# Patient Record
Sex: MALE | Race: WHITE | Employment: OTHER | URBAN - METROPOLITAN AREA
[De-identification: names, ages, dates, MRNs, and addresses within clinical notes are randomized per-mention and may not be internally consistent; named-entity substitution may affect disease eponyms.]

---

## 2020-11-21 ENCOUNTER — HOSPITAL ENCOUNTER (INPATIENT)
Age: 70
LOS: 7 days | Discharge: NURSING, REHAB OR LONG-TERM CARE | DRG: 193 | End: 2020-11-29
Attending: EMERGENCY MEDICINE | Admitting: INTERNAL MEDICINE
Payer: MEDICARE

## 2020-11-21 ENCOUNTER — APPOINTMENT (OUTPATIENT)
Dept: GENERAL RADIOLOGY | Age: 70
DRG: 193 | End: 2020-11-21
Payer: MEDICARE

## 2020-11-21 ENCOUNTER — APPOINTMENT (OUTPATIENT)
Dept: CT IMAGING | Age: 70
DRG: 193 | End: 2020-11-21
Payer: MEDICARE

## 2020-11-21 LAB
BASOPHILS ABSOLUTE: 0 K/UL (ref 0–0.2)
BASOPHILS RELATIVE PERCENT: 0.3 %
EOSINOPHILS ABSOLUTE: 0 K/UL (ref 0–0.7)
EOSINOPHILS RELATIVE PERCENT: 0.1 %
HCT VFR BLD CALC: 45.1 % (ref 42–52)
HEMOGLOBIN: 15.1 G/DL (ref 14–18)
LYMPHOCYTES ABSOLUTE: 0.5 K/UL (ref 1–4.8)
LYMPHOCYTES RELATIVE PERCENT: 3.4 %
MCH RBC QN AUTO: 30.8 PG (ref 27–31.3)
MCHC RBC AUTO-ENTMCNC: 33.4 % (ref 33–37)
MCV RBC AUTO: 92.1 FL (ref 80–100)
MONOCYTES ABSOLUTE: 1 K/UL (ref 0.2–0.8)
MONOCYTES RELATIVE PERCENT: 7.5 %
NEUTROPHILS ABSOLUTE: 11.8 K/UL (ref 1.4–6.5)
NEUTROPHILS RELATIVE PERCENT: 88.7 %
PDW BLD-RTO: 13.7 % (ref 11.5–14.5)
PLATELET # BLD: 215 K/UL (ref 130–400)
RBC # BLD: 4.9 M/UL (ref 4.7–6.1)
WBC # BLD: 13.3 K/UL (ref 4.8–10.8)

## 2020-11-21 PROCEDURE — 96376 TX/PRO/DX INJ SAME DRUG ADON: CPT

## 2020-11-21 PROCEDURE — 81001 URINALYSIS AUTO W/SCOPE: CPT

## 2020-11-21 PROCEDURE — 71046 X-RAY EXAM CHEST 2 VIEWS: CPT

## 2020-11-21 PROCEDURE — 84145 PROCALCITONIN (PCT): CPT

## 2020-11-21 PROCEDURE — 96374 THER/PROPH/DIAG INJ IV PUSH: CPT

## 2020-11-21 PROCEDURE — 36415 COLL VENOUS BLD VENIPUNCTURE: CPT

## 2020-11-21 PROCEDURE — 82550 ASSAY OF CK (CPK): CPT

## 2020-11-21 PROCEDURE — 84484 ASSAY OF TROPONIN QUANT: CPT

## 2020-11-21 PROCEDURE — 80048 BASIC METABOLIC PNL TOTAL CA: CPT

## 2020-11-21 PROCEDURE — 93005 ELECTROCARDIOGRAM TRACING: CPT | Performed by: EMERGENCY MEDICINE

## 2020-11-21 PROCEDURE — 85025 COMPLETE CBC W/AUTO DIFF WBC: CPT

## 2020-11-21 PROCEDURE — 99285 EMERGENCY DEPT VISIT HI MDM: CPT

## 2020-11-21 PROCEDURE — 70450 CT HEAD/BRAIN W/O DYE: CPT

## 2020-11-21 RX ORDER — 0.9 % SODIUM CHLORIDE 0.9 %
1000 INTRAVENOUS SOLUTION INTRAVENOUS ONCE
Status: COMPLETED | OUTPATIENT
Start: 2020-11-21 | End: 2020-11-22

## 2020-11-21 RX ORDER — SODIUM CHLORIDE 0.9 % (FLUSH) 0.9 %
3 SYRINGE (ML) INJECTION EVERY 8 HOURS
Status: DISCONTINUED | OUTPATIENT
Start: 2020-11-21 | End: 2020-11-22

## 2020-11-21 ASSESSMENT — ENCOUNTER SYMPTOMS
SHORTNESS OF BREATH: 0
BACK PAIN: 0
EYE DISCHARGE: 0
NAUSEA: 0
VOMITING: 0
SORE THROAT: 0
EYE REDNESS: 0
COUGH: 0
ABDOMINAL PAIN: 0

## 2020-11-22 ENCOUNTER — APPOINTMENT (OUTPATIENT)
Dept: ULTRASOUND IMAGING | Age: 70
DRG: 193 | End: 2020-11-22
Payer: MEDICARE

## 2020-11-22 PROBLEM — J18.9 PNEUMONIA: Status: ACTIVE | Noted: 2020-11-22

## 2020-11-22 PROBLEM — F03.90 DEMENTIA (HCC): Status: ACTIVE | Noted: 2020-11-22

## 2020-11-22 PROBLEM — R55 SYNCOPE AND COLLAPSE: Status: ACTIVE | Noted: 2020-11-22

## 2020-11-22 PROBLEM — J18.9 COMMUNITY ACQUIRED PNEUMONIA OF BOTH LOWER LOBES: Status: ACTIVE | Noted: 2020-11-22

## 2020-11-22 LAB
ALBUMIN SERPL-MCNC: 3.5 G/DL (ref 3.5–4.6)
ALP BLD-CCNC: 50 U/L (ref 35–104)
ALT SERPL-CCNC: 14 U/L (ref 0–41)
ANION GAP SERPL CALCULATED.3IONS-SCNC: 10 MEQ/L (ref 9–15)
ANION GAP SERPL CALCULATED.3IONS-SCNC: 10 MEQ/L (ref 9–15)
AST SERPL-CCNC: 11 U/L (ref 0–40)
BACTERIA: NEGATIVE /HPF
BASOPHILS ABSOLUTE: 0.1 K/UL (ref 0–0.2)
BASOPHILS RELATIVE PERCENT: 0.5 %
BILIRUB SERPL-MCNC: 0.9 MG/DL (ref 0.2–0.7)
BILIRUBIN URINE: NEGATIVE
BLOOD, URINE: NEGATIVE
BUN BLDV-MCNC: 12 MG/DL (ref 8–23)
BUN BLDV-MCNC: 9 MG/DL (ref 8–23)
CALCIUM SERPL-MCNC: 8.7 MG/DL (ref 8.5–9.9)
CALCIUM SERPL-MCNC: 9.7 MG/DL (ref 8.5–9.9)
CHLORIDE BLD-SCNC: 100 MEQ/L (ref 95–107)
CHLORIDE BLD-SCNC: 106 MEQ/L (ref 95–107)
CLARITY: CLEAR
CO2: 21 MEQ/L (ref 20–31)
CO2: 24 MEQ/L (ref 20–31)
COLOR: YELLOW
CREAT SERPL-MCNC: 0.8 MG/DL (ref 0.7–1.2)
CREAT SERPL-MCNC: 0.98 MG/DL (ref 0.7–1.2)
EOSINOPHILS ABSOLUTE: 0 K/UL (ref 0–0.7)
EOSINOPHILS RELATIVE PERCENT: 0 %
EPITHELIAL CELLS, UA: ABNORMAL /HPF (ref 0–5)
FOLATE: 14.4 NG/ML (ref 7.3–26.1)
GFR AFRICAN AMERICAN: >60
GFR AFRICAN AMERICAN: >60
GFR NON-AFRICAN AMERICAN: >60
GFR NON-AFRICAN AMERICAN: >60
GLOBULIN: 2.9 G/DL (ref 2.3–3.5)
GLUCOSE BLD-MCNC: 207 MG/DL (ref 60–115)
GLUCOSE BLD-MCNC: 252 MG/DL (ref 60–115)
GLUCOSE BLD-MCNC: 268 MG/DL (ref 60–115)
GLUCOSE BLD-MCNC: 272 MG/DL (ref 70–99)
GLUCOSE BLD-MCNC: 328 MG/DL (ref 60–115)
GLUCOSE BLD-MCNC: 407 MG/DL (ref 70–99)
GLUCOSE URINE: >=1000 MG/DL
HCT VFR BLD CALC: 43.3 % (ref 42–52)
HEMOGLOBIN: 14.6 G/DL (ref 14–18)
HYALINE CASTS: ABNORMAL /HPF (ref 0–5)
KETONES, URINE: NEGATIVE MG/DL
LACTIC ACID: 1.2 MMOL/L (ref 0.5–2.2)
LACTIC ACID: 1.5 MMOL/L (ref 0.5–2.2)
LACTIC ACID: 3.2 MMOL/L (ref 0.5–2.2)
LEUKOCYTE ESTERASE, URINE: NEGATIVE
LYMPHOCYTES ABSOLUTE: 1 K/UL (ref 1–4.8)
LYMPHOCYTES RELATIVE PERCENT: 7.9 %
MCH RBC QN AUTO: 31.3 PG (ref 27–31.3)
MCHC RBC AUTO-ENTMCNC: 33.7 % (ref 33–37)
MCV RBC AUTO: 92.9 FL (ref 80–100)
MONOCYTES ABSOLUTE: 1.4 K/UL (ref 0.2–0.8)
MONOCYTES RELATIVE PERCENT: 11.3 %
NEUTROPHILS ABSOLUTE: 10.1 K/UL (ref 1.4–6.5)
NEUTROPHILS RELATIVE PERCENT: 80.3 %
NITRITE, URINE: NEGATIVE
PDW BLD-RTO: 13.6 % (ref 11.5–14.5)
PERFORMED ON: ABNORMAL
PH UA: 5 (ref 5–9)
PLATELET # BLD: 206 K/UL (ref 130–400)
POTASSIUM REFLEX MAGNESIUM: 4 MEQ/L (ref 3.4–4.9)
POTASSIUM SERPL-SCNC: 4.5 MEQ/L (ref 3.4–4.9)
PROCALCITONIN: 0.21 NG/ML (ref 0–0.15)
PROTEIN UA: 100 MG/DL
RBC # BLD: 4.66 M/UL (ref 4.7–6.1)
RBC UA: ABNORMAL /HPF (ref 0–5)
SARS-COV-2, NAAT: NOT DETECTED
SODIUM BLD-SCNC: 134 MEQ/L (ref 135–144)
SODIUM BLD-SCNC: 137 MEQ/L (ref 135–144)
SPECIFIC GRAVITY UA: 1.02 (ref 1–1.03)
TOTAL CK: 90 U/L (ref 0–190)
TOTAL CK: 92 U/L (ref 0–190)
TOTAL PROTEIN: 6.4 G/DL (ref 6.3–8)
TROPONIN: <0.01 NG/ML (ref 0–0.01)
TSH SERPL DL<=0.05 MIU/L-ACNC: 1.06 UIU/ML (ref 0.44–3.86)
UROBILINOGEN, URINE: 0.2 E.U./DL
VITAMIN B-12: 354 PG/ML (ref 232–1245)
WBC # BLD: 12.5 K/UL (ref 4.8–10.8)
WBC UA: ABNORMAL /HPF (ref 0–5)

## 2020-11-22 PROCEDURE — 82746 ASSAY OF FOLIC ACID SERUM: CPT

## 2020-11-22 PROCEDURE — 2580000003 HC RX 258: Performed by: EMERGENCY MEDICINE

## 2020-11-22 PROCEDURE — 94664 DEMO&/EVAL PT USE INHALER: CPT

## 2020-11-22 PROCEDURE — 84443 ASSAY THYROID STIM HORMONE: CPT

## 2020-11-22 PROCEDURE — 6360000002 HC RX W HCPCS: Performed by: NURSE PRACTITIONER

## 2020-11-22 PROCEDURE — 87040 BLOOD CULTURE FOR BACTERIA: CPT

## 2020-11-22 PROCEDURE — 6370000000 HC RX 637 (ALT 250 FOR IP): Performed by: EMERGENCY MEDICINE

## 2020-11-22 PROCEDURE — 82607 VITAMIN B-12: CPT

## 2020-11-22 PROCEDURE — U0002 COVID-19 LAB TEST NON-CDC: HCPCS

## 2020-11-22 PROCEDURE — 83605 ASSAY OF LACTIC ACID: CPT

## 2020-11-22 PROCEDURE — 1210000000 HC MED SURG R&B

## 2020-11-22 PROCEDURE — 6360000002 HC RX W HCPCS: Performed by: EMERGENCY MEDICINE

## 2020-11-22 PROCEDURE — 36415 COLL VENOUS BLD VENIPUNCTURE: CPT

## 2020-11-22 PROCEDURE — 93880 EXTRACRANIAL BILAT STUDY: CPT

## 2020-11-22 PROCEDURE — 80053 COMPREHEN METABOLIC PANEL: CPT

## 2020-11-22 PROCEDURE — 82550 ASSAY OF CK (CPK): CPT

## 2020-11-22 PROCEDURE — 2580000003 HC RX 258: Performed by: NURSE PRACTITIONER

## 2020-11-22 PROCEDURE — 85025 COMPLETE CBC W/AUTO DIFF WBC: CPT

## 2020-11-22 PROCEDURE — 6370000000 HC RX 637 (ALT 250 FOR IP): Performed by: INTERNAL MEDICINE

## 2020-11-22 PROCEDURE — 99222 1ST HOSP IP/OBS MODERATE 55: CPT | Performed by: PSYCHIATRY & NEUROLOGY

## 2020-11-22 RX ORDER — SODIUM CHLORIDE 9 MG/ML
INJECTION, SOLUTION INTRAVENOUS CONTINUOUS
Status: DISPENSED | OUTPATIENT
Start: 2020-11-22 | End: 2020-11-22

## 2020-11-22 RX ORDER — LORAZEPAM 2 MG/ML
1 INJECTION INTRAMUSCULAR
Status: ACTIVE | OUTPATIENT
Start: 2020-11-22 | End: 2020-11-22

## 2020-11-22 RX ORDER — PROMETHAZINE HYDROCHLORIDE 12.5 MG/1
12.5 TABLET ORAL EVERY 6 HOURS PRN
Status: DISCONTINUED | OUTPATIENT
Start: 2020-11-22 | End: 2020-11-29 | Stop reason: HOSPADM

## 2020-11-22 RX ORDER — ONDANSETRON 2 MG/ML
4 INJECTION INTRAMUSCULAR; INTRAVENOUS EVERY 6 HOURS PRN
Status: DISCONTINUED | OUTPATIENT
Start: 2020-11-22 | End: 2020-11-29 | Stop reason: HOSPADM

## 2020-11-22 RX ORDER — SODIUM CHLORIDE 0.9 % (FLUSH) 0.9 %
10 SYRINGE (ML) INJECTION PRN
Status: DISCONTINUED | OUTPATIENT
Start: 2020-11-22 | End: 2020-11-29 | Stop reason: HOSPADM

## 2020-11-22 RX ORDER — POLYETHYLENE GLYCOL 3350 17 G/17G
17 POWDER, FOR SOLUTION ORAL DAILY PRN
Status: DISCONTINUED | OUTPATIENT
Start: 2020-11-22 | End: 2020-11-29 | Stop reason: HOSPADM

## 2020-11-22 RX ORDER — ACETAMINOPHEN 325 MG/1
650 TABLET ORAL EVERY 6 HOURS PRN
Status: DISCONTINUED | OUTPATIENT
Start: 2020-11-22 | End: 2020-11-29 | Stop reason: HOSPADM

## 2020-11-22 RX ORDER — IPRATROPIUM BROMIDE AND ALBUTEROL SULFATE 2.5; .5 MG/3ML; MG/3ML
1 SOLUTION RESPIRATORY (INHALATION) EVERY 4 HOURS PRN
Status: DISCONTINUED | OUTPATIENT
Start: 2020-11-22 | End: 2020-11-29 | Stop reason: HOSPADM

## 2020-11-22 RX ORDER — ACETAMINOPHEN 650 MG/1
650 SUPPOSITORY RECTAL EVERY 6 HOURS PRN
Status: DISCONTINUED | OUTPATIENT
Start: 2020-11-22 | End: 2020-11-29 | Stop reason: HOSPADM

## 2020-11-22 RX ORDER — SODIUM CHLORIDE 0.9 % (FLUSH) 0.9 %
10 SYRINGE (ML) INJECTION EVERY 12 HOURS SCHEDULED
Status: DISCONTINUED | OUTPATIENT
Start: 2020-11-22 | End: 2020-11-29 | Stop reason: HOSPADM

## 2020-11-22 RX ADMIN — INSULIN HUMAN 2 UNITS: 100 INJECTION, SOLUTION PARENTERAL at 02:35

## 2020-11-22 RX ADMIN — CEFTRIAXONE SODIUM 1 G: 1 INJECTION, POWDER, FOR SOLUTION INTRAMUSCULAR; INTRAVENOUS at 04:03

## 2020-11-22 RX ADMIN — INSULIN HUMAN 2 UNITS: 100 INJECTION, SOLUTION PARENTERAL at 00:44

## 2020-11-22 RX ADMIN — SODIUM CHLORIDE: 9 INJECTION, SOLUTION INTRAVENOUS at 06:15

## 2020-11-22 RX ADMIN — SODIUM CHLORIDE 1000 ML: 9 INJECTION, SOLUTION INTRAVENOUS at 00:42

## 2020-11-22 RX ADMIN — ENOXAPARIN SODIUM 40 MG: 40 INJECTION SUBCUTANEOUS at 09:15

## 2020-11-22 RX ADMIN — Medication 10 ML: at 22:58

## 2020-11-22 RX ADMIN — Medication 3 ML: at 00:42

## 2020-11-22 RX ADMIN — Medication 10 ML: at 09:15

## 2020-11-22 RX ADMIN — AZITHROMYCIN 500 MG: 500 INJECTION, POWDER, LYOPHILIZED, FOR SOLUTION INTRAVENOUS at 06:16

## 2020-11-22 RX ADMIN — INSULIN LISPRO 2 UNITS: 100 INJECTION, SOLUTION INTRAVENOUS; SUBCUTANEOUS at 17:37

## 2020-11-22 ASSESSMENT — ENCOUNTER SYMPTOMS
TROUBLE SWALLOWING: 0
VOMITING: 0
COUGH: 1
SHORTNESS OF BREATH: 0
CHOKING: 0
COLOR CHANGE: 0
PHOTOPHOBIA: 0
BACK PAIN: 0
ABDOMINAL PAIN: 0
NAUSEA: 0

## 2020-11-22 NOTE — PROGRESS NOTES
Pt admitted after midnight with encephalopathy with syncope/fall 2/2 pneumonia. Continuing Rocephin/Azithro. Neuro consulted and wants MRI. Repeat COVID tonight at [place] [address]. Can discontinue precautions after second negative test. PT/OT. Pt continues to be very confused.     Tammy Washington, DO  Internal Medicine

## 2020-11-22 NOTE — H&P
[place] [address] MEDICINE    HISTORY AND PHYSICAL EXAM    PATIENT NAME:  Juancarlos Mclaughlin    MRN:  [id number]  SERVICE DATE:  11/22/2020   SERVICE TIME:  4:04 AM    Primary Care Physician: JERRICA Gonzalez CNP         SUBJECTIVE  CHIEF COMPLAINT: Syncope and collapse    HPI: Patient is an alert and oriented x1 patient. Pleasant and cooperative. Baseline is unknown. Patient presented to the ED after wife reported that they had gone to dinner and was walking to the house and he fell to his knees and collapsed. It is unclear whether patient had any seizure activity or was unresponsive. Patient currently denies any pain. Patient can state his name and birthdate. However patient does not know the month or the year. When asked what the patient was doing today he said earlier he went to the hospital with his wife and now is home in bed. He denies going out to dinner with his wife. According to the ER staff patient had slurred speech on arrival with a left lower extremity weakness. Left lower extremity weakness has since resolved and patient has no slurred speech. Patient's baseline is unknown. However, according to EMR patient has a history of dementia and bipolar. Patient denies any pain, nausea, vomiting, or diarrhea. Patient does state he has a cough and he has been coughing all day. Patient was incontinent in the ER twice. No seizure activity observed in the ED. According to the EMR other past medical history includes CVA, diabetes, hypertension, hyperlipidemia, and cancer of skin in remission. According to wife report given to the ER staff was that the patient does not drink or smoke.     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    Diabetes ([address]     DM (diabetes mellitus) ([address]     Hyperlipidemia     Hypertension     Stroke ([address]      PAST SURGICAL HISTORY:    Past Surgical History: Procedure Laterality Date    AV FISTULA REPAIR      COLONOSCOPY  10/21/15    w/polypectomy     HERNIA REPAIR      TONSILLECTOMY      TRACHEOTOMY       FAMILY HISTORY:    Family History   Problem Relation Age of Onset    Diabetes Mother     Heart Disease Mother     Stroke Father     Heart Disease Father     Cancer Brother     Stroke Sister      SOCIAL HISTORY:    Social History     Socioeconomic History    Marital status:      Spouse name: Not on file    Number of children: Not on file    Years of education: Not on file    Highest education level: Not on file   Occupational History    Not on file   Social Needs    Financial resource strain: Not on file    Food insecurity     Worry: Not on file     Inability: Not on file    Transportation needs     Medical: Not on file     Non-medical: Not on file   Tobacco Use    Smoking status: Former Smoker    Smokeless tobacco: Never Used   Substance and Sexual Activity    Alcohol use: No     Alcohol/week: 0.0 standard drinks    Drug use: No    Sexual activity: Not on file   Lifestyle    Physical activity     Days per week: Not on file     Minutes per session: Not on file    Stress: Not on file   Relationships    Social connections     Talks on phone: Not on file     Gets together: Not on file     Attends Advent service: Not on file     Active member of club or organization: Not on file     Attends meetings of clubs or organizations: Not on file     Relationship status: Not on file    Intimate partner violence     Fear of current or ex partner: Not on file     Emotionally abused: Not on file     Physically abused: Not on file     Forced sexual activity: Not on file   Other Topics Concern    Not on file   Social History Narrative    Not on file     MEDICATIONS:   Prior to Admission medications    Medication Sig Start Date End Date Taking?  Authorizing Provider   glipiZIDE (GLUCOTROL) 5 MG ER tablet Take by mouth 2/14/08 Historical Provider, MD   lamoTRIgine (LAMICTAL) 100 MG tablet Take by mouth 2/14/08   Historical Provider, MD   ziprasidone (GEODON) 20 MG capsule Take by mouth 2/14/08   Historical Provider, MD   metFORMIN (GLUCOPHAGE) 500 MG tablet Take 500 mg by mouth 4 times daily    Historical Provider, MD   SIMVASTATIN PO Take by mouth    Historical Provider, MD   lisinopril (PRINIVIL;ZESTRIL) 10 MG tablet Take 10 mg by mouth daily    Historical Provider, MD   aspirin 81 MG tablet Take 81 mg by mouth daily    Historical Provider, MD       ALLERGIES: Pioglitazone    REVIEW OF SYSTEM:   Review of Systems   Unable to perform ROS: Dementia (ROS limited due to dementia and mental status.)   Respiratory: Positive for cough. Cardiovascular: Negative for chest pain. Gastrointestinal: Negative for abdominal pain. Musculoskeletal: Negative for neck pain. Neurological: Negative for headaches. OBJECTIVE  PHYSICAL EXAM: /82   Pulse 97   Temp 98.6 °F (37 °C) (Oral)   Resp 21   Ht 6' (1.829 m)   Wt 212 lb (96.2 kg)   SpO2 100%   BMI 28.75 kg/m²     Physical Exam  Vitals signs and nursing note reviewed. Constitutional:       Appearance: He is well-developed. HENT:      Right Ear: External ear normal.      Left Ear: External ear normal.      Nose: Nose normal.   Eyes:      Pupils: Pupils are equal, round, and reactive to light. Neck:      Musculoskeletal: Normal range of motion. Cardiovascular:      Rate and Rhythm: Normal rate and regular rhythm. Heart sounds: Normal heart sounds. Pulmonary:      Effort: Pulmonary effort is normal. No respiratory distress. Breath sounds: Normal breath sounds. No wheezing. Abdominal:      General: Bowel sounds are normal.      Palpations: Abdomen is soft. There is no mass. Tenderness: There is no abdominal tenderness. Musculoskeletal: Normal range of motion. General: No deformity or signs of injury. Right lower leg: No edema.       Left lower leg: No edema. Lymphadenopathy:      Cervical: No cervical adenopathy. Skin:     General: Skin is warm and dry. Capillary Refill: Capillary refill takes less than 2 seconds. Neurological:      Mental Status: He is alert. Sensory: Sensation is intact. Motor: No tremor, seizure activity or pronator drift. Deep Tendon Reflexes: Reflexes normal.   Psychiatric:         Attention and Perception: Attention normal.         Speech: Speech normal. Speech is not slurred. Behavior: Behavior is cooperative. Thought Content: Thought content normal.         Cognition and Memory: Cognition is impaired. Memory is impaired. He exhibits impaired recent memory. DATA:     Diagnostic tests reviewed for today's visit:    Most recent labs and imaging results reviewed.      LABS:    Recent Results (from the past 24 hour(s))   Basic Metabolic Panel    Collection Time: 11/21/20 11:15 PM   Result Value Ref Range    Sodium 134 (L) 135 - 144 mEq/L    Potassium 4.5 3.4 - 4.9 mEq/L    Chloride 100 95 - 107 mEq/L    CO2 24 20 - 31 mEq/L    Anion Gap 10 9 - 15 mEq/L    Glucose 407 (HH) 70 - 99 mg/dL    BUN 12 8 - 23 mg/dL    CREATININE 0.98 0.70 - 1.20 mg/dL    GFR Non-African American >60.0 >60    GFR  >60.0 >60    Calcium 9.7 8.5 - 9.9 mg/dL   CBC Auto Differential    Collection Time: 11/21/20 11:15 PM   Result Value Ref Range    WBC 13.3 (H) 4.8 - 10.8 K/uL    RBC 4.90 4.70 - 6.10 M/uL    Hemoglobin 15.1 14.0 - 18.0 g/dL    Hematocrit 45.1 42.0 - 52.0 %    MCV 92.1 80.0 - 100.0 fL    MCH 30.8 27.0 - 31.3 pg    MCHC 33.4 33.0 - 37.0 %    RDW 13.7 11.5 - 14.5 %    Platelets 659 010 - 176 K/uL    Neutrophils % 88.7 %    Lymphocytes % 3.4 %    Monocytes % 7.5 %    Eosinophils % 0.1 %    Basophils % 0.3 %    Neutrophils Absolute 11.8 (H) 1.4 - 6.5 K/uL    Lymphocytes Absolute 0.5 (L) 1.0 - 4.8 K/uL    Monocytes Absolute 1.0 (H) 0.2 - 0.8 K/uL    Eosinophils Absolute 0.0 0.0 - 0.7 K/uL    Basophils Absolute 0.0 0.0 - 0.2 K/uL   Urinalysis    Collection Time: 11/21/20 11:15 PM   Result Value Ref Range    Color, UA Yellow Straw/Yellow    Clarity, UA Clear Clear    Glucose, Ur >=1000 (A) Negative mg/dL    Bilirubin Urine Negative Negative    Ketones, Urine Negative Negative mg/dL    Specific Gravity, UA 1.024 1.005 - 1.030    Blood, Urine Negative Negative    pH, UA 5.0 5.0 - 9.0    Protein,  (A) Negative mg/dL    Urobilinogen, Urine 0.2 <2.0 E.U./dL    Nitrite, Urine Negative Negative    Leukocyte Esterase, Urine Negative Negative   Troponin    Collection Time: 11/21/20 11:15 PM   Result Value Ref Range    Troponin <0.010 0.000 - 0.010 ng/mL   CK    Collection Time: 11/21/20 11:15 PM   Result Value Ref Range    Total CK 92 0 - 190 U/L   Microscopic Urinalysis    Collection Time: 11/21/20 11:15 PM   Result Value Ref Range    Bacteria, UA Negative Negative /HPF    Hyaline Casts, UA 0-1 0 - 5 /HPF    WBC, UA 0-2 0 - 5 /HPF    RBC, UA 3-5 (A) 0 - 5 /HPF    Epithelial Cells, UA 0-2 0 - 5 /HPF   EKG 12 Lead - Chest Pain    Collection Time: 11/21/20 11:29 PM   Result Value Ref Range    Ventricular Rate 93 BPM    Atrial Rate 93 BPM    P-R Interval 138 ms    QRS Duration 98 ms    Q-T Interval 346 ms    QTc Calculation (Bazett) 430 ms    P Axis 36 degrees    R Axis 14 degrees    T Axis 58 degrees   Lactic Acid, Plasma    Collection Time: 11/22/20  1:00 AM   Result Value Ref Range    Lactic Acid 3.2 (HH) 0.5 - 2.2 mmol/L   POCT Glucose    Collection Time: 11/22/20  1:34 AM   Result Value Ref Range    POC Glucose 328 (H) 60 - 115 mg/dl    Performed on ACCU-CHEK    COVID-19    Collection Time: 11/22/20  2:54 AM   Result Value Ref Range    SARS-CoV-2, NAAT Not Detected Not Detected   POCT Glucose    Collection Time: 11/22/20  3:44 AM   Result Value Ref Range    POC Glucose 252 (H) 60 - 115 mg/dl    Performed on ACCU-CHEK        IMAGING:  No results found.     VTE Prophylaxis: low molecular weight heparin -  start    ASSESSMENT AND PLAN    Principal Problem:  1) Pneumonia: Chest x-ray: left lower lobe pneumonia. Lactic acid 3.2. Patient received 1L normal saline in ED. Patient received Zithromax IV and Rocephin IV in ED. We will send for procalcitonin. We will continue Zithromax and Rocephin. We will monitor respiratory status closely. We will continue gentle IVF. We will recheck lactic acid. 2) Syncope and collapse: While with wife today patient collapsed to his knees. Unclear if he lost responsiveness or if he had seizure activity. Lactic acid was elevated 3.2. Patient appears to be confused at this time. Baseline unknown. Patient has a history of dementia. Head CT negative. We will consult neurology to evaluate possible cause of syncope as seizure, dementia related, or infectious in nature. Active Problems:  3) Bipolar 1 disorder: Patient on home meds to control. Patient on Lamictal.  No known records of seizure in the past.    4) Diabetes: Patient on insulin at home. Glucose 407 in ED. In ED brought to 250 with insulin. We will monitor glucose and cover with medium sliding scale insulin. 5) Dementia: EMR shows history of dementia. Patient on Alzheimer meds. We will continue home meds. We will get PT OT. We will get case management for home health needs. Plan of care discussed with: patient    SIGNATURE: Yumiko Rodriguez RN, NP  DATE: November 22, 2020  TIME: 4:04 AM     LINO Landry MD - supervising physician

## 2020-11-22 NOTE — ED TRIAGE NOTES
Per squad patient was out with his wife for dinner and when returning home patient got out of the car walking up to the house and became weak went down on his knees and then sat down and could not get up. Patient did urinate on him self during this process.

## 2020-11-22 NOTE — CARE COORDINATION
PHONED [facility]. THEY HAVE NO BEDS AVAILABLE. FAXED INFORMATION TO SECURE LINE AT 21 [phone]. SHE STATES TO FOLLOW UP WITH THE TRANSFER LINE IN A FEW DAYS.

## 2020-11-22 NOTE — CARE COORDINATION
Phoned wife Marsha Larson on cell phone. HIPAA code provided. She states they were doing [place] at the [employer] last night and he has been confused. She states he does not drink etoh. She states they have no family close by, all live out of state. She does not know the name or phone number of his daughter Benita Szymanski. She states he is usually independent. SHE STATES HE IS A [facility] PATIENT. NEED TO NOTIFY [facility]. [facility] EMERGENCY [facility] AT [place] Case Management Initial Discharge Assessment    Met with Patient to discuss discharge plan. PCP: Xochitl Mchugh APRN - CNP                                Date of Last Visit: \"I can't remember\"    If no PCP, list provided? N/A    Discharge Planning    Living Arrangements: independently at home    Who do you live with? WIFE    Who helps you with your care:  self    If lives at home:     Do you have any barriers navigating in your home? no    Patient can perform ADL? Yes    Current Services (outpatient and in home) :  None    Dialysis: No    Is transportation available to get to your appointments? No, Discussed Options PT IS THE ONLY PERSON WHO DRIVES, WILL NEED ASSISTANCE. DME Equipment:  no    Respiratory equipment: None    Respiratory provider:  no     [facility]  [facility]     Consult with Medication Assistance Program?  No      Patient agreeable to [facility]? Yes, [facility]    Patient agreeable to SNF/Rehab? Declined    Other discharge needs identified? N/A    Freedom of choice list provided with basic dialogue that supports the patient's individualized plan of care/goals and shares the quality data associated with the providers. Yes    Does Patient Have a High-Risk for Readmission Diagnosis (CHF, PN, MI, COPD)? YES, HX OF DM, STROKE    The plan for Transition of Care is related to the following treatment goals:INCREASED STRENGTH, [address] [place] [address]    Initial Discharge Plan?  (Note: please see concurrent daily documentation for any updates after initial note).     HOME W/WIFE    The Patient and/or patient representative: PATIENT was provided with choice of any post-acute providers for care and equipment and agrees with discharge plan  Yes    Electronically signed by Freddie Black RN on 11/22/2020 at 11:15 AM

## 2020-11-22 NOTE — PLAN OF CARE
Problem: Falls - Risk of:  Goal: Will remain free from falls  Description: Will remain free from falls  11/22/2020 1207 by Eamon Dodge RN  Outcome: Ongoing  11/22/2020 0625 by Neena Garcia RN  Outcome: Ongoing  Goal: Absence of physical injury  Description: Absence of physical injury  11/22/2020 1207 by Eamon Dodge RN  Outcome: Ongoing  11/22/2020 0625 by Neena Garcia RN  Outcome: Ongoing     Problem: Skin Integrity:  Goal: Will show no infection signs and symptoms  Description: Will show no infection signs and symptoms  11/22/2020 1207 by Eamon Dodge RN  Outcome: Ongoing  11/22/2020 0625 by Neena Garcia RN  Outcome: Ongoing  Goal: Absence of new skin breakdown  Description: Absence of new skin breakdown  11/22/2020 1207 by Eamon Dodge RN  Outcome: Ongoing  11/22/2020 0625 by Neena Garcia RN  Outcome: Ongoing     Problem: HEMODYNAMIC STATUS  Goal: Patient has stable vital signs and fluid balance  Outcome: Ongoing     Problem: ACTIVITY INTOLERANCE/IMPAIRED MOBILITY  Goal: Mobility/activity is maintained at optimum level for patient  Outcome: Ongoing     Problem: COMMUNICATION IMPAIRMENT  Goal: Ability to express needs and understand communication  Outcome: Ongoing

## 2020-11-22 NOTE — ED NOTES
Bed: 02  Expected date: 11/21/20  Expected time: 10:43 PM  Means of arrival: [facility]  Comments:  77 M  nissa Lechuga RN  11/21/20 7627

## 2020-11-22 NOTE — PROGRESS NOTES
[facility]      Date: 2020  Patient Name: Maryjane Davis        MRN: [id number]  Account: [de-identified]   : 1950  (65 y.o.)  Room: X250/J741-78    Pt. is of observation status. To comply with medicare and insurance regulations, to see patient we require updated orders including a valid OT treatment diagnosis. Please reorder as appropriate.      Electronically signed by Jin Garvey OT on 2020 at 8:53 AM

## 2020-11-22 NOTE — CONSULTS
Hyperlipidemia     Hypertension     Stroke [facility])      Past Surgical History:   Procedure Laterality Date    AV FISTULA REPAIR      COLONOSCOPY  10/21/15    w/polypectomy     HERNIA REPAIR      TONSILLECTOMY      TRACHEOTOMY       Social History     Socioeconomic History    Marital status:      Spouse name: Not on file    Number of children: Not on file    Years of education: Not on file    Highest education level: Not on file   Occupational History    Not on file   Social Needs    Financial resource strain: Not on file    Food insecurity     Worry: Not on file     Inability: Not on file    Transportation needs     Medical: Not on file     Non-medical: Not on file   Tobacco Use    Smoking status: Former Smoker    Smokeless tobacco: Never Used   Substance and Sexual Activity    Alcohol use: No     Alcohol/week: 0.0 standard drinks    Drug use: No    Sexual activity: Not on file   Lifestyle    Physical activity     Days per week: Not on file     Minutes per session: Not on file    Stress: Not on file   Relationships    Social connections     Talks on phone: Not on file     Gets together: Not on file     Attends Mosque service: Not on file     Active member of club or organization: Not on file     Attends meetings of clubs or organizations: Not on file     Relationship status: Not on file    Intimate partner violence     Fear of current or ex partner: Not on file     Emotionally abused: Not on file     Physically abused: Not on file     Forced sexual activity: Not on file   Other Topics Concern    Not on file   Social History Narrative    Not on file     Family History   Problem Relation Age of Onset    Diabetes Mother     Heart Disease Mother     Stroke Father     Heart Disease Father     Cancer Brother     Stroke Sister      Allergies   Allergen Reactions    Pioglitazone      Current Facility-Administered Medications   Medication Dose Route Frequency Provider Last Rate Last Dose    sodium chloride flush 0.9 % injection 10 mL  10 mL Intravenous 2 times per day Rose Nicole RN, NP   10 mL at 11/22/20 0915    sodium chloride flush 0.9 % injection 10 mL  10 mL Intravenous PRN Rose Nicole RN, JENNIFER        acetaminophen (TYLENOL) tablet 650 mg  650 mg Oral Q6H PRN Rose Nicole RN, NP        Or    acetaminophen (TYLENOL) suppository 650 mg  650 mg Rectal Q6H PRN Rose Nicole RN, JENNIFER        polyethylene glycol (GLYCOLAX) packet 17 g  17 g Oral Daily PRN Rose Nicole RN, NP        promethazine (PHENERGAN) tablet 12.5 mg  12.5 mg Oral Q6H PRN Rose Nicole RN, NP        Or    ondansetron (ZOFRAN) injection 4 mg  4 mg Intravenous Q6H PRN Rose Nicole RN, JENNIFER        enoxaparin (LOVENOX) injection 40 mg  40 mg Subcutaneous Daily Rose Nicole RN, NP   40 mg at 11/22/20 0915    0.9 % sodium chloride infusion   Intravenous Continuous Rose Nicole RN,  mL/hr at 11/22/20 0615      ipratropium-albuterol (DUONEB) nebulizer solution 1 ampule  1 ampule Inhalation Q4H PRN Rose Nicole RN, NP        [START ON 11/23/2020] azithromycin (ZITHROMAX) 500 mg in D5W 250ml addavial  500 mg Intravenous Q24H Rose Nicole RN, NP        And    [START ON 11/23/2020] cefTRIAXone (ROCEPHIN) 1 g IVPB in 50 mL D5W minibag  1 g Intravenous Q24H Rose Nicole RN, JENNIFER            Objective:   BP (!) 140/70   Pulse 100   Temp 98.6 °F (37 °C) (Oral)   Resp 18   Ht 6' (1.829 m)   Wt 212 lb (96.2 kg)   SpO2 97%   BMI 28.75 kg/m²     Physical Exam  Vitals signs reviewed. Eyes:      Pupils: Pupils are equal, round, and reactive to light. Neck:      Musculoskeletal: Normal range of motion. Cardiovascular:      Rate and Rhythm: Normal rate and regular rhythm. Heart sounds: No murmur.    Pulmonary:      Effort: Pulmonary effort is normal.      Breath sounds: Normal breath sounds. Abdominal:      General: Bowel sounds are normal.   Musculoskeletal: Normal range of motion. Skin:     General: Skin is warm. Neurological:      Mental Status: He is alert and oriented to person, place, and time. Cranial Nerves: No cranial nerve deficit. Sensory: No sensory deficit. Motor: No abnormal muscle tone. Coordination: Coordination normal.      Deep Tendon Reflexes: Reflexes are normal and symmetric. Babinski sign absent on the right side. Babinski sign absent on the left side. Psychiatric:         Mood and Affect: Mood normal.       Absent ankle reflex is notable, I await physical therapy for further evaluation  Xr Chest (2 Vw)    Result Date: 11/22/2020  XR CHEST (2 VW) : 11/21/2020 CLINICAL HISTORY:  mental status change . COMPARISON: Portable chest 8/10/2012 and two-view chest 7/19/2005. TECHNIQUE: Upright AP and lateral radiographs of the chest were obtained. FINDINGS: A poor inspiratory volume is present with mild probable bibasilar atelectasis. Bronchopneumonia should be excluded clinically. There is no cardiomegaly, significant pleural effusion, vascular congestion, pneumothorax, or displaced fractures identified. POOR INSPIRATION WITH MILD PROBABLE BIBASILAR ATELECTASIS. BRONCHOPNEUMONIA SHOULD BE EXCLUDED CLINICALLY. Ct Head Wo Contrast    Result Date: 11/22/2020  CT Brain Contrast medium:  Not utilized. History: Altered mental status Comparison:  CT brain, August 10, 2012 MRI brain, August 12, 2012 Findings: Extra-axial spaces:  Normal. Intracranial hemorrhage:  None. Ventricular system: Ventricles mildly enlarged. Sulci mildly prominent. Basal Cisterns:  Normal. Cerebral Parenchyma: Bilateral symmetric periventricular areas decreased attenuation. Midline Shift:  None. Cerebellum:  Normal. Paranasal sinuses and mastoid air cells:  Normal. Visualized Orbits:  Normal.     Impression: No acute findings. Mild cerebral atrophy.  Chronic ischemic white matter disease. All CT scans at this facility use dose modulation, iterative reconstruction, and/or weight based dosing when appropriate to reduce radiation dose to as low as reasonably achievable. Lab Results   Component Value Date    WBC 12.5 11/22/2020    RBC 4.66 11/22/2020    HGB 14.6 11/22/2020    HCT 43.3 11/22/2020    MCV 92.9 11/22/2020    MCH 31.3 11/22/2020    MCHC 33.7 11/22/2020    RDW 13.6 11/22/2020     11/22/2020    MPV 7.3 08/12/2012     Lab Results   Component Value Date     11/22/2020    K 4.0 11/22/2020     11/22/2020    CO2 21 11/22/2020    BUN 9 11/22/2020    CREATININE 0.80 11/22/2020    GFRAA >60.0 11/22/2020    LABGLOM >60.0 11/22/2020    GLUCOSE 272 11/22/2020    PROT 6.4 11/22/2020    LABALBU 3.5 11/22/2020    CALCIUM 8.7 11/22/2020    BILITOT 0.9 11/22/2020    ALKPHOS 50 11/22/2020    AST 11 11/22/2020    ALT 14 11/22/2020     Lab Results   Component Value Date    PROTIME 11.2 08/10/2012    INR 1.1 08/10/2012     Lab Results   Component Value Date    TSH 0.784 08/12/2012    [id number] 286 08/12/2012    FOLATE 16.6 08/12/2012     Lab Results   Component Value Date    TRIG 92 08/10/2012    HDL 41 08/10/2012    LDLCALC 90 08/10/2012     No results found for: Wandalee Snow, LABBENZ, CANNAB, COCAINESCRN, LABMETH, OPIATESCREENURINE, PHENCYCLIDINESCREENURINE, PPXUR, ETOH  No results found for: LITHIUM, DILFRTOT, VALPROATE    Assessment:   Presyncope or a basilar TIA this cannot be ruled out. Patient's leg weakness could be from lumbar canal stenosis. The incontinence is a red herring. Patient does have history of lumbar disc disease with lumbar canal stenosis seen at L3-L4. This was diagnosed in 2012 am not quite sure if anything has been done for it. I do not see a scar or any surgical intervention done and therefore patient require MRI of the lumbosacral spine. He is not quite aware of any other history.     We will assess his walking abilities once we have the MRI.    Yves VIDYA Escobar MD, [place], American Board of Psychiatry & Neurology  Board Certified in Vascular Neurology  Board Certified in Neuromuscular Medicine  Certified in [facility]:

## 2020-11-22 NOTE — PROGRESS NOTES
Shift assessment completed. Pt is alert but disoriented x4, nonredirectable at times, impulsive. Pt is very confused he keeps trying to get up to use the restroom even though he has a [place] catheter in place. Neuro assessment unremarkable. Lung sounds regular, unlabored, chest expansion symmetrical but diminished. Heart sounds WDL. Bowel sounds active x4 quadrants. Pt has no requests at this time, resting comfortably in bed, call light within reach.

## 2020-11-22 NOTE — ED PROVIDER NOTES
[address] [facility] ED  EMERGENCY DEPARTMENT ENCOUNTER      Pt Name: Yovanny Lechuga  MRN: [id number]  [place] 1950  Date of evaluation: 11/21/2020  Provider: Myles Stewart DO        HISTORY OF PRESENT ILLNESS    Yovanny Lechuga is a 65 y.o. male per chart review has ah/o CVA, Diabetes and HTN. Presents with syncope. The history is provided by the patient and the EMS personnel. Altered Mental Status   Presenting symptoms: unresponsiveness    Presenting symptoms: no confusion    Severity:  Moderate  Most recent episode: Today  Episode history:  Single  Duration:  1 minute  Timing:  Intermittent  Progression:  Unable to specify  Chronicity:  New  Associated symptoms: weakness    Associated symptoms: no abdominal pain, no fever, no nausea, no palpitations, no rash and no vomiting             REVIEW OF SYSTEMS       Review of Systems   Constitutional: Negative for chills and fever. HENT: Negative for ear pain and sore throat. Eyes: Negative for discharge and redness. Respiratory: Negative for cough and shortness of breath. Cardiovascular: Negative for chest pain and palpitations. Gastrointestinal: Negative for abdominal pain, nausea and vomiting. Genitourinary: Negative for difficulty urinating and dysuria. Musculoskeletal: Negative for back pain and neck pain. Skin: Negative for rash and wound. Neurological: Positive for syncope and weakness. Negative for dizziness. Psychiatric/Behavioral: Negative for confusion. The patient is not nervous/anxious. All other systems reviewed and are negative. Except as noted above the remainder of the review of systems was reviewed and negative.        PAST MEDICAL HISTORY     Past Medical History:   Diagnosis Date    Bipolar 1 disorder ([facility] [address]     Cancer of skin of left ear     cured    Diabetes ([facility] [address]     DM (diabetes mellitus) ([facility] [address]     Hyperlipidemia     Hypertension     Stroke [facility])          SURGICAL HISTORY       Past Surgical History:   Procedure Laterality Date    AV FISTULA REPAIR      COLONOSCOPY  10/21/15    w/polypectomy     HERNIA REPAIR      TONSILLECTOMY      TRACHEOTOMY           CURRENT MEDICATIONS       Previous Medications    ASPIRIN 81 MG TABLET    Take 81 mg by mouth daily    GLIPIZIDE (GLUCOTROL) 5 MG ER TABLET    Take by mouth    LAMOTRIGINE (LAMICTAL) 100 MG TABLET    Take by mouth    LISINOPRIL (PRINIVIL;ZESTRIL) 10 MG TABLET    Take 10 mg by mouth daily    METFORMIN (GLUCOPHAGE) 500 MG TABLET    Take 500 mg by mouth 4 times daily    SIMVASTATIN PO    Take by mouth    ZIPRASIDONE (GEODON) 20 MG CAPSULE    Take by mouth       ALLERGIES     Pioglitazone    FAMILY HISTORY       Family History   Problem Relation Age of Onset    Diabetes Mother     Heart Disease Mother     Stroke Father     Heart Disease Father     Cancer Brother     Stroke Sister           SOCIAL HISTORY       Social History     Socioeconomic History    Marital status:      Spouse name: None    Number of children: None    Years of education: None    Highest education level: None   Occupational History    None   Social Needs    Financial resource strain: None    Food insecurity     Worry: None     Inability: None    Transportation needs     Medical: None     Non-medical: None   Tobacco Use    Smoking status: Former Smoker    Smokeless tobacco: Never Used   Substance and Sexual Activity    Alcohol use: No     Alcohol/week: 0.0 standard drinks    Drug use: No    Sexual activity: None   Lifestyle    Physical activity     Days per week: None     Minutes per session: None    Stress: None   Relationships    Social connections     Talks on phone: None     Gets together: None     Attends [employer] service: None     Active member of club or organization: None     Attends meetings of clubs or organizations: None     Relationship status: None    Intimate partner violence     Fear of current or ex partner: None Emotionally abused: None     Physically abused: None     Forced sexual activity: None   Other Topics Concern    None   Social History Narrative    None         PHYSICAL EXAM       ED Triage Vitals   BP Temp Temp src Pulse Resp SpO2 Height Weight   -- -- -- -- -- -- -- --       Physical Exam  Vitals signs and nursing note reviewed. Constitutional:       Appearance: Normal appearance. Comments: Wet himself pants wet   HENT:      Head: Normocephalic and atraumatic. Right Ear: Tympanic membrane normal.      Left Ear: Tympanic membrane normal.      Nose: Nose normal.      Mouth/Throat:      Mouth: Mucous membranes are moist.      Pharynx: Oropharynx is clear. Eyes:      General: Lids are normal.      Extraocular Movements: Extraocular movements intact. Conjunctiva/sclera: Conjunctivae normal.      Pupils: Pupils are equal, round, and reactive to light. Neck:      Musculoskeletal: Full passive range of motion without pain, normal range of motion and neck supple. Cardiovascular:      Rate and Rhythm: Normal rate and regular rhythm. Pulses: Normal pulses. Heart sounds: Normal heart sounds. Pulmonary:      Effort: Pulmonary effort is normal.      Breath sounds: Normal breath sounds. Abdominal:      General: Abdomen is flat. Bowel sounds are normal.      Palpations: Abdomen is soft. Musculoskeletal: Normal range of motion. Skin:     General: Skin is warm. Capillary Refill: Capillary refill takes less than 2 seconds. Neurological:      General: No focal deficit present. Mental Status: He is alert and oriented to person, place, and time. GCS: GCS eye subscore is 4. GCS verbal subscore is 5. GCS motor subscore is 6. Cranial Nerves: Cranial nerves are intact. Sensory: Sensation is intact. Motor: Motor function is intact. Deep Tendon Reflexes: Reflexes are normal and symmetric.    Psychiatric:         Attention and Perception: Attention and perception TempSrc:       SpO2: 100%      Weight:       Height:           MDM  Number of Diagnoses or Management Options  Hyperglycemia:   TIA (transient ischemic attack):   Diagnosis management comments: Patient presents with a change in mental status. CT brain, CXR, EKG and labs ordered. The patient has a blood glucose of 400. Insulin 2 Units Regular ordered IV. CT brain is negative for acute changes. The patient has a WBC of 13. CXR shows possible infiltrate on the right side. EKG Interpretation    Interpreted by emergency department physician    Rhythm: normal sinus   Rate: normal  Axis: normal  Ectopy: none  Conduction: normal  ST Segments: nonspecific changes  T Waves: non specific changes  Q Waves: nonspecific    Clinical Impression: non-specific EKG    GABRIEL KEYES         CRITICAL CARE TIME   Total CriticalCare time was 0 minutes, excluding separately reportable procedures. There was a high probability of clinically significant/life threatening deterioration in the patient's condition which required my urgent intervention. PROCEDURES:  Unlessotherwise noted below, none     Procedures      FINAL IMPRESSION      1. Hyperglycemia    2.  TIA (transient ischemic attack)          DISPOSITION/PLAN   DISPOSITION Admitted 11/22/2020 02:47:26 AM          GABRIEL Ackerman DO (electronically signed)  Attending Emergency Physician          Shayla Bryant DO  11/25/20 2905

## 2020-11-22 NOTE — ED NOTES
Tried to call patient's wife Kaycee Phillip  At [phone] no answer.      Kiana Polo RN  11/22/20 7207

## 2020-11-23 ENCOUNTER — APPOINTMENT (OUTPATIENT)
Dept: MRI IMAGING | Age: 70
DRG: 193 | End: 2020-11-23
Payer: MEDICARE

## 2020-11-23 ENCOUNTER — APPOINTMENT (OUTPATIENT)
Dept: CT IMAGING | Age: 70
DRG: 193 | End: 2020-11-23
Payer: MEDICARE

## 2020-11-23 ENCOUNTER — APPOINTMENT (OUTPATIENT)
Dept: ULTRASOUND IMAGING | Age: 70
DRG: 193 | End: 2020-11-23
Payer: MEDICARE

## 2020-11-23 LAB
ALBUMIN SERPL-MCNC: 3.3 G/DL (ref 3.5–4.6)
ALP BLD-CCNC: 47 U/L (ref 35–104)
ALT SERPL-CCNC: 12 U/L (ref 0–41)
ANION GAP SERPL CALCULATED.3IONS-SCNC: 8 MEQ/L (ref 9–15)
AST SERPL-CCNC: 12 U/L (ref 0–40)
BASOPHILS ABSOLUTE: 0.1 K/UL (ref 0–0.2)
BASOPHILS RELATIVE PERCENT: 0.6 %
BILIRUB SERPL-MCNC: 0.9 MG/DL (ref 0.2–0.7)
BUN BLDV-MCNC: 11 MG/DL (ref 8–23)
CALCIUM SERPL-MCNC: 9.1 MG/DL (ref 8.5–9.9)
CHLORIDE BLD-SCNC: 103 MEQ/L (ref 95–107)
CHOLESTEROL, TOTAL: 103 MG/DL (ref 0–199)
CO2: 24 MEQ/L (ref 20–31)
CREAT SERPL-MCNC: 0.91 MG/DL (ref 0.7–1.2)
EOSINOPHILS ABSOLUTE: 0 K/UL (ref 0–0.7)
EOSINOPHILS RELATIVE PERCENT: 0.5 %
GFR AFRICAN AMERICAN: >60
GFR NON-AFRICAN AMERICAN: >60
GLOBULIN: 3.1 G/DL (ref 2.3–3.5)
GLUCOSE BLD-MCNC: 210 MG/DL (ref 70–99)
GLUCOSE BLD-MCNC: 229 MG/DL (ref 60–115)
GLUCOSE BLD-MCNC: 289 MG/DL (ref 60–115)
GLUCOSE BLD-MCNC: 308 MG/DL (ref 60–115)
GLUCOSE BLD-MCNC: 321 MG/DL (ref 60–115)
HBA1C MFR BLD: 9.4 % (ref 4.8–5.9)
HCT VFR BLD CALC: 42.1 % (ref 42–52)
HDLC SERPL-MCNC: 31 MG/DL (ref 40–59)
HEMOGLOBIN: 14.3 G/DL (ref 14–18)
LACTIC ACID: 0.9 MMOL/L (ref 0.5–2.2)
LACTIC ACID: 1.2 MMOL/L (ref 0.5–2.2)
LDL CHOLESTEROL CALCULATED: 42 MG/DL (ref 0–129)
LYMPHOCYTES ABSOLUTE: 1.3 K/UL (ref 1–4.8)
LYMPHOCYTES RELATIVE PERCENT: 13.7 %
MCH RBC QN AUTO: 31.6 PG (ref 27–31.3)
MCHC RBC AUTO-ENTMCNC: 34 % (ref 33–37)
MCV RBC AUTO: 92.7 FL (ref 80–100)
MONOCYTES ABSOLUTE: 0.9 K/UL (ref 0.2–0.8)
MONOCYTES RELATIVE PERCENT: 8.9 %
NEUTROPHILS ABSOLUTE: 7.5 K/UL (ref 1.4–6.5)
NEUTROPHILS RELATIVE PERCENT: 76.3 %
PDW BLD-RTO: 13.6 % (ref 11.5–14.5)
PERFORMED ON: ABNORMAL
PLATELET # BLD: 221 K/UL (ref 130–400)
POTASSIUM REFLEX MAGNESIUM: 4 MEQ/L (ref 3.4–4.9)
RBC # BLD: 4.54 M/UL (ref 4.7–6.1)
SARS-COV-2, NAAT: NOT DETECTED
SODIUM BLD-SCNC: 135 MEQ/L (ref 135–144)
TOTAL PROTEIN: 6.4 G/DL (ref 6.3–8)
TRIGL SERPL-MCNC: 152 MG/DL (ref 0–150)
WBC # BLD: 9.8 K/UL (ref 4.8–10.8)

## 2020-11-23 PROCEDURE — 83605 ASSAY OF LACTIC ACID: CPT

## 2020-11-23 PROCEDURE — 80053 COMPREHEN METABOLIC PANEL: CPT

## 2020-11-23 PROCEDURE — 99233 SBSQ HOSP IP/OBS HIGH 50: CPT | Performed by: PSYCHIATRY & NEUROLOGY

## 2020-11-23 PROCEDURE — 76775 US EXAM ABDO BACK WALL LIM: CPT

## 2020-11-23 PROCEDURE — 80061 LIPID PANEL: CPT

## 2020-11-23 PROCEDURE — 83036 HEMOGLOBIN GLYCOSYLATED A1C: CPT

## 2020-11-23 PROCEDURE — 6370000000 HC RX 637 (ALT 250 FOR IP): Performed by: INTERNAL MEDICINE

## 2020-11-23 PROCEDURE — 51702 INSERT TEMP BLADDER CATH: CPT

## 2020-11-23 PROCEDURE — 1210000000 HC MED SURG R&B

## 2020-11-23 PROCEDURE — U0002 COVID-19 LAB TEST NON-CDC: HCPCS

## 2020-11-23 PROCEDURE — 6360000002 HC RX W HCPCS: Performed by: NURSE PRACTITIONER

## 2020-11-23 PROCEDURE — 74178 CT ABD&PLV WO CNTR FLWD CNTR: CPT

## 2020-11-23 PROCEDURE — 6360000004 HC RX CONTRAST MEDICATION: Performed by: UROLOGY

## 2020-11-23 PROCEDURE — 6370000000 HC RX 637 (ALT 250 FOR IP): Performed by: NURSE PRACTITIONER

## 2020-11-23 PROCEDURE — 85025 COMPLETE CBC W/AUTO DIFF WBC: CPT

## 2020-11-23 PROCEDURE — 72148 MRI LUMBAR SPINE W/O DYE: CPT

## 2020-11-23 PROCEDURE — APPSS30 APP SPLIT SHARED TIME 16-30 MINUTES: Performed by: NURSE PRACTITIONER

## 2020-11-23 PROCEDURE — 51798 US URINE CAPACITY MEASURE: CPT

## 2020-11-23 PROCEDURE — 99222 1ST HOSP IP/OBS MODERATE 55: CPT | Performed by: UROLOGY

## 2020-11-23 PROCEDURE — 2580000003 HC RX 258: Performed by: NURSE PRACTITIONER

## 2020-11-23 PROCEDURE — 36415 COLL VENOUS BLD VENIPUNCTURE: CPT

## 2020-11-23 RX ORDER — TAMSULOSIN HYDROCHLORIDE 0.4 MG/1
0.4 CAPSULE ORAL DAILY
Status: DISCONTINUED | OUTPATIENT
Start: 2020-11-23 | End: 2020-11-29 | Stop reason: HOSPADM

## 2020-11-23 RX ORDER — ASPIRIN 81 MG/1
81 TABLET, CHEWABLE ORAL DAILY
Status: DISCONTINUED | OUTPATIENT
Start: 2020-11-23 | End: 2020-11-28

## 2020-11-23 RX ADMIN — CEFTRIAXONE SODIUM 1 G: 1 INJECTION, POWDER, FOR SOLUTION INTRAMUSCULAR; INTRAVENOUS at 05:21

## 2020-11-23 RX ADMIN — Medication 10 ML: at 08:37

## 2020-11-23 RX ADMIN — ENOXAPARIN SODIUM 40 MG: 40 INJECTION SUBCUTANEOUS at 08:37

## 2020-11-23 RX ADMIN — INSULIN LISPRO 4 UNITS: 100 INJECTION, SOLUTION INTRAVENOUS; SUBCUTANEOUS at 18:14

## 2020-11-23 RX ADMIN — IOPAMIDOL 100 ML: 612 INJECTION, SOLUTION INTRAVENOUS at 17:24

## 2020-11-23 RX ADMIN — INSULIN LISPRO 2 UNITS: 100 INJECTION, SOLUTION INTRAVENOUS; SUBCUTANEOUS at 08:38

## 2020-11-23 RX ADMIN — INSULIN LISPRO 3 UNITS: 100 INJECTION, SOLUTION INTRAVENOUS; SUBCUTANEOUS at 13:47

## 2020-11-23 RX ADMIN — AZITHROMYCIN 500 MG: 500 INJECTION, POWDER, LYOPHILIZED, FOR SOLUTION INTRAVENOUS at 06:04

## 2020-11-23 RX ADMIN — Medication 10 ML: at 23:35

## 2020-11-23 RX ADMIN — TAMSULOSIN HYDROCHLORIDE 0.4 MG: 0.4 CAPSULE ORAL at 14:37

## 2020-11-23 RX ADMIN — ASPIRIN 81 MG CHEWABLE TABLET 81 MG: 81 TABLET CHEWABLE at 14:37

## 2020-11-23 ASSESSMENT — ENCOUNTER SYMPTOMS
CHEST TIGHTNESS: 0
SHORTNESS OF BREATH: 0
VOMITING: 0
WHEEZING: 0
TROUBLE SWALLOWING: 0
NAUSEA: 0
COUGH: 1
COLOR CHANGE: 0

## 2020-11-23 NOTE — FLOWSHEET NOTE
call placed to the wife to get MRI form filled out she dstates she does not know his history well and gave me the daughter casie's number she did not answer th phone. The pt states he has had a hernia repair and some plates and screws in his right arm but no other metal. I attempted to call mri x 3 , they did not answer the phone.  I asked the transporter to have the mri tech call me

## 2020-11-23 NOTE — PROGRESS NOTES
[facility] Occupational Therapy      Date: 2020  Patient Name: Corey Miles        MRN: [id number]  Account: [de-identified]   : 1950  (65 y.o.)  [facility]    Chart reviewed, attempted OT at  for eval. Patient not seen 2° to: Other: per RN pt leaving the unit to go to MRI for lumbar spine MRI. Hold until MRI results are back and plan in place if needed. Spoke to Sakshi French RN aware. Will attempt again when able.     Electronically signed by ARCHANA Green on 2020 at 11:14 AM

## 2020-11-23 NOTE — PROGRESS NOTES
Physical Therapy Missed Treatment   Facility/Department: [facility] MED SURG Z445/V252-46    NAME: Osmin Berrios    : 1950 (65 y.o.)  MRN: [id number]    Account: [de-identified]  Gender: male      PT referral received. Chart reviewed. PT spoke with RN. RN stating pt leaving unit to go to MRI for lumbar spine. Hold until after MRI results. Will follow and attempt PT evaluation again at earliest availability.        Martell Gaona, PT, 20 at 12:11 PM

## 2020-11-23 NOTE — PROGRESS NOTES
[facility] Neurology Daily Progress Note  Name: Elle Muñoz  Age: 65 y.o. Gender: male  CodeStatus: Full Code  Allergies: Pioglitazone    Chief Complaint:Extremity Weakness    Primary Care Provider: JERRICA Jaramillo CNP  InpatientTreatment Team: Treatment Team: Attending Provider: Annalee Hogan DO; Consulting Physician: Jos Younger MD; Registered Nurse: Ricardo Nascimento RN; Utilization Reviewer: Michael Subramanian RN; Respiratory Therapist (Day): Guido Velásquez RCP  Admission Date: 11/21/2020      HPI   Pt seen and examined for neuro follow up for acute metabolic encephalopathy in the setting of pneumonia with concern for syncope versus basilar TIA. Patient currently alert and oriented x3, no acute distress, cooperative. He is able to answer orientation questions appropriately but is definitely confused to situation and forgetful. No seizure activity reported. No obvious focal neurological deficits. Currently afebrile. Patient does report that approximately a month ago he developed a sudden change in vision to his left eye and has significant blurred vision. No disconjugate gaze evident. Vitals:    11/22/20 1937   BP: 124/68   Pulse: 86   Resp: 18   Temp: 100 °F (37.8 °C)   SpO2: 94%      Review of Systems   Constitutional: Negative for appetite change, chills, fatigue and fever. HENT: Negative for hearing loss and trouble swallowing. Eyes: Positive for visual disturbance (  Left eye). Respiratory: Positive for cough. Negative for chest tightness, shortness of breath and wheezing. Cardiovascular: Negative for chest pain, palpitations and leg swelling. Gastrointestinal: Negative for nausea and vomiting. Musculoskeletal: Negative for gait problem. Skin: Negative for color change and rash. Neurological: Negative for dizziness, tremors, seizures, syncope, facial asymmetry, speech difficulty, weakness, light-headedness, numbness and headaches.    Psychiatric/Behavioral: Positive for confusion. Negative for agitation and hallucinations. The patient is not nervous/anxious. Physical Exam  Vitals signs and nursing note reviewed. Constitutional:       General: He is not in acute distress. Appearance: He is not ill-appearing or diaphoretic. HENT:      Head: Normocephalic and atraumatic. Eyes:      General: No visual field deficit. Extraocular Movements: Extraocular movements intact. Pupils: Pupils are equal, round, and reactive to light. Cardiovascular:      Rate and Rhythm: Normal rate and regular rhythm. Pulmonary:      Effort: Pulmonary effort is normal. No respiratory distress. Breath sounds: Rhonchi present. Abdominal:      General: Bowel sounds are normal. There is no distension. Palpations: Abdomen is soft. Tenderness: There is no abdominal tenderness. Skin:     General: Skin is warm and dry. Neurological:      Mental Status: He is alert. He is disoriented. Cranial Nerves: No cranial nerve deficit, dysarthria or facial asymmetry. Motor: No weakness, tremor, atrophy, abnormal muscle tone or seizure activity.       Coordination: Finger-Nose-Finger Test normal.               Medications:  Reviewed    Infusion Medications:   Scheduled Medications:    sodium chloride flush  10 mL Intravenous 2 times per day    enoxaparin  40 mg Subcutaneous Daily    azithromycin  500 mg Intravenous Q24H    And    cefTRIAXone (ROCEPHIN) IV  1 g Intravenous Q24H    insulin lispro  0-6 Units Subcutaneous TID WC    insulin lispro  0-3 Units Subcutaneous Nightly     PRN Meds: sodium chloride flush, acetaminophen **OR** acetaminophen, polyethylene glycol, promethazine **OR** ondansetron, ipratropium-albuterol    Labs:   Recent Labs     11/21/20  2315 11/22/20  0807 11/23/20  0506   WBC 13.3* 12.5* 9.8   HGB 15.1 14.6 14.3   HCT 45.1 43.3 42.1    206 221     Recent Labs     11/21/20  2315 11/22/20  0807 11/23/20  0506   * 137 135   K 4.5 4.0 4.0  106 103   CO2 24 21 24   BUN 12 9 11   CREATININE 0.98 0.80 0.91   CALCIUM 9.7 8.7 9.1     Recent Labs     11/22/20  0807 11/23/20  0506   AST 11 12   ALT 14 12   BILITOT 0.9* 0.9*   ALKPHOS 50 47     No results for input(s): INR in the last 72 hours. Recent Labs     11/21/20  2315 11/22/20  1200   CKTOTAL 92 90   TROPONINI <0.010  --        Urinalysis:   Lab Results   Component Value Date    NITRU Negative 11/21/2020    WBCUA 0-2 11/21/2020    BACTERIA Negative 11/21/2020    RBCUA 3-5 11/21/2020    BLOODU Negative 11/21/2020    SPECGRAV 1.024 11/21/2020    GLUCOSEU >=1000 11/21/2020       Radiology:   Most recent    EEG No procedure found. MRI of Brain No results found for this or any previous visit. No results found for this or any previous visit. MRA of the Head and Neck: No results found for this or any previous visit. No results found for this or any previous visit. No results found for this or any previous visit. CT of the Head:   Results for orders placed during the hospital encounter of 11/21/20   CT Head WO Contrast    Narrative CT Brain    Contrast medium:  Not utilized. History: Altered mental status    Comparison:  CT brain, August 10, 2012 MRI brain, August 12, 2012    Findings:    Extra-axial spaces:  Normal.     Intracranial hemorrhage:  None. Ventricular system: Ventricles mildly enlarged. Sulci mildly prominent. Basal Cisterns:  Normal.    Cerebral Parenchyma: Bilateral symmetric periventricular areas decreased attenuation. Midline Shift:  None. Cerebellum:  Normal.     Paranasal sinuses and mastoid air cells:  Normal.    Visualized Orbits:  Normal.        Impression Impression:    No acute findings. Mild cerebral atrophy. Chronic ischemic white matter disease.       All CT scans at this facility use dose modulation, iterative reconstruction, and/or weight based dosing when appropriate to reduce radiation dose to as low as reasonably achievable. No results found for this or any previous visit. No results found for this or any previous visit. Carotid duplex: No results found for this or any previous visit. No results found for this or any previous visit. No results found for this or any previous visit. Echo No results found for this or any previous visit. Assessment/Plan:  11/22/20:  Presyncope or a basilar TIA this cannot be ruled out. Patient's leg weakness could be from lumbar canal stenosis. The incontinence is a red herring. Patient does have history of lumbar disc disease with lumbar canal stenosis seen at L3-L4. This was diagnosed in 2012 am not quite sure if anything has been done for it. I do not see a scar or any surgical intervention done and therefore patient require MRI of the lumbosacral spine. He is not quite aware of any other history. 11/23/20:  Presyncope versus basilar TIA, start aspirin, check lipid panel and hemoglobin P2T  Acute metabolic encephalopathy in the setting of pneumonia. Currently on antibiotics. Covid negative thus far. CT the head no acute findings, mild cerebral atrophy with chronic SVID  Carotid duplex pending  MRI of the lumbar spine pending  Given patient's presentation and risk factors for CVD and concern for TIA we will obtain MRI of brain as well    MRI lumbar spine shows severe stenosis l3-4,  Neurosurgery consult  Renal mass/  Urology on consult      I independently performed an evaluation on this patient. I have reviewed the above documentation completed by the Nurse Practitioner. Please see my additional contributions to the HPI, physical exam, assessment/medical decision making. Yves Saul MD, [address], American Board of Psychiatry & Neurology  Board Certified in Vascular Neurology  Board Certified in Neuromuscular Medicine  Certified in Neurorehabilitation       Collaborating physicians: Dr Panda Saul    Electronically signed by Neema Zabala APRN - CNP on 11/23/2020 at 9:47 AM

## 2020-11-23 NOTE — PROGRESS NOTES
Progress Note    2020   10:14 AM    Name:  Melody Dixon  MRN:    [id number]      Day: 1     Admit Date: 2020 10:50 PM  PCP: JERRICA Hernandez CNP    Code Status:  Full Code    Subjective:     Patient denies any new symptoms. No chest pain or dyspnea. Patient has issue with urinary retention last night. Polo was inserted. Physical Examination:      Vitals:  /68   Pulse 86   Temp 100 °F (37.8 °C) (Oral)   Resp 18   Ht 6' (1.829 m)   Wt 212 lb (96.2 kg)   SpO2 94%   BMI 28.75 kg/m²   Temp (24hrs), Av °F (37.8 °C), Min:100 °F (37.8 °C), Max:100 °F (37.8 °C)      General appearance: alert, cooperative and no distress  Mental Status: oriented to person, place and time and normal affect  Lungs: clear to auscultation bilaterally, normal effort  Heart: regular rate and rhythm, no murmur  Abdomen: soft, nontender, nondistended, bowel sounds present, no masses  Extremities: no edema, redness, tenderness in the calves  Skin: no gross lesions, rashes    Data:     Labs:  Recent Labs     20  0807 20  0506   WBC 12.5* 9.8   HGB 14.6 14.3    221     Recent Labs     20  0807 20  0506    135   K 4.0 4.0    103   CO2 21 24   BUN 9 11   CREATININE 0.80 0.91   GLUCOSE 272* 210*     Recent Labs     20  0807 20  0506   AST 11 12   ALT 14 12   BILITOT 0.9* 0.9*   ALKPHOS 50 47       Assessment and Plan:            Acute metabolic encephalopathy  Syncope  Fall  Possible pneumonia  L3-L4 spinal stenosis  Urinary retention    Plan  -Awaiting MRI of the L-spine as per neurology, appreciate neurology help.   MRI of the brain is also recommended by neurology   -PT/OT  -Follow-up carotid ultrasound  -Resume antibiotic, can be switched to oral antibiotic at discharge  -Start patient on Flomax, voiding trial prior to discharge          [facility] discharge home tomorrow if mental status continues to improve pending final neurology recommendations       Electronically signed by Meeta Kwok DO on 11/23/2020 at 10:14 AM

## 2020-11-24 ENCOUNTER — APPOINTMENT (OUTPATIENT)
Dept: MRI IMAGING | Age: 70
DRG: 193 | End: 2020-11-24
Payer: MEDICARE

## 2020-11-24 PROBLEM — M48.062 NEUROGENIC CLAUDICATION DUE TO LUMBAR SPINAL STENOSIS: Status: ACTIVE | Noted: 2020-11-24

## 2020-11-24 LAB
ALBUMIN SERPL-MCNC: 3.3 G/DL (ref 3.5–4.6)
ALP BLD-CCNC: 54 U/L (ref 35–104)
ALT SERPL-CCNC: 14 U/L (ref 0–41)
ANION GAP SERPL CALCULATED.3IONS-SCNC: 10 MEQ/L (ref 9–15)
AST SERPL-CCNC: 13 U/L (ref 0–40)
BASOPHILS ABSOLUTE: 0 K/UL (ref 0–0.2)
BASOPHILS RELATIVE PERCENT: 0.6 %
BILIRUB SERPL-MCNC: 0.9 MG/DL (ref 0.2–0.7)
BUN BLDV-MCNC: 11 MG/DL (ref 8–23)
CALCIUM SERPL-MCNC: 8.9 MG/DL (ref 8.5–9.9)
CHLORIDE BLD-SCNC: 105 MEQ/L (ref 95–107)
CO2: 22 MEQ/L (ref 20–31)
CREAT SERPL-MCNC: 0.73 MG/DL (ref 0.7–1.2)
EKG ATRIAL RATE: 234 BPM
EKG ATRIAL RATE: 93 BPM
EKG P AXIS: 36 DEGREES
EKG P-R INTERVAL: 138 MS
EKG Q-T INTERVAL: 198 MS
EKG Q-T INTERVAL: 346 MS
EKG QRS DURATION: 94 MS
EKG QRS DURATION: 98 MS
EKG QTC CALCULATION (BAZETT): 370 MS
EKG QTC CALCULATION (BAZETT): 430 MS
EKG R AXIS: 14 DEGREES
EKG R AXIS: 85 DEGREES
EKG T AXIS: -75 DEGREES
EKG T AXIS: 58 DEGREES
EKG VENTRICULAR RATE: 210 BPM
EKG VENTRICULAR RATE: 93 BPM
EOSINOPHILS ABSOLUTE: 0.1 K/UL (ref 0–0.7)
EOSINOPHILS RELATIVE PERCENT: 0.9 %
GFR AFRICAN AMERICAN: >60
GFR NON-AFRICAN AMERICAN: >60
GLOBULIN: 3.3 G/DL (ref 2.3–3.5)
GLUCOSE BLD-MCNC: 268 MG/DL (ref 70–99)
GLUCOSE BLD-MCNC: 299 MG/DL (ref 60–115)
GLUCOSE BLD-MCNC: 343 MG/DL (ref 60–115)
GLUCOSE BLD-MCNC: 394 MG/DL (ref 60–115)
GLUCOSE BLD-MCNC: 405 MG/DL (ref 60–115)
HCT VFR BLD CALC: 42.4 % (ref 42–52)
HEMOGLOBIN: 14.2 G/DL (ref 14–18)
LV EF: 60 %
LVEF MODALITY: NORMAL
LYMPHOCYTES ABSOLUTE: 1.4 K/UL (ref 1–4.8)
LYMPHOCYTES RELATIVE PERCENT: 17.7 %
MCH RBC QN AUTO: 30.9 PG (ref 27–31.3)
MCHC RBC AUTO-ENTMCNC: 33.4 % (ref 33–37)
MCV RBC AUTO: 92.5 FL (ref 80–100)
MONOCYTES ABSOLUTE: 0.6 K/UL (ref 0.2–0.8)
MONOCYTES RELATIVE PERCENT: 8 %
NEUTROPHILS ABSOLUTE: 5.7 K/UL (ref 1.4–6.5)
NEUTROPHILS RELATIVE PERCENT: 72.8 %
PDW BLD-RTO: 13.4 % (ref 11.5–14.5)
PERFORMED ON: ABNORMAL
PLATELET # BLD: 227 K/UL (ref 130–400)
POTASSIUM REFLEX MAGNESIUM: 4.1 MEQ/L (ref 3.4–4.9)
RBC # BLD: 4.59 M/UL (ref 4.7–6.1)
SODIUM BLD-SCNC: 137 MEQ/L (ref 135–144)
TOTAL PROTEIN: 6.6 G/DL (ref 6.3–8)
TROPONIN: <0.01 NG/ML (ref 0–0.01)
WBC # BLD: 7.8 K/UL (ref 4.8–10.8)

## 2020-11-24 PROCEDURE — 93005 ELECTROCARDIOGRAM TRACING: CPT | Performed by: INTERNAL MEDICINE

## 2020-11-24 PROCEDURE — 99233 SBSQ HOSP IP/OBS HIGH 50: CPT | Performed by: PSYCHIATRY & NEUROLOGY

## 2020-11-24 PROCEDURE — 6360000002 HC RX W HCPCS: Performed by: NURSE PRACTITIONER

## 2020-11-24 PROCEDURE — 2500000003 HC RX 250 WO HCPCS: Performed by: INTERNAL MEDICINE

## 2020-11-24 PROCEDURE — 36415 COLL VENOUS BLD VENIPUNCTURE: CPT

## 2020-11-24 PROCEDURE — 2580000003 HC RX 258: Performed by: NURSE PRACTITIONER

## 2020-11-24 PROCEDURE — 85025 COMPLETE CBC W/AUTO DIFF WBC: CPT

## 2020-11-24 PROCEDURE — 93010 ELECTROCARDIOGRAM REPORT: CPT | Performed by: INTERNAL MEDICINE

## 2020-11-24 PROCEDURE — 99223 1ST HOSP IP/OBS HIGH 75: CPT | Performed by: INTERNAL MEDICINE

## 2020-11-24 PROCEDURE — 2580000003 HC RX 258: Performed by: INTERNAL MEDICINE

## 2020-11-24 PROCEDURE — APPSS30 APP SPLIT SHARED TIME 16-30 MINUTES: Performed by: NURSE PRACTITIONER

## 2020-11-24 PROCEDURE — 80053 COMPREHEN METABOLIC PANEL: CPT

## 2020-11-24 PROCEDURE — 2060000000 HC ICU INTERMEDIATE R&B

## 2020-11-24 PROCEDURE — 84484 ASSAY OF TROPONIN QUANT: CPT

## 2020-11-24 PROCEDURE — 6370000000 HC RX 637 (ALT 250 FOR IP): Performed by: INTERNAL MEDICINE

## 2020-11-24 PROCEDURE — 97166 OT EVAL MOD COMPLEX 45 MIN: CPT

## 2020-11-24 PROCEDURE — 93306 TTE W/DOPPLER COMPLETE: CPT

## 2020-11-24 PROCEDURE — 99231 SBSQ HOSP IP/OBS SF/LOW 25: CPT | Performed by: UROLOGY

## 2020-11-24 PROCEDURE — 97163 PT EVAL HIGH COMPLEX 45 MIN: CPT

## 2020-11-24 PROCEDURE — 70551 MRI BRAIN STEM W/O DYE: CPT

## 2020-11-24 PROCEDURE — 6360000002 HC RX W HCPCS: Performed by: INTERNAL MEDICINE

## 2020-11-24 PROCEDURE — 6370000000 HC RX 637 (ALT 250 FOR IP): Performed by: NURSE PRACTITIONER

## 2020-11-24 RX ORDER — METOPROLOL TARTRATE 5 MG/5ML
5 INJECTION INTRAVENOUS ONCE
Status: COMPLETED | OUTPATIENT
Start: 2020-11-24 | End: 2020-11-24

## 2020-11-24 RX ORDER — 0.9 % SODIUM CHLORIDE 0.9 %
1000 INTRAVENOUS SOLUTION INTRAVENOUS ONCE
Status: COMPLETED | OUTPATIENT
Start: 2020-11-24 | End: 2020-11-24

## 2020-11-24 RX ADMIN — TAMSULOSIN HYDROCHLORIDE 0.4 MG: 0.4 CAPSULE ORAL at 09:00

## 2020-11-24 RX ADMIN — ENOXAPARIN SODIUM 60 MG: 60 INJECTION SUBCUTANEOUS at 11:46

## 2020-11-24 RX ADMIN — DILTIAZEM HYDROCHLORIDE 5 MG/HR: 5 INJECTION, SOLUTION INTRAVENOUS at 11:46

## 2020-11-24 RX ADMIN — DILTIAZEM HYDROCHLORIDE 5 MG/HR: 5 INJECTION, SOLUTION INTRAVENOUS at 20:04

## 2020-11-24 RX ADMIN — SODIUM CHLORIDE 1000 ML: 9 INJECTION, SOLUTION INTRAVENOUS at 15:08

## 2020-11-24 RX ADMIN — INSULIN LISPRO 5 UNITS: 100 INJECTION, SOLUTION INTRAVENOUS; SUBCUTANEOUS at 11:46

## 2020-11-24 RX ADMIN — Medication 10 ML: at 09:00

## 2020-11-24 RX ADMIN — CEFTRIAXONE SODIUM 1 G: 1 INJECTION, POWDER, FOR SOLUTION INTRAMUSCULAR; INTRAVENOUS at 05:52

## 2020-11-24 RX ADMIN — METOPROLOL TARTRATE 5 MG: 1 INJECTION, SOLUTION INTRAVENOUS at 10:15

## 2020-11-24 RX ADMIN — Medication 10 ML: at 20:05

## 2020-11-24 RX ADMIN — ASPIRIN 81 MG CHEWABLE TABLET 81 MG: 81 TABLET CHEWABLE at 09:00

## 2020-11-24 RX ADMIN — ENOXAPARIN SODIUM 100 MG: 100 INJECTION SUBCUTANEOUS at 20:05

## 2020-11-24 RX ADMIN — AZITHROMYCIN 500 MG: 500 INJECTION, POWDER, LYOPHILIZED, FOR SOLUTION INTRAVENOUS at 06:23

## 2020-11-24 RX ADMIN — INSULIN LISPRO 5 UNITS: 100 INJECTION, SOLUTION INTRAVENOUS; SUBCUTANEOUS at 17:02

## 2020-11-24 ASSESSMENT — ENCOUNTER SYMPTOMS
CHEST TIGHTNESS: 0
NAUSEA: 0
SHORTNESS OF BREATH: 0
WHEEZING: 0
ABDOMINAL PAIN: 0
VOMITING: 0
TROUBLE SWALLOWING: 0
COUGH: 0
COLOR CHANGE: 0

## 2020-11-24 ASSESSMENT — PAIN SCALES - GENERAL
PAINLEVEL_OUTOF10: 0

## 2020-11-24 NOTE — PROGRESS NOTES
Pt went into afib w/ rvr. Rate 200's. 'T systolic, pt asymptomatic. Will give lopressor 5 mg IV once. Move to [facility]. ?cardizem infusion if remains tachy. Cardiology consulted. Keep on tele. Obtain an Echo.

## 2020-11-24 NOTE — PROGRESS NOTES
Physical Therapy Med Surg Initial Assessment  Facility/Department: [facility]  Room: [place] [address]       NAME: Bridgette Reddy  : 1950 (65 y.o.)  MRN: [id number]  CODE STATUS: Full Code    Date of Service: 2020    Patient Diagnosis(es): Syncope and collapse [R55]  Community acquired pneumonia of both lower lobes [J18.9]   Chief Complaint   Patient presents with    Extremity Weakness     Patient Active Problem List    Diagnosis Date Noted    Neurogenic claudication due to lumbar spinal stenosis 2020    Hyperglycemia     Renal mass, left     Urinary retention     Spinal stenosis of lumbar region with neurogenic claudication     Weakness     Syncope and collapse 2020    Pneumonia 2020    Dementia ([address] 2020    Community acquired pneumonia of both lower lobes 2020    [facility] 1 disorder [facility] 10/01/2015    Diabetes ([facility]         Past Medical History:   Diagnosis Date    Bipolar 1 disorder ([facility] [address] [facility]     Cancer of skin of left ear     cured    Diabetes ([facility] [facility]     DM (diabetes mellitus) ([facility] [address] [facility]     Hyperlipidemia     Hypertension     Stroke [facility])      Past Surgical History:   Procedure Laterality Date    AV FISTULA REPAIR      COLONOSCOPY  10/21/15    w/polypectomy     HERNIA REPAIR      TONSILLECTOMY      TRACHEOTOMY         Chart Reviewed: Yes  Family / Caregiver Present: (1:1 sitter)    Restrictions:  Restrictions/Precautions: Fall Risk     SUBJECTIVE:      Pain       Pre and post Treatment Pain Screening: pt reports pain in right arm - unable to describe pain or give number.  Pt in no apparent distress however       Prior Level of Function:  Social/Functional History  Lives With: Significant other  Type of Home: House  Home Layout: Two level, Able to Live on Main level with bedroom/bathroom  Home Access: Stairs to enter without rails  Entrance Stairs - Number of Steps: 5  Bathroom Shower/Tub: Tub/Shower unit  ADL Assistance: Independent  Ambulation Assistance: Independent  Transfer Assistance: Independent  Active : Yes  Occupation: Retired  Type of occupation: [employer]  Additional Comments: unclear if above information is accurate    OBJECTIVE:   Vision: Within Functional Limits  Hearing: Exceptions to [facility]  Hearing Exceptions: Hard of hearing/hearing concerns    Cognition:  Overall Orientation Status: Impaired  Orientation Level: Oriented to person, Disoriented to situation, Disoriented to time, Disoriented to place         ROM:  RLE PROM: WFL  LLE PROM: WFL    Strength:  Strength RLE  Comment: 4-/5  Strength LLE  Comment: 4-/5    Neuro:  Balance  Posture: Good  Sitting - Static: Good  Sitting - Dynamic: Good  Standing - Static: Poor(pt requires min assist for standing with no UE support with LOB and delayed balance reactions)  Standing - Dynamic: Poor(unsafe to have pt ambulate without devices due to delayed balance reactions and cognitive deficits.)  Comments: Pt utilized ww for gait with lat LOB and+1 assist for balance required             Bed mobility  Rolling to Left: Stand by assistance  Rolling to Right: Stand by assistance  Supine to Sit: Stand by assistance  Sit to Supine: Unable to assess(pt up in chair following gait)    Transfers  Sit to Stand: Minimal Assistance; Moderate Assistance  Stand to sit: Minimal Assistance; Moderate Assistance  Bed to Chair: Minimal assistance;2 Person Assistance(with ww)    Ambulation  Ambulation?: Yes  Ambulation 1  Surface: level tile  Device: Rolling Walker  Assistance:  Moderate assistance;Minimal assistance;2 Person assistance  Quality of Gait: narrow GELACIO, variable balance, variable use of ww, poor awareness of limitations, poor carry over of instructions  Distance: 10 ft; 30 ft  Comments: pt safety and lat LOB a concern necessitating 2 people present for safety although one person only needed for assistance this date    Stairs/Curb  Stairs?: No         Activity Tolerance  Activity Tolerance: Patient Tolerated treatment well          PT Education  PT Education: PT Role;Goals;Plan of Care    ASSESSMENT:   Body structures, Functions, Activity limitations: Decreased functional mobility ; Decreased safe awareness;Decreased balance;Decreased strength;Decreased endurance;Decreased cognition  Decision Making: High Complexity  History: high  Exam: high  Clinical Presentation: high    Prognosis: Good  Barriers to Learning: cognition    DISCHARGE RECOMMENDATIONS:  Discharge Recommendations: Continue to assess pending progress    Assessment: Pt demonstrates deficits as listed. He is requiring assistance with all mobility at this time. He was previously at an indep level of function. Pt is in need of PT prior to safe return to home  REQUIRES PT FOLLOW UP: Yes      PLAN OF CARE:  Plan  Times per week: 3-6  Current Treatment Recommendations: Transfer Training, Endurance Training, Neuromuscular Re-education, Patient/Caregiver Education & Training, Equipment Evaluation, Education, & procurement, Balance Training, Gait Training, Stair training, Safety Education & Training, Functional Mobility Training  Safety Devices  Type of devices: Sitter present    Goals:  Short term goals  Short term goal 1: min assist +1 transfers  Short term goal 2: min assist +1 gait with ww 50 feet  Short term goal 3: pt able to stand without UE support 2 min with SBA  Long term goals  Long term goal 1: SBA bed mobiliyt  Long term goal 2: SBA sit to stand  Long term goal 3: SBA gait with or without device 50 feet    [facility] (6 CLICK) [address] Mobility Raw Score : 14     Therapy Time:   Individual   Time In 0830   Time Out 0901   Minutes [address] [place], [place], 11/24/20 at 9:08 AM         Definitions for assistance levels  Independent = pt does not require any physical supervision or assistance from another person for activity completion. Device may be needed.   Stand by assistance = pt

## 2020-11-24 NOTE — PROGRESS NOTES
Progress Note    2020   9:22 AM    Name:  Rita Linares  MRN:    [id number]     IP Day: 2     Admit Date: 2020 10:50 PM  PCP: JERRICA Valdovinos CNP    Code Status:  Full Code    Subjective:     Patient denies any new symptoms. No chest pain or dyspnea.       Physical Examination:      Vitals:  /68   Pulse 82   Temp 99.7 °F (37.6 °C) (Oral)   Resp 18   Ht 6' (1.829 m)   Wt 212 lb (96.2 kg)   SpO2 95%   BMI 28.75 kg/m²   Temp (24hrs), Av.1 °F (37.3 °C), Min:98.4 °F (36.9 °C), Max:99.7 °F (37.6 °C)      General appearance: alert, cooperative and no distress  Mental Status: oriented to person, place and time and normal affect  Lungs: clear to auscultation bilaterally, normal effort  Heart: regular rate and rhythm, no murmur  Abdomen: soft, nontender, nondistended, bowel sounds present, no masses  Extremities: no edema, redness, tenderness in the calves  Skin: no gross lesions, rashes    Data:     Labs:  Recent Labs     20  0506 20  0537   WBC 9.8 7.8   HGB 14.3 14.2    227     Recent Labs     20  0506 20  0537    137   K 4.0 4.1    105   CO2 24 22   BUN 11 11   CREATININE 0.91 0.73   GLUCOSE 210* 268*     Recent Labs     20  0506 20  0537   AST 12 13   ALT 12 14   BILITOT 0.9* 0.9*   ALKPHOS 47 54       Assessment and Plan:            Acute metabolic encephalopathy  Syncope  Fall  Possible pneumonia  L3-L4 spinal stenosis- severe   Urinary retention  Renal mass- concerning for RCC    Plan  - MRI of the L-spine noted, seen by [facility], Dr Monda Koyanagi, will see as outpt for decompression   - urology seen for renal mass, will need outpt follow up. urology to arrange follow up   - MRI of the brain is also recommended by neurology   -PT/OT  - carotid ultrasound with mild findings   - switched to oral antibiotic   -resume  [place], [place] to stay  - Pt/OT  - likely needs placement          DISCHARGE PLANNING  Dc pending urology, PT/OT, possible placement        Electronically signed by Katharina Fleming DO on 11/24/2020 at 9:22 AM

## 2020-11-24 NOTE — PROGRESS NOTES
[facility] Neurology Daily Progress Note  Name: Stephany Aldana  Age: 65 y.o. Gender: male  CodeStatus: Full Code  Allergies: Pioglitazone    Chief Complaint:Extremity Weakness    Primary Care Provider: JERRICA Kong CNP  InpatientTreatment Team: Treatment Team: Attending Provider: Ca Horn DO; Consulting Physician: Phoebe Chun MD; Utilization Reviewer: Juan Carlos Mcdaniel RN; Consulting Physician: Marisol Martinez MD; Consulting Physician: Adria Curling, MD; : Ingrid Hammonds RN; Patient Care Tech: Ellen Olivares; Patient Care Tech: Skip Hartmann; Nursing Student: Kit Kb; Consulting Physician: Ainsley Sandoval DO; Registered Nurse: Doe Layton RN; Consulting Physician: Rudi Mcgarry MD  Admission Date: 11/21/2020      HPI Pt seen and examined for neuro follow up for acute metabolic encephalopathy in the setting of pneumonia with concern for syncope versus basilar TIA. Patient currently alert and oriented x3, no acute distress, cooperative. He is able to answer orientation questions appropriately but is definitely confused to situation and forgetful. No seizure activity reported. No obvious focal neurological deficits. Currently afebrile. Patient does report that approximately a month ago he developed a sudden change in vision to his left eye and has significant blurred vision. No disconjugate gaze evident. Patient now found to be in new onset atrial fibrillation. On Cardizem drip and full dose Lovenox. Vitals:    11/24/20 1352   BP: 110/68   Pulse: 81   Resp: 18   Temp: 98.5 °F (36.9 °C)   SpO2:       Review of Systems   Constitutional: Negative for fatigue and fever. HENT: Negative for hearing loss and trouble swallowing. Eyes: Positive for visual disturbance (  Left eye). Respiratory: Negative for cough, chest tightness, shortness of breath and wheezing. Cardiovascular: Negative for chest pain, palpitations and leg swelling.    Gastrointestinal: Negative for nausea and vomiting. Skin: Negative for color change and rash. Neurological: Negative for dizziness, tremors, seizures, syncope, facial asymmetry, speech difficulty, weakness, light-headedness, numbness and headaches. Psychiatric/Behavioral: Positive for confusion. Negative for agitation and hallucinations. The patient is not nervous/anxious. Physical Exam  Vitals signs and nursing note reviewed. Constitutional:       General: He is not in acute distress. Appearance: He is not diaphoretic. HENT:      Head: Normocephalic and atraumatic. Eyes:      General: No visual field deficit. Extraocular Movements: Extraocular movements intact. Pupils: Pupils are equal, round, and reactive to light. Cardiovascular:      Rate and Rhythm: Normal rate and regular rhythm. Pulmonary:      Effort: Pulmonary effort is normal. No respiratory distress. Breath sounds: Normal breath sounds. Skin:     General: Skin is warm and dry. Neurological:      Mental Status: He is alert. He is disoriented. Cranial Nerves: No cranial nerve deficit, dysarthria or facial asymmetry. Motor: No weakness, tremor or seizure activity.       Coordination: Finger-Nose-Finger Test normal.               Medications:  Reviewed    Infusion Medications:    dilTIAZem (CARDIZEM) 125 mg in dextrose 5% 125 mL infusion 5 mg/hr (11/24/20 1146)     Scheduled Medications:    sodium chloride  1,000 mL Intravenous Once    enoxaparin  1 mg/kg Subcutaneous BID    aspirin  81 mg Oral Daily    tamsulosin  0.4 mg Oral Daily    sodium chloride flush  10 mL Intravenous 2 times per day    azithromycin  500 mg Intravenous Q24H    And    cefTRIAXone (ROCEPHIN) IV  1 g Intravenous Q24H    insulin lispro  0-6 Units Subcutaneous TID WC    insulin lispro  0-3 Units Subcutaneous Nightly     PRN Meds: sodium chloride flush, acetaminophen **OR** acetaminophen, polyethylene glycol, promethazine **OR** ondansetron, ipratropium-albuterol    Labs:   Recent Labs     11/22/20  0807 11/23/20  0506 11/24/20  0537   WBC 12.5* 9.8 7.8   HGB 14.6 14.3 14.2   HCT 43.3 42.1 42.4    221 227     Recent Labs     11/22/20  0807 11/23/20  0506 11/24/20  0537    135 137   K 4.0 4.0 4.1    103 105   CO2 21 24 22   BUN 9 11 11   CREATININE 0.80 0.91 0.73   CALCIUM 8.7 9.1 8.9     Recent Labs     11/22/20  0807 11/23/20  0506 11/24/20  0537   AST 11 12 13   ALT 14 12 14   BILITOT 0.9* 0.9* 0.9*   ALKPHOS 50 47 54     No results for input(s): INR in the last 72 hours. Recent Labs     11/21/20  2315 11/22/20  1200 11/24/20  1008   CKTOTAL 92 90  --    TROPONINI <0.010  --  <0.010       Urinalysis:   Lab Results   Component Value Date    NITRU Negative 11/21/2020    WBCUA 0-2 11/21/2020    BACTERIA Negative 11/21/2020    RBCUA 3-5 11/21/2020    BLOODU Negative 11/21/2020    SPECGRAV 1.024 11/21/2020    GLUCOSEU >=1000 11/21/2020       Radiology:   Most recent    EEG No procedure found. MRI of Brain No results found for this or any previous visit. No results found for this or any previous visit. MRA of the Head and Neck: No results found for this or any previous visit. No results found for this or any previous visit. No results found for this or any previous visit. CT of the Head:   Results for orders placed during the hospital encounter of 11/21/20   CT Head WO Contrast    Narrative CT Brain    Contrast medium:  Not utilized. History: Altered mental status    Comparison:  CT brain, August 10, 2012 MRI brain, August 12, 2012    Findings:    Extra-axial spaces:  Normal.     Intracranial hemorrhage:  None. Ventricular system: Ventricles mildly enlarged. Sulci mildly prominent. Basal Cisterns:  Normal.    Cerebral Parenchyma: Bilateral symmetric periventricular areas decreased attenuation. Midline Shift:  None.     Cerebellum:  Normal.     Paranasal sinuses and mastoid air cells:  Normal.    Visualized Orbits:  Normal.        Impression Impression:    No acute findings. Mild cerebral atrophy. Chronic ischemic white matter disease. All CT scans at this facility use dose modulation, iterative reconstruction, and/or weight based dosing when appropriate to reduce radiation dose to as low as reasonably achievable. No results found for this or any previous visit. No results found for this or any previous visit. Carotid duplex: No results found for this or any previous visit. No results found for this or any previous visit. Results for orders placed during the hospital encounter of 11/21/20   US CAROTID ARTERY BILATERAL    Narrative US CAROTID ARTERY BILATERAL: 11/22/2020    CLINICAL HISTORY:  stenosis . History of atherosclerotic disease. COMPARISON: 3/28/2008. Grayscale, color and waveform Doppler analysis of the cervical carotid and vertebral arteries was performed. Validated velocity measurements with angiographic measurements, velocity criteria are extrapolated from diameter data as defined by the Society of Radiologist in [address] [place] [address] Drive Radiology 2003; [id number]. FINDINGS:    There is mild to moderate irregular atherosclerotic plaquing of the carotid bulbs without significantly elevated velocities, spectral waveform broadening, or incidental findings of concern identified. There is antegrade flow in both vertebral arteries. ARTERIAL BLOOD FLOW VELOCITY    RIGHT Peak Systolic                                                  Prox CCA:  116 cm/s               Mid CCA:  131 cm/s                Dist CCA:  1 cm/s                Prox ICA:  102 cm/s                 Mid ICA:  80 cm/s              Dist ICA:  70 cm/s               Prox ECA:  173 cm/s               Prox VERT:  66 cm/s                  ICA/CCA    0.70, which indicates less than 50% narrowing by velocity criteria.       LEFT Peak Systolic    Prox CCA:  554 cm/s Mid CCA:  117 cm/s                Dist CCA:  123 cm/s                Prox ICA:  107 cm/s                 Mid ICA:  86 cm/s              Dist ICA:  87 cm/s               Prox ECA:  83 cm/s               Prox VERT:  47 cm/s                     ICA/CCA     0.91, which indicates less than 50% narrowing by velocity criteria. Impression MILD TO MODERATE ATHEROSCLEROTIC PLAQUING OF THE CAROTID BULBS WITHOUT EVIDENCE OF A FLOW-LIMITING STENOSIS. Echo No results found for this or any previous visit. Assessment/Plan:    11/22/20:  Presyncope or a basilar TIA this cannot be ruled out. Patient's leg weakness could be from lumbar canal stenosis.  The incontinence is a red herring.  Patient does have history of lumbar disc disease with lumbar canal stenosis seen at L3-L4.  This was diagnosed in 2012 am not quite sure if anything has been done for it.  I do not see a scar or any surgical intervention done and therefore patient require MRI of the lumbosacral spine. [facility] quite [facility] of any other history.     11/23/20:  Presyncope versus basilar TIA, start aspirin, check lipid panel and hemoglobin N8K  Acute metabolic encephalopathy in the setting of pneumonia. Currently on antibiotics. Covid negative thus far. CT the head no acute findings, mild cerebral atrophy with chronic SVID  Carotid duplex pending  MRI of the lumbar spine pending  Given patient's presentation and risk factors for CVD and concern for TIA we will obtain MRI of brain as well     MRI lumbar spine shows severe stenosis l3-4,  Neurosurgery consult  Renal mass/  Urology on consult    11/24/20:  New onset atrial fibrillation, Cardizem drip with full dose oral anticoagulation  MRI of the lumbar spine showed severe L3-4 canal stenosis with neurogenic claudication and acute urinary retention.   Patient to be followed on outpatient basis due to current medical comorbidities  MRI of brain seen by me officially not read there is no session of any strokes. Patient has not sustained any residuals of cerebrovascular events. Patient is a high risk for cerebrovascular disease given the atrial fibrillation. Yves Villagran MD, [address], American Board of Psychiatry & Neurology  Board Certified in Vascular Neurology  Board Certified in Neuromuscular Medicine  Certified in Neurorehabilitation       Collaborating physicians: Dr Nikko Villagran    Electronically signed by JERRICA Maloney CNP on 11/24/2020 at 2:33 PM Patent

## 2020-11-24 NOTE — CONSULTS
Chief Complaint   Patient presents with    Extremity Weakness        Patient is a 65 y.o. male who presents with a chief complaint of altered mental status/syncope. Patient is followed on a regular basis by JERRICA Aguilar CNP. Patient is currently seen on cardiac unit, he is very confused unable to provide any detailed information. Most of the information obtained from the staff as well as the chart. Patient with history of diabetes, hyperlipidemia, hypertension and CVA. Apparently patient had an unknown downtime from syncopal episode. Patient was apparently out at dinner with his wife and when he was getting out of his car he became weak and went down to his knees and had incontinence. No previous cardiac history. No previous history myocardial infarction congestive heart failure arrhythmia. No records of cardiac catheterization or stress test.  Patient was noted to have new onset atrial fibrillation with rapid ventricular response and placed on Cardizem drip. He was evaluated by neurology as well as urology. Patient with possible left kidney renal cell carcinoma. His MRI of the spine shows severe canal stenosis at the L3-L4 level. 2D echocardiogram is pending.     Past Medical History:   Diagnosis Date    Bipolar 1 disorder ([address]     Cancer of skin of left ear     cured    Diabetes ([address]     DM (diabetes mellitus) ([address]     Hyperlipidemia     Hypertension     Stroke [facility])       Patient Active Problem List   Diagnosis    Bipolar 1 disorder ([address]    Diabetes ([address]    Syncope and collapse    Pneumonia    Dementia ([address]    Community acquired pneumonia of both lower lobes    Hyperglycemia    Renal mass, left    Urinary retention    Spinal stenosis of lumbar region with neurogenic claudication    Weakness    Neurogenic claudication due to lumbar spinal stenosis       Past Surgical History:   Procedure Laterality Date    AV FISTULA REPAIR      COLONOSCOPY  10/21/15 w/polypectomy     HERNIA REPAIR      TONSILLECTOMY      TRACHEOTOMY         Social History     Socioeconomic History    Marital status:      Spouse name: None    Number of children: None    Years of education: None    Highest education level: None   Occupational History    None   Social Needs    Financial resource strain: None    Food insecurity     Worry: None     Inability: None    Transportation needs     Medical: None     Non-medical: None   Tobacco Use    Smoking status: Former Smoker    Smokeless tobacco: Never Used   Substance and Sexual Activity    Alcohol use: No     Alcohol/week: 0.0 standard drinks    Drug use: No    Sexual activity: None   Lifestyle    Physical activity     Days per week: None     Minutes per session: None    Stress: None   Relationships    Social connections     Talks on phone: None     Gets together: None     Attends Mor[employer] service: None     Active member of club or organization: None     Attends meetings of clubs or organizations: None     Relationship status: None    Intimate partner violence     Fear of current or ex partner: None     Emotionally abused: None     Physically abused: None     Forced sexual activity: None   Other Topics Concern    None   Social History Narrative    None       Family History   Problem Relation Age of Onset    Diabetes Mother     Heart Disease Mother     Stroke Father     Heart Disease Father     Cancer Brother     Stroke Sister        Current Facility-Administered Medications   Medication Dose Route Frequency Provider Last Rate Last Dose    0.9 % sodium chloride bolus  1,000 mL Intravenous Once Margarette Read, DO        enoxaparin (LOVENOX) injection 100 mg  1 mg/kg Subcutaneous BID Margarette Read, DO        dilTIAZem 125 mg in dextrose 5 % 125 mL infusion  5 mg/hr Intravenous Continuous Lisbeth Sarah, DO 5 mL/hr at 11/24/20 1146 5 mg/hr at 11/24/20 1146    aspirin chewable tablet 81 mg  81 mg Oral Daily Radha Handsome, APRN - CNP   81 mg at 11/24/20 0900    tamsulosin (FLOMAX) capsule 0.4 mg  0.4 mg Oral Daily Lonza Brendan, DO   0.4 mg at 11/24/20 0900    sodium chloride flush 0.9 % injection 10 mL  10 mL Intravenous 2 times per day Rose Nicole RN, NP   10 mL at 11/24/20 0900    sodium chloride flush 0.9 % injection 10 mL  10 mL Intravenous PRN Rose Nicole RN, NP        acetaminophen (TYLENOL) tablet 650 mg  650 mg Oral Q6H PRN Rose Nicole RN, JENNIFER        Or    acetaminophen (TYLENOL) suppository 650 mg  650 mg Rectal Q6H PRN Rose Nicole RN, NP        polyethylene glycol (GLYCOLAX) packet 17 g  17 g Oral Daily PRN Rose Nicole RN, NP        promethazine (PHENERGAN) tablet 12.5 mg  12.5 mg Oral Q6H PRN Rose Nicole RN, JENNIFER        Or    ondansetron (ZOFRAN) injection 4 mg  4 mg Intravenous Q6H PRN Rose Nicole RN, NP        ipratropium-albuterol (DUONEB) nebulizer solution 1 ampule  1 ampule Inhalation Q4H PRN Rose Nicole RN, NP        azithromycin (ZITHROMAX) 500 mg in D5W 250ml addavial  500 mg Intravenous Q24H Rose Nicole RN, NP   Stopped at 11/24/20 0723    And    cefTRIAXone (ROCEPHIN) 1 g IVPB in 50 mL D5W minibag  1 g Intravenous Q24H Rose Nicole RN, NP   Stopped at 11/24/20 0621    insulin lispro (HUMALOG) injection vial 0-6 Units  0-6 Units Subcutaneous TID [facility] DO   5 Units at 11/24/20 1146    insulin lispro (HUMALOG) injection vial 0-3 Units  0-3 Units Subcutaneous Nightly [facility] DO   2 Units at 11/23/20 2328       ALLERGIES: Pioglitazone    Review of Systems   Unable to perform ROS: Mental status change         VITALS:  Blood pressure 105/66, pulse 78, temperature 98.8 °F (37.1 °C), resp. rate 18, height 6' (1.829 m), weight 212 lb (96.2 kg), SpO2 95 %. Body mass index is 28.75 kg/m².     Physical Exam   Constitutional: He is oriented to person, place, and time. He appears well-developed and well-nourished. HENT:   Head: Normocephalic and atraumatic. Eyes: Pupils are equal, round, and reactive to light. Neck: Normal range of motion. Neck supple. No JVD present. No tracheal deviation present. No thyromegaly present. Cardiovascular: Normal heart sounds and intact distal pulses. An irregular rhythm present. Tachycardia present. PMI is not displaced. Exam reveals no gallop, no S3, no distant heart sounds and no friction rub. No murmur heard. Pulmonary/Chest: No respiratory distress. He has no wheezes. He has no rales. He exhibits no tenderness. Abdominal: Soft. Bowel sounds are normal. He exhibits no distension and no mass. There is no abdominal tenderness. There is no rebound and no guarding. Musculoskeletal:         General: No edema. Neurological: He is alert and oriented to person, place, and time. No cranial nerve deficit. Skin: Skin is warm and dry. No rash noted. He is not diaphoretic. No erythema. No pallor. Psychiatric: He has a normal mood and affect.  His behavior is normal. Judgment and thought content normal.       LABS:  Recent Results (from the past 24 hour(s))   Lactic acid, plasma    Collection Time: 11/23/20  1:39 PM   Result Value Ref Range    Lactic Acid 1.2 0.5 - 2.2 mmol/L   Lipid Panel    Collection Time: 11/23/20  1:39 PM   Result Value Ref Range    Cholesterol, Total 103 0 - 199 mg/dL    Triglycerides 152 (H) 0 - 150 mg/dL    HDL 31 (L) 40 - 59 mg/dL    LDL Calculated 42 0 - 129 mg/dL   HEMOGLOBIN A1C    Collection Time: 11/23/20  1:39 PM   Result Value Ref Range    Hemoglobin A1C 9.4 (H) 4.8 - 5.9 %   POCT Glucose    Collection Time: 11/23/20  4:36 PM   Result Value Ref Range    POC Glucose 308 (H) 60 - 115 mg/dl    Performed on ACCU-CHEK    POCT Glucose    Collection Time: 11/23/20 11:08 PM   Result Value Ref Range    POC Glucose 321 (H) 60 - 115 mg/dl    Performed on ACCU-CHEK    Comprehensive Metabolic syncope/fall-rule out secondary to arrhythmia versus valvular heart disease versus other. Rule out cardiac ischemia. Essential hypertension  Hyperlipidemia  History of diabetes  Renal mass questionable renal cell carcinoma  Urinary retention  Severe L3-L4 spinal stenosis  Questionable pneumonia    PLAN:   1. As always, aggressive risk factor modification is strongly recommended. We should adhere to the JNC VIII guidelines for HTN management and the NCEPATP III guidelines for LDL-C management. 2. Check 2D Echo for LV function, PA pressures, wall motion abnormalities and any significant valvular disease. 3. Heart rate control, wean off Cardizem drip as tolerated  4. Full dose oral anticoagulation   5. Monitor on telemetry  6. Keep potassium greater than 4, magnesium greater than 2  7. GI/DVT prophylaxis  8. Further recommendations to follow      Thank you for allowing me to participate in the care of your patient, please don't hesitate to contact me if you have any further questions.     Electronically signed by Wesley Knowles DO on 11/24/2020 at 1:44 PM

## 2020-11-24 NOTE — FLOWSHEET NOTE
Pt heart rate in the 200 range EKG obtained and RN talking with the doctor New orders to send to [address] Notified wife of pt's new room 186 gave her the the visiting hours. Electronically signed by Latanya Lewis LPN on 59/88/5759 at 10:38 AM   Talked to pt's daughter and Keven Valles gave her the room number. Her number is [phone].name Shanae Chase. Electronically signed by Latanya Lewis LPN on 73/45/0403 at 10:44 AM

## 2020-11-24 NOTE — CONSULTS
49-year-old male brought in through emergency room for an episode of syncope. Past history of diabetes high blood pressure has had a CVA in the past.    The patient is pleasantly confused this morning and unable to obtain a history. He sits up in the bed independently he asked questions but does not understand answers. He can give me his home address but he does not know where he is. Unable to respond appropriately to directions and tries to answer short questions. Exam shows he will stare focuses. Cranial nerves were intact but he is difficulty cooperating for exam.  He has good strength in the upper and lower extremities on individual muscle strength testing sensation is intact. Lungs had decreased breath sounds heart tones showed no murmurs abdomen soft. He appears unkempt. Polo catheter in place for acute urinary retention. Review of systems unreliable. MRI lumbar spine indicates possible left kidney renal cell carcinoma. He also has very severe canal stenosis at L3-4 which could be contributing to his urinary retention. Very likely this is causing significant neurogenic claudication. Because of his confusion and medical comorbidities we will plan to follow this patient up on an outpatient basis in the next few weeks. If he stabilizes he would be a candidate for lumbar decompression. Impression 1. Severe L3-4 canal stenosis with neurogenic claudication acute urinary retention. 2.  Left kidney possible renal cell carcinoma. 3.  Confusion encephalopathy undergoing work-up. 4.  Hypertension 5. Diabetes mellitus.

## 2020-11-24 NOTE — PROGRESS NOTES
TID WC  insulin lispro (HUMALOG) injection vial 0-3 Units, Nightly        Data:     Code Status:  Full Code    Family History   Problem Relation Age of Onset    Diabetes Mother     Heart Disease Mother     Stroke Father     Heart Disease Father     Cancer Brother     Stroke Sister        Social History     Socioeconomic History    Marital status:      Spouse name: Not on file    Number of children: Not on file    Years of education: Not on file    Highest education level: Not on file   Occupational History    Not on file   Social Needs    Financial resource strain: Not on file    Food insecurity     Worry: Not on file     Inability: Not on file    Transportation needs     Medical: Not on file     Non-medical: Not on file   Tobacco Use    Smoking status: Former Smoker    Smokeless tobacco: Never Used   Substance and Sexual Activity    Alcohol use: No     Alcohol/week: 0.0 standard drinks    Drug use: No    Sexual activity: Not on file   Lifestyle    Physical activity     Days per week: Not on file     Minutes per session: Not on file    Stress: Not on file   Relationships    Social connections     Talks on phone: Not on file     Gets together: Not on file     Attends Orthodox service: Not on file     Active member of club or organization: Not on file     Attends meetings of clubs or organizations: Not on file     Relationship status: Not on file    Intimate partner violence     Fear of current or ex partner: Not on file     Emotionally abused: Not on file     Physically abused: Not on file     Forced sexual activity: Not on file   Other Topics Concern    Not on file   Social History Narrative    Not on file       Vitals:  /68   Pulse 81   Temp 98.5 °F (36.9 °C)   Resp 18   Ht 6' (1.829 m)   Wt 212 lb (96.2 kg)   SpO2 95%   BMI 28.75 kg/m²   Temp (24hrs), Av.9 °F (37.2 °C), Min:98.4 °F (36.9 °C), Max:99.7 °F (37.6 °C)    Recent Labs     20  1636 20  6130      Technique: Multiple contiguous axial images were obtained of the abdomen and pelvis from an level of the lung bases through the ischial tuberosities without and with contrast. Multiplanar reformats were obtained. Delayed images were obtained.         Comparison: MRI of the lumbar spine performed earlier on the same date and MRI of the lumbar spine from August 12, 2012         Findings:         Small left pleural effusion. Minimal bibasilar atelectasis. Heart size is at the upper limits of normal. Small to moderate pericardial effusion.         The gallbladder is partially contracted but otherwise unremarkable. The liver, spleen, stomach, pancreas, and left adrenal gland are within normal limits. A 2 cm right adrenal lesion is not significant changed from MRI of the lumbar spine from August 12, 2012 and is most likely an adenoma.         Precontrast images demonstrate no urinary tract calculi. Postcontrast images demonstrate a heterogenous enhancing left renal lesion measuring approximately 6 cm in AP dimension by 6 cm in transverse dimension by 5 cm in craniocaudal dimension. This    lesion contains multiple small calcifications. No left renal vein thrombosis identified. 2.7 cm left renal cyst and 1.5 cm right renal cyst. The urinary bladder is decompressed around a Polo catheter and demonstrates circumferential wall thickening. Air     is present within urinary bladder, likely secondary to Polo catheter.         Abdominal aorta is nonaneurysmal  and demonstrates atherosclerotic calcification . No retroperitoneal or abdominal/pelvic lymphadenopathy.         No small bowel obstruction. No overt colonic mass or pericolonic inflammation. Appendix is within normal limits. No free fluid or free air.         No acute osseous abnormality. Degenerative changes of the spine.  No osteolytic or osteoblastic lesion identified.              Impression         6 x 6 x 5 cm left renal mass is considered renal cell carcinoma until proven otherwise. No left renal vein thrombosis.         Circumferential wall thickening of the urinary bladder. Correlation with urinalysis recommended to exclude cystitis.         Small to moderate pericardial effusion.         Small left pleural effusion.              All CT scans at this facility use dose modulation, iterative reconstruction, and/or weight based dosing when appropriate to reduce radiation dose to as low as reasonably achievable.             Assessment:        Primary Problem    This is a 66 yo male with DM, Bipolar Dz, HTN, CVA admitted for MS changes and syncope and with incidental Lt renal mass confirmed on recent CT Urogram and Renal U/S. The management for this can be arranged as an outpatient once his acute medical issues have resolved. Pneumonia     Active Hospital Problems    Diagnosis Date Noted    Neurogenic claudication due to lumbar spinal stenosis [M48.062] 11/24/2020    Hyperglycemia [R73.9]     Renal mass, left [N28.89]     Urinary retention [R33.9]     Spinal stenosis of lumbar region with neurogenic claudication [M48.062]     Weakness [R53.1]     Syncope and collapse [R55] 11/22/2020    Pneumonia [J18.9] 11/22/2020    Dementia ([facility] [F03.90] 11/22/2020    Community acquired pneumonia of both lower lobes [J18.9] 11/22/2020    Bipolar 1 disorder ([address] [F31.9] 10/01/2015    Diabetes ([facility] [E11.9]          Plan:        1.  Will arrange for F/U in my office on D/C to discuss management for Lt renal mass      Electronically signed by Mateo Jacobs MD on 11/24/2020 at 2:43 PM

## 2020-11-24 NOTE — CONSULTS
AV  fistula repair, hernia repair. FAMILY HISTORY:  There is no history of genitourinary malignancies in  the family. SOCIAL HISTORY:  He reports he has never smoked. ALLERGIES:  He has allergies to PIOGLITAZONE.    HOME MEDICATIONS:  Include Glucotrol, Lamictal, Geodon, Glucophage,  simvastatin, Prinivil, aspirin. REVIEW OF SYSTEMS:  He denies chest pain, shortness of breath, diarrhea,  abdominal or flank pains, otherwise negative and noncontributory unless  as outlined in the history of present illness. PHYSICAL EXAMINATION:  GENERAL:  He is a well-developed, well-nourished male lying in bed in no  obvious acute distress. VITAL SIGNS:  His temperature is 37.3, heart rate 83, respiratory rate  18, blood pressure 111/70. HEENT:  Atraumatic, normocephalic. His conjunctivae were normal.  NECK:  His trachea is midline and supple. CHEST:  His lungs were clear anteriorly without any rhonchi, wheezing or  rales. CARDIOVASCULAR:  Heart was a regular rate and rhythm without any  murmurs. ABDOMEN:  Soft, nondistended, nontender. He had bowel sounds positive. He had no palpable organomegaly. He had no CVA tenderness. GENITOURINARY:  Revealed a normal circumcised male phallus. Polo  catheter emanating from the meatus. His testes were downgoing  bilaterally without masses. He had normal scrotum. He had no obvious  inguinal hernias. He is draining in his Polo bag a clear yellow urine  in it. EXTREMITIES:  Showed no cyanosis or edema. NEUROLOGIC:  He was alert and oriented. PSYCHIATRIC:  He had a normal affect. LABORATORY DATA:  He had a hemoglobin A1c from today of 9.4. He had a  lactic acid today of 1.2. He had a white count of 9.8, hematocrit of  42.1, and a platelet count of 352 from today. His creatinine from today  is 0.91. He had a COVID test from today which was negative. His  initial urinalysis was nitrite, leukocyte negative and blood negative.     I reviewed the MRI of the spine done today, findings which showed a 5 cm  well-circumcised inhomogeneous mass from the inferior pole of the left  kidney worrisome for renal cell carcinoma. I reviewed these findings  with the patient and his significant other in the room today which she  gave permission. IMPRESSION:  A 68-year-old male with a history of bipolar disease,  diabetes, hyperlipidemia, hypertension, stroke, who presents with  altered mental status and syncope, is being treated for pneumonia and  possible acute metabolic encephalopathy and was found on MRI of the  spine to have a left 5 cm suspicious renal mass. He also has urinary  retention. At this point, with respect to the renal mass, I recommended  dedicated CT urogram to fully assess the renal mass. Should this be a  solid and enhancing renal mass, he will likely need eventual tertiary  referral for management of this depending on the findings which we will  arrange through my office. With respect to his urinary retention, we  should leave Polo catheter for now, continue him on daily Flomax, a  voiding trial could be attempted prior to discharge. We will continue  to follow him through his stay here. All the questions were answered to the patient's apparent satisfaction  today. More than 50% of the visit was spent in face-to-face counseling  with respect to the finding on the MRI of the possible left renal mass.         Kimberly Garcia MD    D: 11/23/2020 16:43:23       T: 11/23/2020 16:48:01     [email or web address]  Job#: [id number]     Doc#: [id number]    CC:  Héctor Barragan MD

## 2020-11-24 NOTE — PROGRESS NOTES
[facility] OCCUPATIONAL THERAPY EVALUATION - ACUTE     NAME: Margy Vincent  : 1950 (65 y.o.)  MRN: [id number]  CODE STATUS: Full Code  Room: [place] [address]    Date of Service: 2020    Patient Diagnosis(es): Syncope and collapse [R55]  Community acquired pneumonia of both lower lobes [J18.9]   Chief Complaint   Patient presents with    Extremity Weakness     Patient Active Problem List    Diagnosis Date Noted    Neurogenic claudication due to lumbar spinal stenosis 2020    Hyperglycemia     Renal mass, left     Urinary retention     Spinal stenosis of lumbar region with neurogenic claudication     Weakness     Syncope and collapse 2020    Pneumonia 2020    Dementia ([address] 2020    Community acquired pneumonia of both lower lobes 2020    Bipolar 1 disorder ([address] 10/01/2015    Diabetes ([address]         Past Medical History:   Diagnosis Date    Bipolar 1 disorder ([address]     Cancer of skin of left ear     cured    Diabetes ([address]     [facility]     Hyperlipidemia     Hypertension     Stroke [facility])      Past Surgical History:   Procedure Laterality Date    AV FISTULA REPAIR      COLONOSCOPY  10/21/15    w/polypectomy     HERNIA REPAIR      TONSILLECTOMY      TRACHEOTOMY          Restrictions  Restrictions/Precautions: Fall Risk  Position Activity Restriction  Other position/activity restrictions: 1:1 in room     Safety Devices: Safety Devices  Safety Devices in place: Yes  Type of devices:  All fall risk precautions in place        Subjective  Pre Treatment Pain Screening  Pain at present: 0  Scale Used: Numeric Score    Pain Reassessment:   Pain Assessment  Patient Currently in Pain: No       Prior Level of Function:  Social/Functional History  Lives With: Spouse  Type of Home: House  Home Layout: Two level, Able to Live on Main level with bedroom/bathroom  Home Access: Stairs to enter with rails  Entrance Stairs - Number of Steps: Transfer: Unable to assess  Toilet Transfers Comments: anticipated SBA/CGA       Therapy key for assistance levels -   Independent = Pt. is able to perform task with no assistance but may require a device   Stand by assistance = Pt. does not perform task at an independent level but does not need physical assistance, requires verbal cues  Minimal, Moderate, Maximal Assistance = Pt. requires physical assistance (25%, 50%, 75% assist from helper) for task but is able to actively participate in task   Dependent = Pt. requires total assistance with task and is not able to actively participate with task completion     Functional Mobility:  Functional Mobility  Functional - Mobility Device: No device  Activity: Other(pt impulsive trying to walk to bathroom, pt with sway, VC's to redirect to bed)  Assist Level: Contact guard assistance  Functional Mobility Comments: per 1:1 pt not to walk to bathroom per physician d/t pt with elevated heart rate on previous attempt  Transfers  Sit to stand: Stand by assistance, Contact guard assistance  Stand to sit: Stand by assistance, Contact guard assistance    Bed Mobility  Bed mobility  Supine to Sit: Minimal assistance(tactile cues to bring B feet to EOB)  Sit to Supine: Stand by assistance    Seated and Standing Balance:  Balance  Sitting Balance: Supervision  Standing Balance: Contact guard assistance    Functional Endurance:  Activity Tolerance  Activity Tolerance: Treatment limited secondary to decreased cognition    D/C Recommendations:  OT D/C RECOMMENDATIONS  REQUIRES OT FOLLOW UP: Yes    Equipment Recommendations:  OT Equipment Recommendations  Other: Continue to assess    OT Education:   OT Education  OT Education: OT Role, Plan of Care  Barriers to Learning: decreased cognition    OT Follow Up:  OT D/C RECOMMENDATIONS  REQUIRES OT FOLLOW UP: Yes       Assessment/Discharge Disposition:  Assessment: Pt is a 65 y.o. male. Pt is poor historian with questionalbe PLOF.  Pt may benefit from OT services to address deficits and maximize safety and function during ADL's. Performance deficits / Impairments: Decreased functional mobility , Decreased ADL status, Decreased safe awareness, Decreased cognition, Decreased high-level IADLs, Decreased balance  Prognosis: Fair  Discharge Recommendations: Continue to assess pending progress  Decision Making: Medium Complexity  History: Multi comorb  Exam: 6 perf imp  Assistance / Modification: Min A    Six Click Score   How much help for putting on and taking off regular lower body clothing?: A Little  How much help for Bathing?: A Little  How much help for Toileting?: A Little  How much help for putting on and taking off regular upper body clothing?: A Little  How much help for taking care of personal grooming?: A Little  How much help for eating meals?: A Little  AM-PAC Inpatient Daily Activity Raw Score: 18  AM-PAC Inpatient ADL T-Scale Score : 38.66  ADL Inpatient CMS 0-100% Score: 46.65    Plan:  Plan  Times per week: 1-3  Plan weeks: LOS  Current Treatment Recommendations: Functional Mobility Training, Balance Training, Self-Care / ADL, Safety Education & Training, Equipment Evaluation, Education, & procurement, Patient/Caregiver Education & Training, Cognitive/Perceptual Training    Goals:   Patient will:    - Be Supervised in LB ADLs  - Be Supervised  in ADL transfers without LOB  - Access appropriate D/C site with as few architectural barriers as possible. - Sequence self-care tasks with one or less verbal cues     Patient Goal:    Not stated      Therapy Time:   OT Individual Minutes  Time In: 1554  Time Out: 1609  Minutes: 15    Eval: 15 minutes     Electronically signed by:     ARCHANA Lin  11/24/2020, 5:02 PM

## 2020-11-25 LAB
ALBUMIN SERPL-MCNC: 3.3 G/DL (ref 3.5–4.6)
ALP BLD-CCNC: 55 U/L (ref 35–104)
ALT SERPL-CCNC: 13 U/L (ref 0–41)
ANION GAP SERPL CALCULATED.3IONS-SCNC: 12 MEQ/L (ref 9–15)
AST SERPL-CCNC: 12 U/L (ref 0–40)
BASOPHILS ABSOLUTE: 0.1 K/UL (ref 0–0.2)
BASOPHILS RELATIVE PERCENT: 1.1 %
BILIRUB SERPL-MCNC: 0.7 MG/DL (ref 0.2–0.7)
BUN BLDV-MCNC: 16 MG/DL (ref 8–23)
CALCIUM SERPL-MCNC: 9.2 MG/DL (ref 8.5–9.9)
CHLORIDE BLD-SCNC: 102 MEQ/L (ref 95–107)
CO2: 20 MEQ/L (ref 20–31)
CREAT SERPL-MCNC: 0.74 MG/DL (ref 0.7–1.2)
EOSINOPHILS ABSOLUTE: 0.1 K/UL (ref 0–0.7)
EOSINOPHILS RELATIVE PERCENT: 1.1 %
GFR AFRICAN AMERICAN: >60
GFR NON-AFRICAN AMERICAN: >60
GLOBULIN: 3.5 G/DL (ref 2.3–3.5)
GLUCOSE BLD-MCNC: 310 MG/DL (ref 60–115)
GLUCOSE BLD-MCNC: 311 MG/DL (ref 60–115)
GLUCOSE BLD-MCNC: 364 MG/DL (ref 70–99)
GLUCOSE BLD-MCNC: 374 MG/DL (ref 60–115)
GLUCOSE BLD-MCNC: 404 MG/DL (ref 60–115)
HCT VFR BLD CALC: 41.5 % (ref 42–52)
HEMOGLOBIN: 13.9 G/DL (ref 14–18)
LYMPHOCYTES ABSOLUTE: 1.3 K/UL (ref 1–4.8)
LYMPHOCYTES RELATIVE PERCENT: 13.7 %
MCH RBC QN AUTO: 31.1 PG (ref 27–31.3)
MCHC RBC AUTO-ENTMCNC: 33.5 % (ref 33–37)
MCV RBC AUTO: 92.7 FL (ref 80–100)
MONOCYTES ABSOLUTE: 0.7 K/UL (ref 0.2–0.8)
MONOCYTES RELATIVE PERCENT: 7.2 %
NEUTROPHILS ABSOLUTE: 7.1 K/UL (ref 1.4–6.5)
NEUTROPHILS RELATIVE PERCENT: 76.9 %
PDW BLD-RTO: 13.5 % (ref 11.5–14.5)
PERFORMED ON: ABNORMAL
PLATELET # BLD: 277 K/UL (ref 130–400)
POTASSIUM REFLEX MAGNESIUM: 4.1 MEQ/L (ref 3.4–4.9)
PROCALCITONIN: 0.2 NG/ML (ref 0–0.15)
RBC # BLD: 4.48 M/UL (ref 4.7–6.1)
SODIUM BLD-SCNC: 134 MEQ/L (ref 135–144)
TOTAL PROTEIN: 6.8 G/DL (ref 6.3–8)
WBC # BLD: 9.3 K/UL (ref 4.8–10.8)

## 2020-11-25 PROCEDURE — 97116 GAIT TRAINING THERAPY: CPT

## 2020-11-25 PROCEDURE — 2060000000 HC ICU INTERMEDIATE R&B

## 2020-11-25 PROCEDURE — APPSS15 APP SPLIT SHARED TIME 0-15 MINUTES: Performed by: NURSE PRACTITIONER

## 2020-11-25 PROCEDURE — 2580000003 HC RX 258: Performed by: NURSE PRACTITIONER

## 2020-11-25 PROCEDURE — 6360000002 HC RX W HCPCS: Performed by: INTERNAL MEDICINE

## 2020-11-25 PROCEDURE — 84145 PROCALCITONIN (PCT): CPT

## 2020-11-25 PROCEDURE — 6370000000 HC RX 637 (ALT 250 FOR IP): Performed by: INTERNAL MEDICINE

## 2020-11-25 PROCEDURE — 36415 COLL VENOUS BLD VENIPUNCTURE: CPT

## 2020-11-25 PROCEDURE — 87040 BLOOD CULTURE FOR BACTERIA: CPT

## 2020-11-25 PROCEDURE — 99232 SBSQ HOSP IP/OBS MODERATE 35: CPT | Performed by: INTERNAL MEDICINE

## 2020-11-25 PROCEDURE — 97535 SELF CARE MNGMENT TRAINING: CPT

## 2020-11-25 PROCEDURE — 6370000000 HC RX 637 (ALT 250 FOR IP): Performed by: NURSE PRACTITIONER

## 2020-11-25 PROCEDURE — 6360000002 HC RX W HCPCS: Performed by: NURSE PRACTITIONER

## 2020-11-25 PROCEDURE — 80053 COMPREHEN METABOLIC PANEL: CPT

## 2020-11-25 PROCEDURE — 85025 COMPLETE CBC W/AUTO DIFF WBC: CPT

## 2020-11-25 PROCEDURE — 99233 SBSQ HOSP IP/OBS HIGH 50: CPT | Performed by: PSYCHIATRY & NEUROLOGY

## 2020-11-25 RX ORDER — METOPROLOL TARTRATE 50 MG/1
50 TABLET, FILM COATED ORAL EVERY 8 HOURS
Status: DISCONTINUED | OUTPATIENT
Start: 2020-11-25 | End: 2020-11-29 | Stop reason: HOSPADM

## 2020-11-25 RX ORDER — DIGOXIN 0.25 MG/ML
500 INJECTION INTRAMUSCULAR; INTRAVENOUS DAILY
Status: COMPLETED | OUTPATIENT
Start: 2020-11-25 | End: 2020-11-25

## 2020-11-25 RX ORDER — DIGOXIN 125 MCG
125 TABLET ORAL DAILY
Status: DISCONTINUED | OUTPATIENT
Start: 2020-11-26 | End: 2020-11-29 | Stop reason: HOSPADM

## 2020-11-25 RX ADMIN — RIVAROXABAN 20 MG: 20 TABLET, FILM COATED ORAL at 17:14

## 2020-11-25 RX ADMIN — METOPROLOL TARTRATE 25 MG: 25 TABLET, FILM COATED ORAL at 08:10

## 2020-11-25 RX ADMIN — TAMSULOSIN HYDROCHLORIDE 0.4 MG: 0.4 CAPSULE ORAL at 08:10

## 2020-11-25 RX ADMIN — ACETAMINOPHEN 650 MG: 325 TABLET ORAL at 11:53

## 2020-11-25 RX ADMIN — INSULIN LISPRO 4 UNITS: 100 INJECTION, SOLUTION INTRAVENOUS; SUBCUTANEOUS at 08:15

## 2020-11-25 RX ADMIN — Medication 10 ML: at 21:42

## 2020-11-25 RX ADMIN — DIGOXIN 500 MCG: 250 INJECTION, SOLUTION INTRAMUSCULAR; INTRAVENOUS; PARENTERAL at 11:53

## 2020-11-25 RX ADMIN — ASPIRIN 81 MG CHEWABLE TABLET 81 MG: 81 TABLET CHEWABLE at 08:10

## 2020-11-25 RX ADMIN — CEFTRIAXONE SODIUM 1 G: 1 INJECTION, POWDER, FOR SOLUTION INTRAMUSCULAR; INTRAVENOUS at 05:22

## 2020-11-25 RX ADMIN — INSULIN LISPRO 5 UNITS: 100 INJECTION, SOLUTION INTRAVENOUS; SUBCUTANEOUS at 11:53

## 2020-11-25 RX ADMIN — AZITHROMYCIN 500 MG: 500 INJECTION, POWDER, LYOPHILIZED, FOR SOLUTION INTRAVENOUS at 05:58

## 2020-11-25 RX ADMIN — INSULIN LISPRO 4 UNITS: 100 INJECTION, SOLUTION INTRAVENOUS; SUBCUTANEOUS at 17:14

## 2020-11-25 ASSESSMENT — ENCOUNTER SYMPTOMS
COUGH: 0
TROUBLE SWALLOWING: 0
VOMITING: 0
SHORTNESS OF BREATH: 0
CHEST TIGHTNESS: 0
WHEEZING: 0
NAUSEA: 0
COLOR CHANGE: 0

## 2020-11-25 ASSESSMENT — PAIN SCALES - GENERAL
PAINLEVEL_OUTOF10: 4
PAINLEVEL_OUTOF10: 0
PAINLEVEL_OUTOF10: 3

## 2020-11-25 ASSESSMENT — PAIN DESCRIPTION - LOCATION: LOCATION: HEAD

## 2020-11-25 ASSESSMENT — PAIN DESCRIPTION - DESCRIPTORS: DESCRIPTORS: HEADACHE

## 2020-11-25 ASSESSMENT — PAIN DESCRIPTION - PAIN TYPE: TYPE: ACUTE PAIN

## 2020-11-25 NOTE — CARE COORDINATION
[address]  EXT [address]. PT IS NOT SERVICE CONNECTED AND PCP IS DR Mandi Linares [phone]. EXT [id number]. CM SAME NUMBER KACY CHOU EXT [id number]. FAX FOR RX'S TO BE SENT TO [facility] [phone].

## 2020-11-25 NOTE — PROGRESS NOTES
[facility] Neurology Daily Progress Note  Name: Regino Major  Age: 65 y.o. Gender: male  CodeStatus: Full Code  Allergies: Pioglitazone    Chief Complaint:Extremity Weakness    Primary Care Provider: JERRICA Bautista CNP  InpatientTreatment Team: Treatment Team: Attending Provider: Wayne Torres DO; Consulting Physician: Lamont Ge MD; Utilization Reviewer: Juan Francisoc Muir RN; Consulting Physician: Smiley Rossi MD; Consulting Physician: Ines Gooden MD; : Estrellita Ramirez, MAYA; Consulting Physician: Doris Rodriguez DO; Consulting Physician: Larene Canavan, MD; Patient Care Tech: Isha Gamma; : Sarita Lackey, MAYA; Tech: Valarie Block; : BELEN Jones; Registered Nurse: Susie Verma RN  Admission Date: 11/21/2020      HPI   Pt seen and examined for neuro follow up for acute metabolic encephalopathy in the setting of pneumonia with concern for syncope versus basilar TIA. Patient with slight increasing confusion today. Alert and oriented x1-2. More somnolent. No seizure activity reported. No focal deficits. MRI of the brain done yesterday with no acute findings. Patient now back in normal sinus rhythm. Patient requires one-to-one supervision due to impulsiveness. Vitals:    11/25/20 1153   BP:    Pulse: 78   Resp:    Temp:    SpO2:       Review of Systems   Constitutional: Positive for fatigue. Negative for fever. HENT: Negative for hearing loss and trouble swallowing. Eyes: Negative for visual disturbance. Respiratory: Negative for cough, chest tightness, shortness of breath and wheezing. Cardiovascular: Negative for chest pain, palpitations and leg swelling. Gastrointestinal: Negative for nausea and vomiting. Skin: Negative for color change and rash. Neurological: Negative for dizziness, tremors, seizures, syncope, facial asymmetry, speech difficulty, weakness, light-headedness, numbness and headaches. Psychiatric/Behavioral: Positive for behavioral problems and confusion. Negative for agitation and hallucinations. The patient is not nervous/anxious. Physical Exam  Vitals signs and nursing note reviewed. Constitutional:       General: He is not in acute distress. Appearance: He is not ill-appearing or diaphoretic. HENT:      Head: Normocephalic and atraumatic. Eyes:      General: No visual field deficit. Extraocular Movements: Extraocular movements intact. Pupils: Pupils are equal, round, and reactive to light. Cardiovascular:      Rate and Rhythm: Normal rate and regular rhythm. Pulmonary:      Effort: Pulmonary effort is normal. No respiratory distress. Breath sounds: Normal breath sounds. Skin:     General: Skin is warm and dry. Neurological:      General: No focal deficit present. Mental Status: He is alert. He is disoriented. Cranial Nerves: No cranial nerve deficit, dysarthria or facial asymmetry. Motor: No weakness, tremor or seizure activity.                Medications:  Reviewed    Infusion Medications:    dilTIAZem (CARDIZEM) 125 mg in dextrose 5% 125 mL infusion Stopped (11/24/20 2015)     Scheduled Medications:    rivaroxaban  20 mg Oral Daily    metoprolol tartrate  50 mg Oral Q8H    [START ON 11/26/2020] digoxin  125 mcg Oral Daily    aspirin  81 mg Oral Daily    tamsulosin  0.4 mg Oral Daily    sodium chloride flush  10 mL Intravenous 2 times per day    azithromycin  500 mg Intravenous Q24H    And    cefTRIAXone (ROCEPHIN) IV  1 g Intravenous Q24H    insulin lispro  0-6 Units Subcutaneous TID WC    insulin lispro  0-3 Units Subcutaneous Nightly     PRN Meds: sodium chloride flush, acetaminophen **OR** acetaminophen, polyethylene glycol, promethazine **OR** ondansetron, ipratropium-albuterol    Labs:   Recent Labs     11/23/20  0506 11/24/20  0537 11/25/20  0623   WBC 9.8 7.8 9.3   HGB 14.3 14.2 13.9*   HCT 42.1 42.4 41.5*    [address] [facility]     11/23/20  0506 11/24/20  0537 11/25/20  0623    137 134*   K 4.0 4.1 4.1    105 102   CO2 24 22 20   BUN 11 11 16   CREATININE 0.91 0.73 0.74   CALCIUM 9.1 8.9 9.2     Recent Labs     11/23/20  0506 11/24/20  0537 11/25/20  0623   AST 12 13 12   ALT 12 14 13   BILITOT 0.9* 0.9* 0.7   ALKPHOS 47 54 55     No results for input(s): INR in the last 72 hours. Recent Labs     11/24/20  1008   TROPONINI <0.010       Urinalysis:   Lab Results   Component Value Date    NITRU Negative 11/21/2020    WBCUA 0-2 11/21/2020    BACTERIA Negative 11/21/2020    RBCUA 3-5 11/21/2020    BLOODU Negative 11/21/2020    SPECGRAV 1.024 11/21/2020    GLUCOSEU >=1000 11/21/2020       Radiology:   Most recent    EEG No procedure found. MRI of Brain No results found for this or any previous visit. Results for orders placed during the hospital encounter of 11/21/20   MRI BRAIN WO CONTRAST    Narrative EXAMINATION: MRI BRAIN WO CONTRAST     CLINICAL HISTORY:  encephalopathy, new onset afib     COMPARISONS: Brain MRI from August 12, 2012    TECHNIQUE:    Multiplanar multisequence images of the brain were obtained without contrast. Diffusion perfusion imaging was obtained. FINDINGS:   This study is partly limited due to motion artifact. There are no extra-axial collections. There is no evidence of hemorrhage. There are no areas of signal abnormality on perfusion diffusion imaging to suggest ischemia. The susceptibility images do not demonstrate evidence of hemosiderin deposition within   the brain parenchyma or the leptomeninges. There is preservation of the gray-white matter differentiation. There are no areas of signal abnormality within the brain parenchyma or the posterior fossa to suggest lesion. There is prominence of the sulci and ventricles consistent with moderate global   cerebral atrophy and chronic involutional changes.   The midline structures are intact, the corpus callosum is within normal limits. The region of the pineal gland and the sella turcica are unremarkable. There are no space-occupying lesions in the posterior fossa. The basilar cisterns are patent. The craniocervical junction is unremarkable. The visualized portions of the orbits are within normal limits, the globes are intact. The visualized portions of the paranasal sinuses are within normal limits. The calvarium and soft tissues are unremarkable. Impression There are no acute intracranial changes, no evidence of ischemia or hemorrhage. There are no regions of signal abnormality. MRA of the Head and Neck: No results found for this or any previous visit. No results found for this or any previous visit. No results found for this or any previous visit. CT of the Head:   Results for orders placed during the hospital encounter of 11/21/20   CT Head WO Contrast    Narrative CT Brain    Contrast medium:  Not utilized. History: Altered mental status    Comparison:  CT brain, August 10, 2012 MRI brain, August 12, 2012    Findings:    Extra-axial spaces:  Normal.     Intracranial hemorrhage:  None. Ventricular system: Ventricles mildly enlarged. Sulci mildly prominent. Basal Cisterns:  Normal.    Cerebral Parenchyma: Bilateral symmetric periventricular areas decreased attenuation. Midline Shift:  None. Cerebellum:  Normal.     Paranasal sinuses and mastoid air cells:  Normal.    Visualized Orbits:  Normal.        Impression Impression:    No acute findings. Mild cerebral atrophy. Chronic ischemic white matter disease. All CT scans at this facility use dose modulation, iterative reconstruction, and/or weight based dosing when appropriate to reduce radiation dose to as low as reasonably achievable. No results found for this or any previous visit. No results found for this or any previous visit.     Carotid duplex: No results found for this or any previous visit. No results found for this or any previous visit. Results for orders placed during the hospital encounter of 11/21/20   US CAROTID ARTERY BILATERAL    Narrative US CAROTID ARTERY BILATERAL: 11/22/2020    CLINICAL HISTORY:  stenosis . History of atherosclerotic disease. COMPARISON: 3/28/2008. Grayscale, color and waveform Doppler analysis of the cervical carotid and vertebral arteries was performed. Validated velocity measurements with angiographic measurements, velocity criteria are extrapolated from diameter data as defined by the Society of Radiologist in [address] [place] [address] Radiology 2003; 169;650-435. FINDINGS:    There is mild to moderate irregular atherosclerotic plaquing of the carotid bulbs without significantly elevated velocities, spectral waveform broadening, or incidental findings of concern identified. There is antegrade flow in both vertebral arteries. ARTERIAL BLOOD FLOW VELOCITY    RIGHT Peak Systolic                                                  Prox CCA:  116 cm/s               Mid CCA:  131 cm/s                Dist CCA:  1 cm/s                Prox ICA:  102 cm/s                 Mid ICA:  80 cm/s              Dist ICA:  70 cm/s               Prox ECA:  173 cm/s               Prox VERT:  66 cm/s                  ICA/CCA    0.70, which indicates less than 50% narrowing by velocity criteria. LEFT Peak Systolic    Prox CCA:  143 cm/s               Mid CCA:  117 cm/s                Dist CCA:  123 cm/s                Prox ICA:  107 cm/s                 Mid ICA:  86 cm/s              Dist ICA:  87 cm/s               Prox ECA:  83 cm/s               Prox VERT:  47 cm/s                     ICA/CCA     0.91, which indicates less than 50% narrowing by velocity criteria. Impression MILD TO MODERATE ATHEROSCLEROTIC PLAQUING OF THE CAROTID BULBS WITHOUT EVIDENCE OF A FLOW-LIMITING STENOSIS.                Echo No results found for this or any previous visit. Assessment/Plan:    11/22/20:  Presyncope or a basilar TIA this cannot be ruled out. Patient's leg weakness could be from lumbar canal stenosis.  The incontinence is a red herring.  Patient does have history of lumbar disc disease with lumbar canal stenosis seen at L3-L4.  This was diagnosed in 2012 am not quite sure if anything has been done for it.  I do not see a scar or any surgical intervention done and therefore patient require MRI of the lumbosacral spine. Steven Alba is not quite aware of any other history.     11/23/20:  Presyncope versus basilar TIA, start aspirin, check lipid panel and hemoglobin L1Z  Acute metabolic encephalopathy in the setting of pneumonia.  Currently on antibiotics.  Covid negative thus far. CT the head no acute findings, mild cerebral atrophy with chronic SVID  Carotid duplex pending  MRI of the lumbar spine pending  Given patient's presentation and risk factors for CVD and concern for TIA we will obtain MRI of brain as well     MRI lumbar spine shows severe stenosis l3-4,  Neurosurgery consult  Renal mass/  Urology on consult     11/24/20:  New onset atrial fibrillation, Cardizem drip with full dose oral anticoagulation  MRI of the lumbar spine showed severe L3-4 canal stenosis with neurogenic claudication and acute urinary retention. Patient to be followed on outpatient basis due to current medical comorbidities  MRI of brain seen by me officially not read there is no session of any strokes. Patient has not sustained any residuals of cerebrovascular events. Patient is a high risk for cerebrovascular disease given the atrial fibrillation    11/25/20:  Patient has been initiated on Xarelto for his new onset atrial fibrillation  Slightly more confused today  We will continue to monitor  Collaborating physicians: Dr Meg Bartholomew    I independently performed an evaluation on this patient. I have reviewed the above documentation completed by the Nurse Practitioner.  Please see my additional contributions to the HPI, physical exam, assessment/medical decision making.    .pael      Electronically signed by JERRICA Roblero CNP on 11/25/2020 at 3:06 PM

## 2020-11-25 NOTE — FLOWSHEET NOTE
1100  Assumed care of patient. Pt confused, pleasant. Pt resting quietly in bed at this time, wife and 1 on 1 at bedside for safety d/t impulsiveness. Pt skin warm, clammy. One touch is 374, temp 99.1. Per 1 on 1 pt c/o having a headache. Lungs clear, no SOB noted. Pt A-fib on monitor. Dr Mena Kraft aware and orders received, will medicate per orders. Peripheral pulses palpable, no peripheral edema noted. Pt resting in bed, will continue to monitor. 1150  Dr Violet Champagne notified of elevated temp. Orders received. Pt now back in NSR, heart rate in the 70's. Skin much more dry at this time. Pt medicated with 650mg PO tylenol for elevated temp. 1 on 1 at bedside. Will continue to monitor. 1500  Pt resting quietly in bed, no distress noted at this time. 1 on 1 remains at bedside. Pt remains NSR at this time. Will continue to monitor. 1700  Pt set up to eat dinner. Appetite good for dinner. Awake and alert, cooperative at this time. 1 on 1 remains at bedside. No requests/complaints will monitor.

## 2020-11-25 NOTE — PROGRESS NOTES
Progress Note    2020   9:10 AM    Name:  Anika Fang  MRN:    [id number]      Day: 3     Admit Date: 2020 10:50 PM  PCP: JERRICA Lee CNP    Code Status:  Full Code    Subjective:     Patient denies any new symptoms. No chest pain or dyspnea. Physical Examination:      Vitals:  /71   Pulse 92   Temp 99.9 °F (37.7 °C) (Oral)   Resp 18   Ht 6' (1.829 m)   Wt 212 lb (96.2 kg)   SpO2 97%   BMI 28.75 kg/m²   Temp (24hrs), Av.1 °F (37.3 °C), Min:98.5 °F (36.9 °C), Max:99.9 °F (37.7 °C)      General appearance: alert, cooperative and no distress  Mental Status: oriented to person, place and time and normal affect  Lungs: clear to auscultation bilaterally, normal effort  Heart: regular rate and rhythm, no murmur  Abdomen: soft, nontender, nondistended, bowel sounds present, no masses  Extremities: no edema, redness, tenderness in the calves  Skin: no gross lesions, rashes    Data:     Labs:  Recent Labs     20  0537 20  0623   WBC 7.8 9.3   HGB 14.2 13.9*    277     Recent Labs     20  0537 20  0623    134*   K 4.1 4.1    102   CO2 22 20   BUN 11 16   CREATININE 0.73 0.74   GLUCOSE 268* 364*     Recent Labs     20  0537 20  0623   AST 13 12   ALT 14 13   BILITOT 0.9* 0.7   ALKPHOS 54 55       Assessment and Plan:            Acute metabolic encephalopathy  Syncope  Fall  Possible pneumonia  L3-L4 spinal stenosis- severe   Urinary retention  Renal mass- concerning for RCC  New PAF- noted  during this admission     Plan  - MS is back to baseline   - converted to sinus. switch to xarelto. Dc lovenox. Added lopressor 25 bid. Cardiology on  - neuro is following. No CVA.    - NSGY seen for clifford stenosis, MRI noted, needs decompression as outpt  - renal mass possibly RCC, follow up with urology per Dr Arcadio Oliveros   - valentina to stay for retention until eval by urology outpt  - needs SNF          DISCHARGE PLANNING  Dc PT/OT, possible placement        Electronically signed by Margarette Read DO on 11/25/2020 at 9:10 AM

## 2020-11-25 NOTE — PROGRESS NOTES
Physical Therapy Med Surg Daily Treatment Note  Facility/Department: [facility] TELEMETRY  Room: [facility]/H693-20       NAME: Stephany Aldana  : 1950 (65 y.o.)  MRN: [id number]  CODE STATUS: Full Code    Date of Service: 2020    Patient Diagnosis(es): Syncope and collapse [R55]  Community acquired pneumonia of both lower lobes [J18.9]   Chief Complaint   Patient presents with    Extremity Weakness     Patient Active Problem List    Diagnosis Date Noted    Paroxysmal atrial fibrillation ([address]     Neurogenic claudication due to lumbar spinal stenosis 2020    Hyperglycemia     Renal mass, left     Urinary retention     Spinal stenosis of lumbar region with neurogenic claudication     Weakness     Syncope and collapse 2020    Pneumonia 2020    Dementia ([address] 2020    Community acquired pneumonia of both lower lobes 2020    Bipolar 1 disorder ([address] 10/01/2015    Diabetes ([address]         Past Medical History:   Diagnosis Date    Bipolar 1 disorder ([address]     Cancer of skin of left ear     cured    Diabetes ([address]     DM (diabetes mellitus) ([address]     Hyperlipidemia     Hypertension     Stroke [facility])      Past Surgical History:   Procedure Laterality Date    AV FISTULA REPAIR      COLONOSCOPY  10/21/15    w/polypectomy     HERNIA REPAIR      TONSILLECTOMY      TRACHEOTOMY            Restrictions  Restrictions/Precautions: Fall Risk  Position Activity Restriction  Other position/activity restrictions: 1:1 in room    SUBJECTIVE   Subjective  Subjective: Pt agreeable to tx. General Comment  Comments: Pt tends to keep eyes closed. Constant cueing for safety. Pt is alert but requires increased cueing for compliance.     Pre-Session Pain Report  Pre Treatment Pain Screening  Pain at present: 3  Intervention List: Patient able to continue with treatment          Post-Session Pain Report  Pain Assessment  Pain Assessment: 0-10  Pain Level: 3  Pain Type: Acute Therapy Time   Individual   Time In 1009   Time Out 1032   Minutes 23      bm/Trsf - 10 mins  Gait - 13 mins       Deward Seats, PTA, 11/25/20 at 10:42 AM       Definitions for assistance levels  Independent = pt does not require any physical supervision or assistance from another person for activity completion. Device may be needed.   Stand by assistance = pt requires verbal cues or instructions from another person, close to but not touching, to perform the activity  Minimal assistance= pt performs 75% or more of the activity; assistance is required to complete the activity  Moderate assistance= pt performs 50% of the activity; assistance is required to complete the activity  Maximal assistance = pt performs 25% of the activity; assistance is required to complete the activity  Dependent = pt requires total physical assistance to accomplish the task

## 2020-11-25 NOTE — FLOWSHEET NOTE
Josephine Pierre 281 care of pt from [facility]. Sitter at bedside for pt safety. 2005 PM assessment and medications completed. New bag of cardizem hung and infusing at 5mg/hr. 2015 Cardizem gtt paused for pt to go to MRI.  HR stable 82 and SR.

## 2020-11-25 NOTE — PROGRESS NOTES
Progress Note  Patient: Socorro Mosley  Unit/Bed: T364/H407-83  YOB: 1950  MRN: [id number]  Acct: [de-identified]   Admitting Diagnosis: Syncope and collapse [R55]  Community acquired pneumonia of both lower lobes [J18.9]  Admit Date:  11/21/2020  Hospital Day: 3    Chief Complaint: AF    Histories:  Past Medical History:   Diagnosis Date    Bipolar 1 disorder ([facility]     Cancer of skin of left ear     cured    Diabetes ([facility]     DM (diabetes mellitus) ([facility]     Hyperlipidemia     Hypertension     Stroke ([facility]      Past Surgical History:   Procedure Laterality Date    AV FISTULA REPAIR      COLONOSCOPY  10/21/15    w/polypectomy     HERNIA REPAIR      TONSILLECTOMY      TRACHEOTOMY       Family History   Problem Relation Age of Onset    Diabetes Mother     Heart Disease Mother     Stroke Father     Heart Disease Father     Cancer Brother     Stroke Sister      Social History     Socioeconomic History    Marital status:      Spouse name: None    Number of children: None    Years of education: None    Highest education level: None   Occupational History    None   Social Needs    Financial resource strain: None    Food insecurity     Worry: None     Inability: None    Transportation needs     Medical: None     Non-medical: None   Tobacco Use    Smoking status: Former Smoker    Smokeless tobacco: Never Used   Substance and Sexual Activity    Alcohol use: No     Alcohol/week: 0.0 standard drinks    Drug use: No    Sexual activity: None   Lifestyle    Physical activity     Days per week: None     Minutes per session: None    Stress: None   Relationships    Social connections     Talks on phone: None     Gets together: None     Attends Scientologist service: None     Active member of club or organization: None     Attends meetings of clubs or organizations: None     Relationship status: None    Intimate partner violence     Fear of current or ex partner: None Emotionally abused: None     Physically abused: None     Forced sexual activity: None   Other Topics Concern    None   Social History Narrative    None       Subjective/HPI pt is confused. Does not know where he is. EKG: converted to SR      Review of Systems:   Review of Systems  Not reliable     Physical Examination:    /71   Pulse 92   Temp 99.9 °F (37.7 °C) (Oral)   Resp 18   Ht 6' (1.829 m)   Wt 212 lb (96.2 kg)   SpO2 97%   BMI 28.75 kg/m²    Physical Exam   Constitutional: He appears healthy. No distress. HENT:   Normal cephalic and Atraumatic   Eyes: Pupils are equal, round, and reactive to light. Neck: Normal range of motion and thyroid normal. Neck supple. No JVD present. No neck adenopathy. No thyromegaly present. Cardiovascular: Normal rate, regular rhythm, intact distal pulses and normal pulses. Murmur heard. Pulmonary/Chest: Effort normal and breath sounds normal. He has no wheezes. He has no rales. He exhibits no tenderness. Abdominal: Soft. Bowel sounds are normal. There is no abdominal tenderness. Musculoskeletal: Normal range of motion. General: No tenderness or edema. Neurological: He is alert. He exhibits altered mental status. Skin: Skin is warm. No cyanosis. Nails show no clubbing.        LABS:  CBC:   Lab Results   Component Value Date    WBC 9.3 11/25/2020    RBC 4.48 11/25/2020    HGB 13.9 11/25/2020    HCT 41.5 11/25/2020    MCV 92.7 11/25/2020    MCH 31.1 11/25/2020    MCHC 33.5 11/25/2020    RDW 13.5 11/25/2020     11/25/2020    MPV 7.3 08/12/2012     CBC with Differential:    Lab Results   Component Value Date    WBC 9.3 11/25/2020    RBC 4.48 11/25/2020    HGB 13.9 11/25/2020    HCT 41.5 11/25/2020     11/25/2020    MCV 92.7 11/25/2020    MCH 31.1 11/25/2020    MCHC 33.5 11/25/2020    RDW 13.5 11/25/2020    LYMPHOPCT 13.7 11/25/2020    MONOPCT 7.2 11/25/2020    BASOPCT 1.1 11/25/2020    MONOSABS 0.7 11/25/2020    LYMPHSABS 1.3 11/25/2020    EOSABS 0.1 11/25/2020    BASOSABS 0.1 11/25/2020     CMP:    Lab Results   Component Value Date     11/25/2020    K 4.1 11/25/2020     11/25/2020    CO2 20 11/25/2020    BUN 16 11/25/2020    CREATININE 0.74 11/25/2020    GFRAA >60.0 11/25/2020    LABGLOM >60.0 11/25/2020    GLUCOSE 364 11/25/2020    PROT 6.8 11/25/2020    LABALBU 3.3 11/25/2020    CALCIUM 9.2 11/25/2020    BILITOT 0.7 11/25/2020    ALKPHOS 55 11/25/2020    AST 12 11/25/2020    ALT 13 11/25/2020     BMP:    Lab Results   Component Value Date     11/25/2020    K 4.1 11/25/2020     11/25/2020    CO2 20 11/25/2020    BUN 16 11/25/2020    LABALBU 3.3 11/25/2020    CREATININE 0.74 11/25/2020    CALCIUM 9.2 11/25/2020    GFRAA >60.0 11/25/2020    LABGLOM >60.0 11/25/2020    GLUCOSE 364 11/25/2020     Magnesium:    Lab Results   Component Value Date    MG 2.3 08/12/2012     Troponin:    Lab Results   Component Value Date    TROPONINI <0.010 11/24/2020        Active Hospital Problems    Diagnosis Date Noted    Neurogenic claudication due to lumbar spinal stenosis [M48.062] 11/24/2020     Priority: Low    Hyperglycemia [R73.9]      Priority: Low    Renal mass, left [N28.89]      Priority: Low    Urinary retention [R33.9]      Priority: Low    Spinal stenosis of lumbar region with neurogenic claudication [M48.062]      Priority: Low    Weakness [R53.1]      Priority: Low    Syncope and collapse [R55] 11/22/2020     Priority: Low    Pneumonia [J18.9] 11/22/2020     Priority: Low    Dementia ([facility] [F03.90] 11/22/2020     Priority: [address] acquired pneumonia of both lower lobes [J18.9] 11/22/2020     Priority: Low    Bipolar 1 disorder ([facility] [F31.9] 10/01/2015     Priority: Low    Diabetes ([facility] [E11.9]      Priority: Low        Assessment/Plan:  1. MS changes  2. Renal Mass  3. PAF (new) - converted to SR. Rate control. He is already on Xarelto.    4. Normal LVEF       Electronically signed by Alina Aiken MD KELI on 11/25/2020 at 8:04 AM

## 2020-11-26 LAB
ALBUMIN SERPL-MCNC: 3.2 G/DL (ref 3.5–4.6)
ALP BLD-CCNC: 51 U/L (ref 35–104)
ALT SERPL-CCNC: 10 U/L (ref 0–41)
ANION GAP SERPL CALCULATED.3IONS-SCNC: 11 MEQ/L (ref 9–15)
AST SERPL-CCNC: 10 U/L (ref 0–40)
BASOPHILS ABSOLUTE: 0 K/UL (ref 0–0.2)
BASOPHILS RELATIVE PERCENT: 0.6 %
BILIRUB SERPL-MCNC: 0.6 MG/DL (ref 0.2–0.7)
BUN BLDV-MCNC: 16 MG/DL (ref 8–23)
CALCIUM SERPL-MCNC: 9 MG/DL (ref 8.5–9.9)
CHLORIDE BLD-SCNC: 105 MEQ/L (ref 95–107)
CO2: 21 MEQ/L (ref 20–31)
CREAT SERPL-MCNC: 0.75 MG/DL (ref 0.7–1.2)
EOSINOPHILS ABSOLUTE: 0.2 K/UL (ref 0–0.7)
EOSINOPHILS RELATIVE PERCENT: 2.5 %
GFR AFRICAN AMERICAN: >60
GFR NON-AFRICAN AMERICAN: >60
GLOBULIN: 3.3 G/DL (ref 2.3–3.5)
GLUCOSE BLD-MCNC: 306 MG/DL (ref 60–115)
GLUCOSE BLD-MCNC: 333 MG/DL (ref 60–115)
GLUCOSE BLD-MCNC: 379 MG/DL (ref 70–99)
GLUCOSE BLD-MCNC: 385 MG/DL (ref 60–115)
GLUCOSE BLD-MCNC: 477 MG/DL (ref 60–115)
HCT VFR BLD CALC: 41.8 % (ref 42–52)
HEMOGLOBIN: 14 G/DL (ref 14–18)
LYMPHOCYTES ABSOLUTE: 1.2 K/UL (ref 1–4.8)
LYMPHOCYTES RELATIVE PERCENT: 15.7 %
MCH RBC QN AUTO: 30.8 PG (ref 27–31.3)
MCHC RBC AUTO-ENTMCNC: 33.6 % (ref 33–37)
MCV RBC AUTO: 91.7 FL (ref 80–100)
MONOCYTES ABSOLUTE: 0.5 K/UL (ref 0.2–0.8)
MONOCYTES RELATIVE PERCENT: 6.9 %
NEUTROPHILS ABSOLUTE: 5.6 K/UL (ref 1.4–6.5)
NEUTROPHILS RELATIVE PERCENT: 74.3 %
PDW BLD-RTO: 13.4 % (ref 11.5–14.5)
PERFORMED ON: ABNORMAL
PLATELET # BLD: 281 K/UL (ref 130–400)
POTASSIUM REFLEX MAGNESIUM: 4.5 MEQ/L (ref 3.4–4.9)
RBC # BLD: 4.56 M/UL (ref 4.7–6.1)
SODIUM BLD-SCNC: 137 MEQ/L (ref 135–144)
TOTAL PROTEIN: 6.5 G/DL (ref 6.3–8)
WBC # BLD: 7.6 K/UL (ref 4.8–10.8)

## 2020-11-26 PROCEDURE — 2580000003 HC RX 258: Performed by: NURSE PRACTITIONER

## 2020-11-26 PROCEDURE — 80053 COMPREHEN METABOLIC PANEL: CPT

## 2020-11-26 PROCEDURE — 6370000000 HC RX 637 (ALT 250 FOR IP): Performed by: NURSE PRACTITIONER

## 2020-11-26 PROCEDURE — 85025 COMPLETE CBC W/AUTO DIFF WBC: CPT

## 2020-11-26 PROCEDURE — 6360000002 HC RX W HCPCS: Performed by: NURSE PRACTITIONER

## 2020-11-26 PROCEDURE — 99231 SBSQ HOSP IP/OBS SF/LOW 25: CPT | Performed by: UROLOGY

## 2020-11-26 PROCEDURE — 6370000000 HC RX 637 (ALT 250 FOR IP): Performed by: INTERNAL MEDICINE

## 2020-11-26 PROCEDURE — 36415 COLL VENOUS BLD VENIPUNCTURE: CPT

## 2020-11-26 PROCEDURE — 99233 SBSQ HOSP IP/OBS HIGH 50: CPT | Performed by: PSYCHIATRY & NEUROLOGY

## 2020-11-26 PROCEDURE — 2060000000 HC ICU INTERMEDIATE R&B

## 2020-11-26 PROCEDURE — 99232 SBSQ HOSP IP/OBS MODERATE 35: CPT | Performed by: INTERNAL MEDICINE

## 2020-11-26 RX ORDER — MEMANTINE HYDROCHLORIDE 5 MG/1
5 TABLET ORAL 2 TIMES DAILY
COMMUNITY

## 2020-11-26 RX ORDER — TAMSULOSIN HYDROCHLORIDE 0.4 MG/1
0.4 CAPSULE ORAL DAILY
Qty: 30 CAPSULE | Refills: 3 | DISCHARGE
Start: 2020-11-27

## 2020-11-26 RX ORDER — GABAPENTIN 300 MG/1
300 CAPSULE ORAL 2 TIMES DAILY
COMMUNITY

## 2020-11-26 RX ORDER — ATORVASTATIN CALCIUM 20 MG/1
20 TABLET, FILM COATED ORAL DAILY
Status: ON HOLD | COMMUNITY
End: 2020-11-26 | Stop reason: HOSPADM

## 2020-11-26 RX ORDER — DIGOXIN 125 MCG
125 TABLET ORAL DAILY
Qty: 30 TABLET | Refills: 3 | Status: ON HOLD | DISCHARGE
Start: 2020-11-27 | End: 2020-12-14 | Stop reason: HOSPADM

## 2020-11-26 RX ORDER — TAMSULOSIN HYDROCHLORIDE 0.4 MG/1
0.4 CAPSULE ORAL DAILY
Status: ON HOLD | COMMUNITY
End: 2020-11-26 | Stop reason: HOSPADM

## 2020-11-26 RX ORDER — CEFUROXIME AXETIL 500 MG/1
500 TABLET ORAL 2 TIMES DAILY
Qty: 10 TABLET | Refills: 0 | DISCHARGE
Start: 2020-11-26 | End: 2020-12-01

## 2020-11-26 RX ORDER — GLIPIZIDE 10 MG/1
10 TABLET ORAL
Status: ON HOLD | COMMUNITY
End: 2020-11-26 | Stop reason: HOSPADM

## 2020-11-26 RX ORDER — METOPROLOL TARTRATE 50 MG/1
50 TABLET, FILM COATED ORAL EVERY 8 HOURS
Qty: 60 TABLET | Refills: 3 | Status: ON HOLD | DISCHARGE
Start: 2020-11-26 | End: 2020-12-14 | Stop reason: HOSPADM

## 2020-11-26 RX ORDER — GABAPENTIN 300 MG/1
300 CAPSULE ORAL 2 TIMES DAILY
Status: DISCONTINUED | OUTPATIENT
Start: 2020-11-26 | End: 2020-11-29 | Stop reason: HOSPADM

## 2020-11-26 RX ORDER — ALOGLIPTIN 25 MG/1
25 TABLET, FILM COATED ORAL DAILY
COMMUNITY

## 2020-11-26 RX ORDER — MEMANTINE HYDROCHLORIDE 5 MG/1
5 TABLET ORAL 2 TIMES DAILY
Status: DISCONTINUED | OUTPATIENT
Start: 2020-11-26 | End: 2020-11-29 | Stop reason: HOSPADM

## 2020-11-26 RX ADMIN — INSULIN LISPRO 4 UNITS: 100 INJECTION, SOLUTION INTRAVENOUS; SUBCUTANEOUS at 11:38

## 2020-11-26 RX ADMIN — METOPROLOL TARTRATE 50 MG: 50 TABLET, FILM COATED ORAL at 00:58

## 2020-11-26 RX ADMIN — DIGOXIN 125 MCG: 125 TABLET ORAL at 08:01

## 2020-11-26 RX ADMIN — GABAPENTIN 300 MG: 300 CAPSULE ORAL at 11:39

## 2020-11-26 RX ADMIN — MEMANTINE HYDROCHLORIDE 5 MG: 5 TABLET ORAL at 20:26

## 2020-11-26 RX ADMIN — METOPROLOL TARTRATE 50 MG: 50 TABLET, FILM COATED ORAL at 08:01

## 2020-11-26 RX ADMIN — METOPROLOL TARTRATE 50 MG: 50 TABLET, FILM COATED ORAL at 16:43

## 2020-11-26 RX ADMIN — RIVAROXABAN 20 MG: 20 TABLET, FILM COATED ORAL at 16:43

## 2020-11-26 RX ADMIN — TAMSULOSIN HYDROCHLORIDE 0.4 MG: 0.4 CAPSULE ORAL at 08:01

## 2020-11-26 RX ADMIN — AZITHROMYCIN 500 MG: 500 INJECTION, POWDER, LYOPHILIZED, FOR SOLUTION INTRAVENOUS at 05:24

## 2020-11-26 RX ADMIN — ASPIRIN 81 MG CHEWABLE TABLET 81 MG: 81 TABLET CHEWABLE at 08:00

## 2020-11-26 RX ADMIN — INSULIN LISPRO 6 UNITS: 100 INJECTION, SOLUTION INTRAVENOUS; SUBCUTANEOUS at 16:11

## 2020-11-26 RX ADMIN — CEFTRIAXONE SODIUM 1 G: 1 INJECTION, POWDER, FOR SOLUTION INTRAMUSCULAR; INTRAVENOUS at 04:42

## 2020-11-26 RX ADMIN — Medication 10 ML: at 20:26

## 2020-11-26 RX ADMIN — GABAPENTIN 300 MG: 300 CAPSULE ORAL at 20:26

## 2020-11-26 RX ADMIN — MEMANTINE HYDROCHLORIDE 5 MG: 5 TABLET ORAL at 11:39

## 2020-11-26 RX ADMIN — INSULIN LISPRO 4 UNITS: 100 INJECTION, SOLUTION INTRAVENOUS; SUBCUTANEOUS at 07:46

## 2020-11-26 ASSESSMENT — ENCOUNTER SYMPTOMS
TROUBLE SWALLOWING: 0
VOMITING: 0
WHEEZING: 0
BLOOD IN STOOL: 0
RESPIRATORY NEGATIVE: 1
COLOR CHANGE: 0
CHEST TIGHTNESS: 0
STRIDOR: 0
GASTROINTESTINAL NEGATIVE: 1
ABDOMINAL PAIN: 0
NAUSEA: 0
SHORTNESS OF BREATH: 0
EYES NEGATIVE: 1
COUGH: 0

## 2020-11-26 ASSESSMENT — PAIN DESCRIPTION - PAIN TYPE: TYPE: ACUTE PAIN;CHRONIC PAIN

## 2020-11-26 ASSESSMENT — PAIN SCALES - GENERAL
PAINLEVEL_OUTOF10: 0
PAINLEVEL_OUTOF10: 0

## 2020-11-26 NOTE — PROGRESS NOTES
[facility] Neurology Daily Progress Note  Name: Bridgette Reddy  Age: 65 y.o. Gender: male  CodeStatus: Full Code  Allergies: Pioglitazone    Chief Complaint:Extremity Weakness    Primary Care Provider: JERRICA Barron CNP  InpatientTreatment Team: Treatment Team: Attending Provider: Ruel Blake DO; Consulting Physician: Denia Davalos MD; Utilization Reviewer: Salma Celis RN; Consulting Physician: Mateo Jacobs MD; Consulting Physician: Dejan Thompson MD; Consulting Physician: Wesley Knowles DO; Consulting Physician: Sahara Maldonado MD; : Jaimie Diaz RN; : BELEN Flores; Patient Care Tech: Jaqui Shacklefords; : Yaquelin Ontiveros; LPN: Rose Marie Watters LPN; Registered Nurse: Surinder Cardenas RN  Admission Date: 11/21/2020      Extremity Weakness   Associated symptoms include fatigue. Pertinent negatives include no chest pain, coughing, fever, headaches, nausea, numbness, rash, vomiting or weakness. Pt seen and examined for neuro follow up for acute metabolic encephalopathy in the setting of pneumonia with concern for syncope versus basilar TIA. Patient with slight increasing confusion today. Alert and oriented x1-2. More somnolent. No seizure activity reported. No focal deficits. MRI of the brain done yesterday with no acute findings. Patient now back in normal sinus rhythm. Patient requires one-to-one supervision due to impulsiveness. Patient is not complaining of any symptoms though on discussion he has no recall of visit from urology and he has not been seen by neurosurgery it is likely that patient truly does have significant cognitive impairment. Review of Systems   Constitutional: Positive for fatigue. Negative for fever. HENT: Negative for hearing loss and trouble swallowing. Eyes: Negative for visual disturbance. Respiratory: Negative for cough, chest tightness, shortness of breath and wheezing.     Cardiovascular: Negative for chest pain, palpitations and leg swelling. Gastrointestinal: Negative for nausea and vomiting. Skin: Negative for color change and rash. Neurological: Negative for dizziness, tremors, seizures, syncope, facial asymmetry, speech difficulty, weakness, light-headedness, numbness and headaches. Psychiatric/Behavioral: Positive for behavioral problems and confusion. Negative for agitation and hallucinations. The patient is not nervous/anxious. Physical Exam  Vitals signs and nursing note reviewed. Constitutional:       General: He is not in acute distress. Appearance: He is not ill-appearing or diaphoretic. HENT:      Head: Normocephalic and atraumatic. Eyes:      General: No visual field deficit. Extraocular Movements: Extraocular movements intact. Pupils: Pupils are equal, round, and reactive to light. Cardiovascular:      Rate and Rhythm: Normal rate and regular rhythm. Pulmonary:      Effort: Pulmonary effort is normal. No respiratory distress. Breath sounds: Normal breath sounds. Skin:     General: Skin is warm and dry. Neurological:      General: No focal deficit present. Mental Status: He is alert. He is disoriented. Cranial Nerves: No cranial nerve deficit, dysarthria or facial asymmetry. Motor: No weakness, tremor or seizure activity.        /72   Pulse 77   Temp 99.6 °F (37.6 °C) (Oral)   Resp 20   Ht 6' (1.829 m)   Wt 197 lb 3.2 oz (89.4 kg)   SpO2 95%   BMI 26.75 kg/m²           Medications:  Reviewed    Infusion Medications:    dilTIAZem (CARDIZEM) 125 mg in dextrose 5% 125 mL infusion Stopped (11/24/20 2015)     Scheduled Medications:    gabapentin  300 mg Oral BID    memantine  5 mg Oral BID    rivaroxaban  20 mg Oral Daily    metoprolol tartrate  50 mg Oral Q8H    digoxin  125 mcg Oral Daily    aspirin  81 mg Oral Daily    tamsulosin  0.4 mg Oral Daily    sodium chloride flush  10 mL Intravenous 2 times per day    susceptibility images do not demonstrate evidence of hemosiderin deposition within   the brain parenchyma or the leptomeninges. There is preservation of the gray-white matter differentiation. There are no areas of signal abnormality within the brain parenchyma or the posterior fossa to suggest lesion. There is prominence of the sulci and ventricles consistent with moderate global   cerebral atrophy and chronic involutional changes. The midline structures are intact, the corpus callosum is within normal limits. The region of the pineal gland and the sella turcica are unremarkable. There are no space-occupying lesions in the posterior fossa. The basilar cisterns are patent. The craniocervical junction is unremarkable. The visualized portions of the orbits are within normal limits, the globes are intact. The visualized portions of the paranasal sinuses are within normal limits. The calvarium and soft tissues are unremarkable. Impression There are no acute intracranial changes, no evidence of ischemia or hemorrhage. There are no regions of signal abnormality. MRA of the Head and Neck: No results found for this or any previous visit. No results found for this or any previous visit. No results found for this or any previous visit. CT of the Head:   Results for orders placed during the hospital encounter of 11/21/20   CT Head WO Contrast    Narrative CT Brain    Contrast medium:  Not utilized. History: Altered mental status    Comparison:  CT brain, August 10, 2012 MRI brain, August 12, 2012    Findings:    Extra-axial spaces:  Normal.     Intracranial hemorrhage:  None. Ventricular system: Ventricles mildly enlarged. Sulci mildly prominent. Basal Cisterns:  Normal.    Cerebral Parenchyma: Bilateral symmetric periventricular areas decreased attenuation. Midline Shift:  None.     Cerebellum:  Normal.     Paranasal sinuses and mastoid air cells:  Normal.    Visualized Orbits:  Normal.        Impression Impression:    No acute findings. Mild cerebral atrophy. Chronic ischemic white matter disease. All CT scans at this facility use dose modulation, iterative reconstruction, and/or weight based dosing when appropriate to reduce radiation dose to as low as reasonably achievable. No results found for this or any previous visit. No results found for this or any previous visit. Carotid duplex: No results found for this or any previous visit. No results found for this or any previous visit. Results for orders placed during the hospital encounter of 11/21/20   US CAROTID ARTERY BILATERAL    Narrative US CAROTID ARTERY BILATERAL: 11/22/2020    CLINICAL HISTORY:  stenosis . History of atherosclerotic disease. COMPARISON: 3/28/2008. Grayscale, color and waveform Doppler analysis of the cervical carotid and vertebral arteries was performed. Validated velocity measurements with angiographic measurements, velocity criteria are extrapolated from diameter data as defined by the Society of Radiologist in [facility] 13 Radiology 2003; 704;170-944. FINDINGS:    There is mild to moderate irregular atherosclerotic plaquing of the carotid bulbs without significantly elevated velocities, spectral waveform broadening, or incidental findings of concern identified. There is antegrade flow in both vertebral arteries. ARTERIAL BLOOD FLOW VELOCITY    RIGHT Peak Systolic                                                  Prox CCA:  116 cm/s               Mid CCA:  131 cm/s                Dist CCA:  1 cm/s                Prox ICA:  102 cm/s                 Mid ICA:  80 cm/s              Dist ICA:  70 cm/s               Prox ECA:  173 cm/s               Prox VERT:  66 cm/s                  ICA/CCA    0.70, which indicates less than 50% narrowing by velocity criteria.       LEFT Peak Systolic    Prox CCA:  616 cm/s Mid CCA:  117 cm/s                Dist CCA:  123 cm/s                Prox ICA:  107 cm/s                 Mid ICA:  86 cm/s              Dist ICA:  87 cm/s               Prox ECA:  83 cm/s               Prox VERT:  47 cm/s                     ICA/CCA     0.91, which indicates less than 50% narrowing by velocity criteria. Impression MILD TO MODERATE ATHEROSCLEROTIC PLAQUING OF THE CAROTID BULBS WITHOUT EVIDENCE OF A FLOW-LIMITING STENOSIS. Echo No results found for this or any previous visit. Assessment/Plan:    11/22/20:  Presyncope or a basilar TIA this cannot be ruled out. Patient's leg weakness could be from lumbar canal stenosis.  The incontinence is a red herring.  Patient does have history of lumbar disc disease with lumbar canal stenosis seen at L3-L4.  This was diagnosed in 2012 am not quite sure if anything has been done for it.  I do not see a scar or any surgical intervention done and therefore patient require MRI of the lumbosacral spine. Chavo Schaefer is not quite aware of any other history.     11/23/20:  Presyncope versus basilar TIA, start aspirin, check lipid panel and hemoglobin J7T  Acute metabolic encephalopathy in the setting of pneumonia.  Currently on antibiotics.  Covid negative thus far. CT the head no acute findings, mild cerebral atrophy with chronic SVID  Carotid duplex pending  MRI of the lumbar spine pending  Given patient's presentation and risk factors for CVD and concern for TIA we will obtain MRI of brain as well     MRI lumbar spine shows severe stenosis l3-4,  Neurosurgery consult  Renal mass/  Urology on consult     11/24/20:  New onset atrial fibrillation, Cardizem drip with full dose oral anticoagulation  MRI of the lumbar spine showed severe L3-4 canal stenosis with neurogenic claudication and acute urinary retention.   Patient to be followed on outpatient basis due to current medical comorbidities  MRI of brain seen by me officially not read there is no session of any strokes. Patient has not sustained any residuals of cerebrovascular events. Patient is a high risk for cerebrovascular disease given the atrial fibrillation    11/25/20:  Patient has been initiated on Xarelto for his new onset atrial fibrillation  Slightly more confused today  We will continue to monitor  Collaborating physicians: Dr Edi Escobar    11/26  Patient is not complaining of any symptoms though in discussion patient has no recall of the discussion with Dr. Felicia Shipman and neurosurgery has not seen him yet for severe canal stenosis. It is likely that this patient has considerable cognitive impairment. There is some discrepancy regarding his medications from the [facility] and what we have here this is being sorted out.     Electronically signed by Benita Scott MD on 11/26/2020 at 11:03 AM

## 2020-11-26 NOTE — DISCHARGE SUMMARY
with normal S1/S2 without murmurs, rubs or gallops. Abdomen: Soft, non-tender, non-distended with normal bowel sounds. Musculoskeletal: No clubbing, cyanosis or edema bilaterally. Full range of motion without deformity. Skin: Skin color, texture, turgor normal.  No rashes or lesions. Neuro: Non Focal. Symetrical motor and tone. Nl Comprehension, Alert,awake and oriented. NL CN. Symetrical tone and reflexes. Psychiatric: Alert and oriented, thought content appropriate, normal insight  Capillary Refill: Brisk,< 3 seconds   Peripheral Pulses: +2 palpable, equal bilaterally     Patient was seen by the following consultants while admitted to [facility]:   Consults:  [address] TO CASE MANAGEMENT  IP CONSULT TO UROLOGY  IP CONSULT TO NEUROSURGERY  IP CONSULT TO CARDIOLOGY  IP CONSULT TO CARDIOLOGY    Significant Diagnostic Studies:    Refer to chart     Please refer to chart if no studies are shown here    Xr Chest (2 Vw)    Result Date: 11/22/2020  XR CHEST (2 VW) : 11/21/2020 CLINICAL HISTORY:  mental status change . COMPARISON: Portable chest 8/10/2012 and two-view chest 7/19/2005. TECHNIQUE: Upright AP and lateral radiographs of the chest were obtained. FINDINGS: A poor inspiratory volume is present with mild probable bibasilar atelectasis. Bronchopneumonia should be excluded clinically. There is no cardiomegaly, significant pleural effusion, vascular congestion, pneumothorax, or displaced fractures identified. POOR INSPIRATION WITH MILD PROBABLE BIBASILAR ATELECTASIS. BRONCHOPNEUMONIA SHOULD BE EXCLUDED CLINICALLY. Ct Head Wo Contrast    Result Date: 11/22/2020  CT Brain Contrast medium:  Not utilized. History: Altered mental status Comparison:  CT brain, August 10, 2012 MRI brain, August 12, 2012 Findings: Extra-axial spaces:  Normal. Intracranial hemorrhage:  None. Ventricular system: Ventricles mildly enlarged. Sulci mildly prominent.  Basal Cisterns: Normal. Cerebral Parenchyma: Bilateral symmetric periventricular areas decreased attenuation. Midline Shift:  None. Cerebellum:  Normal. Paranasal sinuses and mastoid air cells:  Normal. Visualized Orbits:  Normal.     Impression: No acute findings. Mild cerebral atrophy. Chronic ischemic white matter disease. All CT scans at this facility use dose modulation, iterative reconstruction, and/or weight based dosing when appropriate to reduce radiation dose to as low as reasonably achievable. Us Carotid Artery Bilateral    Result Date: 11/23/2020  US CAROTID ARTERY BILATERAL: 11/22/2020 CLINICAL HISTORY:  stenosis . History of atherosclerotic disease. COMPARISON: 3/28/2008. Grayscale, color and waveform Doppler analysis of the cervical carotid and vertebral arteries was performed. Validated velocity measurements with angiographic measurements, velocity criteria are extrapolated from diameter data as defined by the Society of Radiologist in [address] [place] [address] Drive Radiology 2003; [id number];[id number]. FINDINGS: There is mild to moderate irregular atherosclerotic plaquing of the carotid bulbs without significantly elevated velocities, spectral waveform broadening, or incidental findings of concern identified. There is antegrade flow in both vertebral arteries. ARTERIAL BLOOD FLOW VELOCITY RIGHT Peak Systolic                                              Prox CCA:  116 cm/s             Mid CCA:  131 cm/s              Dist CCA:  1 cm/s              Prox ICA:  102 cm/s               Mid ICA:  80 cm/s            Dist ICA:  70 cm/s             Prox ECA:  173 cm/s             Prox VERT:  66 cm/s              ICA/CCA    0.70, which indicates less than 50% narrowing by velocity criteria.  LEFT Peak Systolic Prox CCA:  790 cm/s             Mid CCA:  117 cm/s              Dist CCA:  123 cm/s              Prox ICA:  107 cm/s               Mid ICA:  86 cm/s            Dist ICA:  87 cm/s             Prox ECA:  83 cm/s             Prox

## 2020-11-26 NOTE — CARE COORDINATION
Rounds done earlier with nurse Jody and she was going to get 1:1 dcd today. They will use Avasys and see how patient does with that. Pt cont on IV Roceph and IV zith with nebs. Per SW notes plan is NH with family to decide on which one. Pt may not require precert now due to pandemic but not able to go as 1:1 and we still have to check where.

## 2020-11-26 NOTE — FLOWSHEET NOTE
Pt has been staying in bed resting all afternoon with avasys camera and no 1:1 in room. 1825 pt eating dinner. Calm and cooperative.

## 2020-11-26 NOTE — PROGRESS NOTES
Progress Note  Patient: Maegan De La Cruz  Unit/Bed: C738/G470-59  YOB: 1950  MRN: [id number]  Acct: [de-identified]   Admitting Diagnosis: Syncope and collapse [R55]  Community acquired pneumonia of both lower lobes [J18.9]  Admit Date:  11/21/2020  Hospital Day: 4    Chief Complaint: AF MS changes.      Histories:  Past Medical History:   Diagnosis Date    Bipolar 1 disorder ([facility]     Cancer of skin of left ear     cured    Diabetes ([facility]     DM (diabetes mellitus) ([facility]     Hyperlipidemia     Hypertension     Stroke [facility])      Past Surgical History:   Procedure Laterality Date    AV FISTULA REPAIR      COLONOSCOPY  10/21/15    w/polypectomy     HERNIA REPAIR      TONSILLECTOMY      TRACHEOTOMY       Family History   Problem Relation Age of Onset    Diabetes Mother     Heart Disease Mother     Stroke Father     Heart Disease Father     Cancer Brother     Stroke Sister      Social History     Socioeconomic History    Marital status:      Spouse name: None    Number of children: None    Years of education: None    Highest education level: None   Occupational History    None   Social Needs    Financial resource strain: None    Food insecurity     Worry: None     Inability: None    Transportation needs     Medical: None     Non-medical: None   Tobacco Use    Smoking status: Former Smoker    Smokeless tobacco: Never Used   Substance and Sexual Activity    Alcohol use: No     Alcohol/week: 0.0 standard drinks    Drug use: No    Sexual activity: None   Lifestyle    Physical activity     Days per week: None     Minutes per session: None    Stress: None   Relationships    Social connections     Talks on phone: None     Gets together: None     Attends Muslim service: None     Active member of club or organization: None     Attends meetings of clubs or organizations: None     Relationship status: None    Intimate partner violence     Fear of current or ex LABS:  CBC:   Lab Results   Component Value Date    WBC 7.6 11/26/2020    RBC 4.56 11/26/2020    HGB 14.0 11/26/2020    HCT 41.8 11/26/2020    MCV 91.7 11/26/2020    MCH 30.8 11/26/2020    MCHC 33.6 11/26/2020    RDW 13.4 11/26/2020     11/26/2020    MPV 7.3 08/12/2012     CBC with Differential:    Lab Results   Component Value Date    WBC 7.6 11/26/2020    RBC 4.56 11/26/2020    HGB 14.0 11/26/2020    HCT 41.8 11/26/2020     11/26/2020    MCV 91.7 11/26/2020    MCH 30.8 11/26/2020    MCHC 33.6 11/26/2020    RDW 13.4 11/26/2020    LYMPHOPCT 15.7 11/26/2020    MONOPCT 6.9 11/26/2020    BASOPCT 0.6 11/26/2020    MONOSABS 0.5 11/26/2020    LYMPHSABS 1.2 11/26/2020    EOSABS 0.2 11/26/2020    BASOSABS 0.0 11/26/2020     CMP:    Lab Results   Component Value Date     11/26/2020    K 4.5 11/26/2020     11/26/2020    CO2 21 11/26/2020    BUN 16 11/26/2020    CREATININE 0.75 11/26/2020    GFRAA >60.0 11/26/2020    LABGLOM >60.0 11/26/2020    GLUCOSE 379 11/26/2020    PROT 6.5 11/26/2020    LABALBU 3.2 11/26/2020    CALCIUM 9.0 11/26/2020    BILITOT 0.6 11/26/2020    ALKPHOS 51 11/26/2020    AST 10 11/26/2020    ALT 10 11/26/2020     BMP:    Lab Results   Component Value Date     11/26/2020    K 4.5 11/26/2020     11/26/2020    CO2 21 11/26/2020    BUN 16 11/26/2020    LABALBU 3.2 11/26/2020    CREATININE 0.75 11/26/2020    CALCIUM 9.0 11/26/2020    GFRAA >60.0 11/26/2020    LABGLOM >60.0 11/26/2020    GLUCOSE 379 11/26/2020     Magnesium:    Lab Results   Component Value Date    MG 2.3 08/12/2012     Troponin:    Lab Results   Component Value Date    TROPONINI <0.010 11/24/2020        Active Hospital Problems    Diagnosis Date Noted    Paroxysmal atrial fibrillation [facility]) [I48.0]      Priority: Low    Neurogenic claudication due to lumbar spinal stenosis [M48.062] 11/24/2020     Priority: Low    Hyperglycemia [R73.9]      Priority: Low    Renal mass, left [N28.89] Priority: Low    Urinary retention [R33.9]      Priority: Low    Spinal stenosis of lumbar region with neurogenic claudication [M48.062]      Priority: Low    Weakness [R53.1]      Priority: Low    Syncope and collapse [R55] 11/22/2020     Priority: Low    Pneumonia [J18.9] 11/22/2020     Priority: Low    Dementia ([facility] [F03.90] 11/22/2020     Priority: [address] acquired pneumonia of both lower lobes [J18.9] 11/22/2020     Priority: Low    Bipolar 1 disorder ([facility] [F31.9] 10/01/2015     Priority: Low    Diabetes ([facility] [E11.9]      Priority: Low        Assessment/Plan:  1. MS changes - resolved   2. Renal Mass   3. PAF (new) - converted to SR. Rate control. He is already on Xarelto.    4. Normal LVEF       Electronically signed by David Bernabe MD on 11/26/2020 at 8:00 AM

## 2020-11-26 NOTE — PROGRESS NOTES
Progress Note    11/26/2020   1:35 PM    Name:  Andrew Burrell  MRN:    [id number]     Tieralysjens:     [de-identified]   Room:  [address] Day: 4     Admit Date: 11/21/2020 10:50 PM  PCP: JERRICA Buck CNP    Subjective:     C/C:   Chief Complaint   Patient presents with    Extremity Weakness       Interval History: Status: This is a 64 yo male with DM, Bipolar Dz, HTN, CVA admitted for MS changes and syncope and found to have an incidental Lt renal mass on MRI of lumbar spine. He has no gross hematuria or flank pain. I again reviewed the results of the CT Urogram with the patient today that there is a suspicious 6 cm Lt LP renal mass. Past Medical History:   Diagnosis Date    [facility] ([facility]     Cancer of skin of left ear     cured    Diabetes ([facility]     DM (diabetes mellitus) ([facility]     Hyperlipidemia     Hypertension     Stroke ([facility]        ROS:  Review of Systems   Gastrointestinal: Negative for abdominal pain. Genitourinary: Negative for flank pain and hematuria. Medications: Allergies:    Allergies   Allergen Reactions    Pioglitazone        Current Meds: gabapentin (NEURONTIN) capsule 300 mg, BID  memantine (NAMENDA) tablet 5 mg, BID  rivaroxaban (XARELTO) tablet 20 mg, Daily  metoprolol tartrate (LOPRESSOR) tablet 50 mg, Q8H  digoxin (LANOXIN) tablet 125 mcg, Daily  dilTIAZem 125 mg in dextrose 5 % 125 mL infusion, Continuous  aspirin chewable tablet 81 mg, Daily  tamsulosin (FLOMAX) capsule 0.4 mg, Daily  sodium chloride flush 0.9 % injection 10 mL, 2 times per day  sodium chloride flush 0.9 % injection 10 mL, PRN  acetaminophen (TYLENOL) tablet 650 mg, Q6H PRN    Or  acetaminophen (TYLENOL) suppository 650 mg, Q6H PRN  polyethylene glycol (GLYCOLAX) packet 17 g, Daily PRN  promethazine (PHENERGAN) tablet 12.5 mg, Q6H PRN    Or  ondansetron (ZOFRAN) injection 4 mg, Q6H PRN  ipratropium-albuterol (DUONEB) nebulizer solution 1 ampule, Q4H PRN  azithromycin (ZITHROMAX) 500 mg in D5W 250ml addavial, Q24H    And  cefTRIAXone (ROCEPHIN) 1 g IVPB in 50 mL D5W minibag, Q24H  insulin lispro (HUMALOG) injection vial 0-6 Units, TID WC  insulin lispro (HUMALOG) injection vial 0-3 Units, Nightly        Data:     Code Status:  Full Code    Family History   Problem Relation Age of Onset    Diabetes Mother     Heart Disease Mother     Stroke Father     Heart Disease Father     Cancer Brother     Stroke Sister        Social History     Socioeconomic History    Marital status:      Spouse name: Not on file    Number of children: Not on file    Years of education: Not on file    Highest education level: Not on file   Occupational History    Not on file   Social Needs    Financial resource strain: Not on file    Food insecurity     Worry: Not on file     Inability: Not on file    Transportation needs     Medical: Not on file     Non-medical: Not on file   Tobacco Use    Smoking status: Former Smoker    Smokeless tobacco: Never Used   Substance and Sexual Activity    Alcohol use: No     Alcohol/week: 0.0 standard drinks    Drug use: No    Sexual activity: Not on file   Lifestyle    Physical activity     Days per week: Not on file     Minutes per session: Not on file    Stress: Not on file   Relationships    Social connections     Talks on phone: Not on file     Gets together: Not on file     Attends Oriental orthodox service: Not on file     Active member of club or organization: Not on file     Attends meetings of clubs or organizations: Not on file     Relationship status: Not on file    Intimate partner violence     Fear of current or ex partner: Not on file     Emotionally abused: Not on file     Physically abused: Not on file     Forced sexual activity: Not on file   Other Topics Concern    Not on file   Social History Narrative    Not on file       Vitals:  /72   Pulse 77   Temp 99.6 °F (37.6 °C) (Oral)   Resp 20   Ht 6' (1.829 m)   Wt 197 lb 3.2 oz (89.4 kg)   SpO2 95%   BMI 26.75 kg/m²   Temp (24hrs), Av.9 °F (37.2 °C), Min:98.1 °F (36.7 °C), Max:99.6 °F (37.6 °C)    Recent Labs     20  1555 20  1950 20  0613 20  1125   POCGLU 311* 404* 333* 306*       I/O(24Hr): Intake/Output Summary (Last 24 hours) at 2020 1335  Last data filed at 2020 1309  Gross per 24 hour   Intake 2195 ml   Output 3225 ml   Net -1030 ml       Labs:  Hematology:  Recent Labs     20  0614   WBC 7.6   HGB 14.0   HCT 41.8*        Chemistry:  Recent Labs     20  1008  20  0614   NA  --    < > 137   K  --    < > 4.5   CL  --    < > 105   CO2  --    < > 21   GLUCOSE  --    < > 379*   BUN  --    < > 16   CREATININE  --    < > 0.75   ANIONGAP  --    < > 11   LABGLOM  --    < > >60.0   GFRAA  --    < > >60.0   CALCIUM  --    < > 9.0   TROPONINI <0.010  --   --     < > = values in this interval not displayed. Urine CultureNo results found for: LABURIN      Physical Examination:        Physical Exam  Abdominal:      General: There is no distension. Palpations: Abdomen is soft. Tenderness: There is no abdominal tenderness. There is no guarding. Genitourinary:     Comments: Urine is yellow in Polo bag  Neurological:      Mental Status: He is alert. Assessment:        Primary Problem  This is a 66 yo male with DM, Bipolar Dz, HTN, CVA admitted for MS changes and syncope and with incidental Lt renal mass confirmed on recent CT Urogram and Renal U/S. The management for this will be arranged as an outpatient once his acute medical issues have resolved. The appointment has been made for F/U by my office.        Pneumonia     Active Hospital Problems    Diagnosis Date Noted    Paroxysmal atrial fibrillation ([facility]) [I48.0]     Neurogenic claudication due to lumbar spinal stenosis [M48.062] 2020    Hyperglycemia [R73.9]     Renal mass, left [N28.89]     Urinary retention [R33.9]     Spinal stenosis of lumbar region with neurogenic claudication [M48.062]     Weakness [R53.1]     Syncope and collapse [R55] 11/22/2020    Pneumonia [J18.9] 11/22/2020    Dementia ([facility] [F03.90] 11/22/2020    Community acquired pneumonia of both lower lobes [J18.9] 11/22/2020    Bipolar 1 disorder ([facility] [F31.9] 10/01/2015    Diabetes ([facility] [E11.9]          Plan:        1.  [facility] for D/C to SNF with Melani and has F/U already arranged with me      Electronically signed by Carlie Gonzalez MD on 11/26/2020 at 1:35 PM

## 2020-11-27 LAB
ALBUMIN SERPL-MCNC: 3.2 G/DL (ref 3.5–4.6)
ALP BLD-CCNC: 60 U/L (ref 35–104)
ALT SERPL-CCNC: 13 U/L (ref 0–41)
ANION GAP SERPL CALCULATED.3IONS-SCNC: 13 MEQ/L (ref 9–15)
AST SERPL-CCNC: 11 U/L (ref 0–40)
BASOPHILS ABSOLUTE: 0.1 K/UL (ref 0–0.2)
BASOPHILS RELATIVE PERCENT: 0.9 %
BILIRUB SERPL-MCNC: 0.6 MG/DL (ref 0.2–0.7)
BLOOD CULTURE, ROUTINE: NORMAL
BUN BLDV-MCNC: 15 MG/DL (ref 8–23)
CALCIUM SERPL-MCNC: 9.1 MG/DL (ref 8.5–9.9)
CHLORIDE BLD-SCNC: 99 MEQ/L (ref 95–107)
CO2: 20 MEQ/L (ref 20–31)
CREAT SERPL-MCNC: 0.7 MG/DL (ref 0.7–1.2)
CULTURE, BLOOD 2: NORMAL
EOSINOPHILS ABSOLUTE: 0.1 K/UL (ref 0–0.7)
EOSINOPHILS RELATIVE PERCENT: 1.1 %
GFR AFRICAN AMERICAN: >60
GFR NON-AFRICAN AMERICAN: >60
GLOBULIN: 3.7 G/DL (ref 2.3–3.5)
GLUCOSE BLD-MCNC: 308 MG/DL (ref 60–115)
GLUCOSE BLD-MCNC: 344 MG/DL (ref 60–115)
GLUCOSE BLD-MCNC: 350 MG/DL (ref 60–115)
GLUCOSE BLD-MCNC: 391 MG/DL (ref 70–99)
GLUCOSE BLD-MCNC: 452 MG/DL (ref 60–115)
HCT VFR BLD CALC: 44.9 % (ref 42–52)
HEMOGLOBIN: 15.5 G/DL (ref 14–18)
LYMPHOCYTES ABSOLUTE: 1 K/UL (ref 1–4.8)
LYMPHOCYTES RELATIVE PERCENT: 10.9 %
MCH RBC QN AUTO: 31.8 PG (ref 27–31.3)
MCHC RBC AUTO-ENTMCNC: 34.6 % (ref 33–37)
MCV RBC AUTO: 91.7 FL (ref 80–100)
MONOCYTES ABSOLUTE: 0.6 K/UL (ref 0.2–0.8)
MONOCYTES RELATIVE PERCENT: 6.2 %
NEUTROPHILS ABSOLUTE: 7.5 K/UL (ref 1.4–6.5)
NEUTROPHILS RELATIVE PERCENT: 80.9 %
PDW BLD-RTO: 13.6 % (ref 11.5–14.5)
PERFORMED ON: ABNORMAL
PLATELET # BLD: 324 K/UL (ref 130–400)
POTASSIUM REFLEX MAGNESIUM: 4.3 MEQ/L (ref 3.4–4.9)
RBC # BLD: 4.89 M/UL (ref 4.7–6.1)
SODIUM BLD-SCNC: 132 MEQ/L (ref 135–144)
TOTAL PROTEIN: 6.9 G/DL (ref 6.3–8)
WBC # BLD: 9.3 K/UL (ref 4.8–10.8)

## 2020-11-27 PROCEDURE — 6370000000 HC RX 637 (ALT 250 FOR IP): Performed by: INTERNAL MEDICINE

## 2020-11-27 PROCEDURE — 6360000002 HC RX W HCPCS: Performed by: NURSE PRACTITIONER

## 2020-11-27 PROCEDURE — 2580000003 HC RX 258: Performed by: NURSE PRACTITIONER

## 2020-11-27 PROCEDURE — 97535 SELF CARE MNGMENT TRAINING: CPT

## 2020-11-27 PROCEDURE — 36415 COLL VENOUS BLD VENIPUNCTURE: CPT

## 2020-11-27 PROCEDURE — 85025 COMPLETE CBC W/AUTO DIFF WBC: CPT

## 2020-11-27 PROCEDURE — 2060000000 HC ICU INTERMEDIATE R&B

## 2020-11-27 PROCEDURE — 6370000000 HC RX 637 (ALT 250 FOR IP): Performed by: NURSE PRACTITIONER

## 2020-11-27 PROCEDURE — 80053 COMPREHEN METABOLIC PANEL: CPT

## 2020-11-27 PROCEDURE — 99232 SBSQ HOSP IP/OBS MODERATE 35: CPT | Performed by: INTERNAL MEDICINE

## 2020-11-27 RX ADMIN — METOPROLOL TARTRATE 50 MG: 50 TABLET, FILM COATED ORAL at 00:02

## 2020-11-27 RX ADMIN — CEFTRIAXONE SODIUM 1 G: 1 INJECTION, POWDER, FOR SOLUTION INTRAMUSCULAR; INTRAVENOUS at 04:46

## 2020-11-27 RX ADMIN — ASPIRIN 81 MG CHEWABLE TABLET 81 MG: 81 TABLET CHEWABLE at 08:15

## 2020-11-27 RX ADMIN — INSULIN LISPRO 4 UNITS: 100 INJECTION, SOLUTION INTRAVENOUS; SUBCUTANEOUS at 15:38

## 2020-11-27 RX ADMIN — INSULIN LISPRO 6 UNITS: 100 INJECTION, SOLUTION INTRAVENOUS; SUBCUTANEOUS at 11:07

## 2020-11-27 RX ADMIN — MEMANTINE HYDROCHLORIDE 5 MG: 5 TABLET ORAL at 08:15

## 2020-11-27 RX ADMIN — Medication 10 ML: at 08:16

## 2020-11-27 RX ADMIN — GABAPENTIN 300 MG: 300 CAPSULE ORAL at 15:38

## 2020-11-27 RX ADMIN — TAMSULOSIN HYDROCHLORIDE 0.4 MG: 0.4 CAPSULE ORAL at 08:15

## 2020-11-27 RX ADMIN — GABAPENTIN 300 MG: 300 CAPSULE ORAL at 08:15

## 2020-11-27 RX ADMIN — MEMANTINE HYDROCHLORIDE 5 MG: 5 TABLET ORAL at 20:51

## 2020-11-27 RX ADMIN — DIGOXIN 125 MCG: 125 TABLET ORAL at 08:15

## 2020-11-27 RX ADMIN — AZITHROMYCIN 500 MG: 500 INJECTION, POWDER, LYOPHILIZED, FOR SOLUTION INTRAVENOUS at 04:46

## 2020-11-27 RX ADMIN — RIVAROXABAN 20 MG: 20 TABLET, FILM COATED ORAL at 17:00

## 2020-11-27 RX ADMIN — METOPROLOL TARTRATE 50 MG: 50 TABLET, FILM COATED ORAL at 15:38

## 2020-11-27 RX ADMIN — METOPROLOL TARTRATE 50 MG: 50 TABLET, FILM COATED ORAL at 23:30

## 2020-11-27 RX ADMIN — METOPROLOL TARTRATE 50 MG: 50 TABLET, FILM COATED ORAL at 08:15

## 2020-11-27 RX ADMIN — Medication 10 ML: at 20:51

## 2020-11-27 RX ADMIN — INSULIN LISPRO 6 UNITS: 100 INJECTION, SOLUTION INTRAVENOUS; SUBCUTANEOUS at 09:10

## 2020-11-27 ASSESSMENT — ENCOUNTER SYMPTOMS
SHORTNESS OF BREATH: 0
BLOOD IN STOOL: 0
COUGH: 0
EYES NEGATIVE: 1
WHEEZING: 0
COLOR CHANGE: 0
STRIDOR: 0
CHEST TIGHTNESS: 0
RESPIRATORY NEGATIVE: 1
BACK PAIN: 1
VOMITING: 0
TROUBLE SWALLOWING: 0
GASTROINTESTINAL NEGATIVE: 1
NAUSEA: 0

## 2020-11-27 ASSESSMENT — PAIN DESCRIPTION - PAIN TYPE
TYPE: ACUTE PAIN;CHRONIC PAIN

## 2020-11-27 ASSESSMENT — PAIN DESCRIPTION - LOCATION
LOCATION: GENERALIZED

## 2020-11-27 ASSESSMENT — PAIN SCALES - GENERAL
PAINLEVEL_OUTOF10: 0

## 2020-11-27 ASSESSMENT — PAIN DESCRIPTION - DESCRIPTORS
DESCRIPTORS: DULL
DESCRIPTORS: HEADACHE
DESCRIPTORS: HEADACHE
DESCRIPTORS: DULL;DISCOMFORT
DESCRIPTORS: HEADACHE

## 2020-11-27 NOTE — PROGRESS NOTES
partner: None     Emotionally abused: None     Physically abused: None     Forced sexual activity: None   Other Topics Concern    None   Social History Narrative    None       Subjective/HPI AOx3. No cp no sob ate well. Sitting in chair. EKG: SR 87      Review of Systems:   Review of Systems   Constitutional: Negative. Negative for diaphoresis and fatigue. HENT: Negative. Eyes: Negative. Respiratory: Negative. Negative for cough, chest tightness, shortness of breath, wheezing and stridor. Cardiovascular: Negative. Negative for chest pain, palpitations and leg swelling. Gastrointestinal: Negative. Negative for blood in stool and nausea. Genitourinary: Negative. Musculoskeletal: Negative. Skin: Negative. Neurological: Negative. Negative for dizziness, syncope, weakness and light-headedness. Hematological: Negative. Psychiatric/Behavioral: Negative. Physical Examination:    BP (!) 143/82   Pulse 81   Temp 98.4 °F (36.9 °C) (Oral)   Resp 18   Ht 6' (1.829 m)   Wt 197 lb 3.2 oz (89.4 kg)   SpO2 95%   BMI 26.75 kg/m²    Physical Exam   Constitutional: He appears healthy. No distress. HENT:   Normal cephalic and Atraumatic   Eyes: Pupils are equal, round, and reactive to light. Neck: Normal range of motion and thyroid normal. Neck supple. No JVD present. No neck adenopathy. No thyromegaly present. Cardiovascular: Normal rate, regular rhythm, intact distal pulses and normal pulses. Murmur heard. Pulmonary/Chest: Effort normal and breath sounds normal. He has no wheezes. He has no rales. He exhibits no tenderness. Abdominal: Soft. Bowel sounds are normal. There is no abdominal tenderness. Musculoskeletal: Normal range of motion. General: No tenderness or edema. Neurological: He is alert. He exhibits altered mental status. Skin: Skin is warm. No cyanosis. Nails show no clubbing.        LABS:  CBC:   Lab Results   Component Value Date    WBC 9.3 11/27/2020    RBC 4.89 11/27/2020    HGB 15.5 11/27/2020    HCT 44.9 11/27/2020    MCV 91.7 11/27/2020    MCH 31.8 11/27/2020    MCHC 34.6 11/27/2020    RDW 13.6 11/27/2020     11/27/2020    MPV 7.3 08/12/2012     CBC with Differential:    Lab Results   Component Value Date    WBC 9.3 11/27/2020    RBC 4.89 11/27/2020    HGB 15.5 11/27/2020    HCT 44.9 11/27/2020     11/27/2020    MCV 91.7 11/27/2020    MCH 31.8 11/27/2020    MCHC 34.6 11/27/2020    RDW 13.6 11/27/2020    LYMPHOPCT 10.9 11/27/2020    MONOPCT 6.2 11/27/2020    BASOPCT 0.9 11/27/2020    MONOSABS 0.6 11/27/2020    LYMPHSABS 1.0 11/27/2020    EOSABS 0.1 11/27/2020    BASOSABS 0.1 11/27/2020     CMP:    Lab Results   Component Value Date     11/27/2020    K 4.3 11/27/2020    CL 99 11/27/2020    CO2 20 11/27/2020    BUN 15 11/27/2020    CREATININE 0.70 11/27/2020    GFRAA >60.0 11/27/2020    LABGLOM >60.0 11/27/2020    GLUCOSE 391 11/27/2020    PROT 6.9 11/27/2020    LABALBU 3.2 11/27/2020    CALCIUM 9.1 11/27/2020    BILITOT 0.6 11/27/2020    ALKPHOS 60 11/27/2020    AST 11 11/27/2020    ALT 13 11/27/2020     BMP:    Lab Results   Component Value Date     11/27/2020    K 4.3 11/27/2020    CL 99 11/27/2020    CO2 20 11/27/2020    BUN 15 11/27/2020    LABALBU 3.2 11/27/2020    CREATININE 0.70 11/27/2020    CALCIUM 9.1 11/27/2020    GFRAA >60.0 11/27/2020    LABGLOM >60.0 11/27/2020    GLUCOSE 391 11/27/2020     Magnesium:    Lab Results   Component Value Date    MG 2.3 08/12/2012     Troponin:    Lab Results   Component Value Date    TROPONINI <0.010 11/24/2020        Active Hospital Problems    Diagnosis Date Noted    Paroxysmal atrial fibrillation [facility]) [I48.0]      Priority: Low    Neurogenic claudication due to lumbar spinal stenosis [M48.062] 11/24/2020     Priority: Low    Hyperglycemia [R73.9]      Priority: Low    Renal mass, left [N28.89]      Priority: Low    Urinary retention [R33.9]      Priority: Low    Spinal stenosis of lumbar region with neurogenic claudication [M48.062]      Priority: Low    Weakness [R53.1]      Priority: Low    Syncope and collapse [R55] 11/22/2020     Priority: Low    Pneumonia [J18.9] 11/22/2020     Priority: Low    Dementia ([facility] [F03.90] 11/22/2020     Priority: [address] acquired pneumonia of both lower lobes [J18.9] 11/22/2020     Priority: Low    Bipolar 1 disorder ([facility] [F31.9] 10/01/2015     Priority: Low    Diabetes ([facility] [E11.9]      Priority: Low        Assessment/Plan:  1. MS changes - resolved   2. Renal Mass - out pt w/u planned  3. PAF (new) - converted to SR. Rate control. He is already on Xarelto. 4. Normal LVEF  5.  Precert for SNF       Electronically signed by Janeth Dye MD on 11/27/2020 at 11:15 AM

## 2020-11-27 NOTE — CARE COORDINATION
Per the RN, the patient's daughter was here earlier today and took the SNF list. The LSW left a message for the patient's wife regarding the discharge plan. Electronically signed by Chandra Davis on 11/27/20 at 12:11 PM EST    The LSW called and talked to the patient's wife, Susy Platt. Susy Pltat states she is not sure of the discharge plan. The LSW discussed she could call the patient's daughter's phone number regarding the discharge plan. The patient's wife states she will check her phone for the patient's daughter's [employer] cell phone number. The patient's wife asked the LSW to call her back in a few minutes.  updated. Electronically signed by Chandra Davis on 11/27/20 at 2:17 PM EST    The LSW called and talked to the patient's wife, Susy Platt. Susy Platt states the patient's daughter's [employer] phone number is [phone]. The LSW called and talked to the patient's daughter, Alona Romero. Alona Romero states the patient's wife gets confused at times. Alona Romero states the discharge plan is 1. Ernesto An of [address] , admissions notified and reviewing the referral 2. [facility], notified and reviewing referral. Yahaira Bejarano, admissions at [facility], states she will need to check the insurance and some [employer] Medicare's are still requiring a precert. The patient will need a negative covid test result. Electronically signed by BELEN Bingham on 11/27/20 at 2:49 PM EST    The LSW talked to Ernesto James of [place]. Yahaira Bejarano called and talked to the patient's daughter, Alona Romero. Katya Mckay is still reviewing the referral and will submit to nursing for review. Per Anusha Kasper, states they will not have a bed until Monday. [facility] Franco has covid in the building now and the family would need updated. The LSW updated the  on call [facility]) that the patient may need a [id number] if discharged over the weekend and if [facility] accepts the patient.  ([facility] Alter has no bed until Monday.) Electronically signed by Edvin Keene on 11/27/20 at 4:33 PM EST

## 2020-11-27 NOTE — PROGRESS NOTES
[facility] Neurology Daily Progress Note  Name: Ana Garcia  Age: 65 y.o. Gender: male  CodeStatus: Full Code  Allergies: Pioglitazone    Chief Complaint:Extremity Weakness    Primary Care Provider: JERRICA Uriarte CNP  InpatientTreatment Team: Treatment Team: Attending Provider: Scar Grant DO; Consulting Physician: Anton Alicia MD; Utilization Reviewer: Fe Mauricio, RN; Consulting Physician: Arturo Fowler MD; Consulting Physician: Luanne Wilder MD; Consulting Physician: Alesia Cheema DO; Consulting Physician: Wilbur Price MD; : Danny Conde, RN; Registered Nurse: Ellyn Martinez, RN; Utilization Reviewer: Austin Parham RN; : BELEN Medley  Admission Date: 11/21/2020      Extremity Weakness   Associated symptoms include fatigue and weakness. Pertinent negatives include no chest pain, coughing, fever, headaches, nausea, numbness, rash or vomiting. Pt seen and examined for neuro follow up for acute metabolic encephalopathy in the setting of pneumonia with concern for syncope versus basilar TIA. Patient with slight increasing confusion today. Alert and oriented x1-2. No seizure activity reported. No focal deficits. MRI of the brain done yesterday with no acute findings. Patient now back in normal sinus rhythm. Patient requires one-to-one supervision due to impulsiveness. Patient is not complaining of any symptoms though on discussion he has no recall of visit from urology and he has not been seen by neurosurgery it is likely that patient truly does have significant cognitive impairment. Patient still remains intermittently disoriented and likely appears to be his baseline     Review of Systems   Constitutional: Positive for fatigue. Negative for fever. HENT: Negative for hearing loss and trouble swallowing. Eyes: Negative for visual disturbance. Respiratory: Negative for cough, chest tightness, shortness of breath and wheezing. Cardiovascular: Negative for chest pain, palpitations and leg swelling. Gastrointestinal: Negative for nausea and vomiting. Musculoskeletal: Positive for back pain and gait problem. Skin: Negative for color change and rash. Neurological: Positive for weakness. Negative for dizziness, tremors, seizures, syncope, facial asymmetry, speech difficulty, light-headedness, numbness and headaches. Psychiatric/Behavioral: Positive for behavioral problems and confusion. Negative for agitation and hallucinations. The patient is not nervous/anxious. Physical Exam  Vitals signs and nursing note reviewed. Constitutional:       General: He is not in acute distress. Appearance: He is not ill-appearing or diaphoretic. HENT:      Head: Normocephalic and atraumatic. Eyes:      General: No visual field deficit. Extraocular Movements: Extraocular movements intact. Pupils: Pupils are equal, round, and reactive to light. Cardiovascular:      Rate and Rhythm: Normal rate and regular rhythm. Pulmonary:      Effort: Pulmonary effort is normal. No respiratory distress. Breath sounds: Normal breath sounds. Skin:     General: Skin is warm and dry. Neurological:      General: No focal deficit present. Mental Status: He is alert. He is disoriented. Cranial Nerves: No cranial nerve deficit, dysarthria or facial asymmetry. Motor: No weakness, tremor or seizure activity. Deep Tendon Reflexes: Babinski sign absent on the right side. Babinski sign absent on the left side. Reflex Scores:       Patellar reflexes are 0 on the right side and 0 on the left side. Achilles reflexes are 0 on the right side and 0 on the left side.       BP (!) 143/82   Pulse 81   Temp 98.4 °F (36.9 °C) (Oral)   Resp 18   Ht 6' (1.829 m)   Wt 197 lb 3.2 oz (89.4 kg)   SpO2 95%   BMI 26.75 kg/m²           Medications:  Reviewed    Infusion Medications:    dilTIAZem (CARDIZEM) 125 mg in dextrose 5% 125 mL infusion Stopped (11/24/20 2015)     Scheduled Medications:    gabapentin  300 mg Oral BID    memantine  5 mg Oral BID    rivaroxaban  20 mg Oral Daily    metoprolol tartrate  50 mg Oral Q8H    digoxin  125 mcg Oral Daily    aspirin  81 mg Oral Daily    tamsulosin  0.4 mg Oral Daily    sodium chloride flush  10 mL Intravenous 2 times per day    azithromycin  500 mg Intravenous Q24H    And    cefTRIAXone (ROCEPHIN) IV  1 g Intravenous Q24H    insulin lispro  0-6 Units Subcutaneous TID WC    insulin lispro  0-3 Units Subcutaneous Nightly     PRN Meds: sodium chloride flush, acetaminophen **OR** acetaminophen, polyethylene glycol, promethazine **OR** ondansetron, ipratropium-albuterol    Labs:   Recent Labs     11/25/20 0623 11/26/20  0614 11/27/20  0610   WBC 9.3 7.6 9.3   HGB 13.9* 14.0 15.5   HCT 41.5* 41.8* 44.9    281 324     Recent Labs     11/25/20 0623 11/26/20  0614 11/27/20  0610   * 137 132*   K 4.1 4.5 4.3    105 99   CO2 20 21 20   BUN 16 16 15   CREATININE 0.74 0.75 0.70   CALCIUM 9.2 9.0 9.1     Recent Labs     11/25/20 0623 11/26/20  0614 11/27/20  0610   AST 12 10 11   ALT 13 10 13   BILITOT 0.7 0.6 0.6   ALKPHOS 55 51 60     No results for input(s): INR in the last 72 hours. No results for input(s): Jeff Altes in the last 72 hours. Urinalysis:   Lab Results   Component Value Date    NITRU Negative 11/21/2020    WBCUA 0-2 11/21/2020    BACTERIA Negative 11/21/2020    RBCUA 3-5 11/21/2020    BLOODU Negative 11/21/2020    SPECGRAV 1.024 11/21/2020    GLUCOSEU >=1000 11/21/2020       Radiology:   Most recent    EEG No procedure found. MRI of Brain No results found for this or any previous visit.   Results for orders placed during the hospital encounter of 11/21/20   MRI BRAIN WO CONTRAST    Narrative EXAMINATION: MRI BRAIN WO CONTRAST     CLINICAL HISTORY:  encephalopathy, new onset afib     COMPARISONS: Brain MRI from August 12, 2012    TECHNIQUE:    Multiplanar multisequence images of the brain were obtained without contrast. Diffusion perfusion imaging was obtained. FINDINGS:   This study is partly limited due to motion artifact. There are no extra-axial collections. There is no evidence of hemorrhage. There are no areas of signal abnormality on perfusion diffusion imaging to suggest ischemia. The susceptibility images do not demonstrate evidence of hemosiderin deposition within   the brain parenchyma or the leptomeninges. There is preservation of the gray-white matter differentiation. There are no areas of signal abnormality within the brain parenchyma or the posterior fossa to suggest lesion. There is prominence of the sulci and ventricles consistent with moderate global   cerebral atrophy and chronic involutional changes. The midline structures are intact, the corpus callosum is within normal limits. The region of the pineal gland and the sella turcica are unremarkable. There are no space-occupying lesions in the posterior fossa. The basilar cisterns are patent. The craniocervical junction is unremarkable. The visualized portions of the orbits are within normal limits, the globes are intact. The visualized portions of the paranasal sinuses are within normal limits. The calvarium and soft tissues are unremarkable. Impression There are no acute intracranial changes, no evidence of ischemia or hemorrhage. There are no regions of signal abnormality. MRA of the Head and Neck: No results found for this or any previous visit. No results found for this or any previous visit. No results found for this or any previous visit. CT of the Head:   Results for orders placed during the hospital encounter of 11/21/20   CT Head WO Contrast    Narrative CT Brain    Contrast medium:  Not utilized. History:   Altered mental status    Comparison:  CT brain, August 10, 2012 MRI brain, August 12, 2012    Findings:    Extra-axial spaces:  Normal.     Intracranial hemorrhage:  None. Ventricular system: Ventricles mildly enlarged. Sulci mildly prominent. Basal Cisterns:  Normal.    Cerebral Parenchyma: Bilateral symmetric periventricular areas decreased attenuation. Midline Shift:  None. Cerebellum:  Normal.     Paranasal sinuses and mastoid air cells:  Normal.    Visualized Orbits:  Normal.        Impression Impression:    No acute findings. Mild cerebral atrophy. Chronic ischemic white matter disease. All CT scans at this facility use dose modulation, iterative reconstruction, and/or weight based dosing when appropriate to reduce radiation dose to as low as reasonably achievable. No results found for this or any previous visit. No results found for this or any previous visit. Carotid duplex: No results found for this or any previous visit. No results found for this or any previous visit. Results for orders placed during the hospital encounter of 11/21/20   US CAROTID ARTERY BILATERAL    Narrative US CAROTID ARTERY BILATERAL: 11/22/2020    CLINICAL HISTORY:  stenosis . History of atherosclerotic disease. COMPARISON: 3/28/2008. Grayscale, color and waveform Doppler analysis of the cervical carotid and vertebral arteries was performed. Validated velocity measurements with angiographic measurements, velocity criteria are extrapolated from diameter data as defined by the Society of Radiologist in [address] [place] [address] Radiology 2003; 720;013-400. FINDINGS:    There is mild to moderate irregular atherosclerotic plaquing of the carotid bulbs without significantly elevated velocities, spectral waveform broadening, or incidental findings of concern identified. There is antegrade flow in both vertebral arteries.       ARTERIAL BLOOD FLOW VELOCITY    RIGHT Peak Systolic                                                  Prox CCA:  116 cm/s Mid CCA:  131 cm/s                Dist CCA:  1 cm/s                Prox ICA:  102 cm/s                 Mid ICA:  80 cm/s              Dist ICA:  70 cm/s               Prox ECA:  173 cm/s               Prox VERT:  66 cm/s                  ICA/CCA    0.70, which indicates less than 50% narrowing by velocity criteria. LEFT Peak Systolic    Prox CCA:  795 cm/s               Mid CCA:  117 cm/s                Dist CCA:  123 cm/s                Prox ICA:  107 cm/s                 Mid ICA:  86 cm/s              Dist ICA:  87 cm/s               Prox ECA:  83 cm/s               Prox VERT:  47 cm/s                     ICA/CCA     0.91, which indicates less than 50% narrowing by velocity criteria. Impression MILD TO MODERATE ATHEROSCLEROTIC PLAQUING OF THE CAROTID BULBS WITHOUT EVIDENCE OF A FLOW-LIMITING STENOSIS. Echo No results found for this or any previous visit. Assessment/Plan:    Syncope with a fall or truly a simple fall  Gait ataxia with difficulty to get up upon falling. [facility] was evaluated and patient does have lumbar canal stenosis. Y will evaluating him for the same we found a renal mass. Patient also on the last 2 days examination suggest cognitive impairment. He had no recall of consultant evaluations. His evaluations were noted and he truly does have lower extremity weakness with gait issues secondary to severe lumbar canal stenosis. We again discussed the diagnosis with him and he had no recall that Dr. Cristi Conti and has seen him. We again informed him that he has severe canal stenosis and that he will require surgical intervention though Dr. Cristi Conti note was reviewed and this is to be done as an outpatient. Patient also has a renal mass was seen by Dr. Gemma Denney and outpatient follow-up was recommended again he had no recall of those events. This gentleman truly has significant cognitive impairment and he continues on Aricept. He has dementia.     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 This may explain some of his gait issues. Patient will be discharged to skilled nursing facility from neurological standpoint patient was reexamined and we rediscussed his findings given his underlying cognitive issues    Physical therapy notes were reviewed for his gait instability and he does not require a device for now or supervision    In addition to the above patient was noted to have atrial fibrillation and has been started on Xarelto. This is for prevention of stroke again a neuro problem.     Electronically signed by Anton Alicia MD on 11/27/2020 at 1:10 PM

## 2020-11-27 NOTE — PROGRESS NOTES
Progress Note    2020   11:11 AM    Name:  Jeff Tripp  MRN:    [id number]     IP Day: 5     Admit Date: 2020 10:50 PM  PCP: JERRICA Younger CNP    Code Status:  Full Code    Subjective:     Patient denies any new symptoms. No chest pain or dyspnea. Physical Examination:      Vitals:  BP (!) 143/82   Pulse 81   Temp 98.4 °F (36.9 °C) (Oral)   Resp 18   Ht 6' (1.829 m)   Wt 197 lb 3.2 oz (89.4 kg)   SpO2 95%   BMI 26.75 kg/m²   Temp (24hrs), Av.3 °F (37.4 °C), Min:98.4 °F (36.9 °C), Max:99.9 °F (37.7 °C)      General appearance: alert, cooperative and no distress  Mental Status: oriented to person, place and time and normal affect  Lungs: clear to auscultation bilaterally, normal effort  Heart: regular rate and rhythm, no murmur  Abdomen: soft, nontender, nondistended, bowel sounds present, no masses  Extremities: no edema, redness, tenderness in the calves  Skin: no gross lesions, rashes    Data:     Labs:  Recent Labs     20  0610   WBC 7.6 9.3   HGB 14.0 15.5    324     Recent Labs     20  0614 20  0610    132*   K 4.5 4.3    99   CO2 21 20   BUN 16 15   CREATININE 0.75 0.70   GLUCOSE 379* 391*     Recent Labs     2014 20  0610   AST 10 11   ALT 10 13   BILITOT 0.6 0.6   ALKPHOS 51 60       Assessment and Plan:            Acute metabolic encephalopathy  Syncope  Fall  Possible pneumonia  L3-L4 spinal stenosis- severe   Urinary retention  Renal mass- concerning for RCC  New PAF- noted  during this admission     Plan    - MS is back to baseline   - converted to sinus. switch to xarelto. Dc lovenox. Added lopressor 25 bid. Cardiology on  - neuro is following. No CVA.    - NSGY seen for clifford stenosis, MRI noted, needs decompression as outpt  - renal mass possibly RCC, follow up with urology per Dr Amee Day   - montgomery to stay for retention until eval by urology outpt  - needs [facility]      -See my discharge summary note. 11/27  -Patient is awaiting skilled nursing facility placement. He is medically stable. He is asymptomatic. He will need follow-up with neurosurgery and urology for lumbar spinal stenosis and renal mass concerning for RCC respectively.   This is already mentioned discharge instruction         DISCHARGE PLANNING  Pending placement       Electronically signed by Sanjuana Oropeza DO on 11/27/2020 at 11:11 AM

## 2020-11-27 NOTE — PROGRESS NOTES
tx)  Scooting: Stand by assistance  Comment: bed flat, use of HR's, increased time d/t vc's for sequencing and improving technique, fair follow through. Min A for trunk during supine > sit    Transfers  Sit to Stand: Stand by assistance;Contact guard assistance  Stand to sit: Stand by assistance;Contact guard assistance  Bed to Chair: Stand by assistance;Contact guard assistance  Comment: vc's for safety and hand placement, good follow through. Dizziness with standing. Ambulation  Ambulation?: Yes  Ambulation 1  Surface: level tile  Device: Rolling Walker  Assistance: Minimal assistance  Quality of Gait: NBOS, decreased kash heel strike and foot clearance, variable use of ww  Gait Deviations: Decreased step length;Decreased step height  Distance: 5'  Comments: Pt occ lifting WW up, no LOB. Distance limited secondary to pt frequently complaining of dizziness. Neuromuscular Education  Neuromuscular Comments: static standing weight shifts at [facility]     Exercises  Hip Flexion: x10  Knee Long Arc Quad: x10         ASSESSMENT   Assessment: Frequent vc's needed throughout tx. Carryover varies from good to fair depneding on task. Increased time needed during bed mobility secondary to encouragement provided for pt to come to sitting. Pt declining further ambulation once in chair secondary to dizziness, nsg notified. Discharge Recommendations:  Continue to assess pending progress    Goals  Short term goals  Short term goal 1: min assist +1 transfers  Short term goal 2: min assist +1 gait with ww 50 feet  Short term goal 3: pt able to stand without UE support 2 min with SBA  Long term goals  Long term goal 1: SBA bed mobiliyt  Long term goal 2: SBA sit to stand  Long term goal 3: SBA gait with or without device 50 feet    PLAN    Times per week: 3-6  Plan Comment: Cont. POC  Safety Devices  Type of devices:  All fall risk precautions in place, Telesitter in use, Call light within reach, Chair alarm in place, Left in chair, Nurse notified     [facility] (6 CLICK) [id number] Mobility Raw Score : 18     Therapy Time   Individual   Time In 9604   Time Out 1010   Minutes 15      BM/Trsf: 8  Gait: 5  There ex: 2       Cyndi Morrowerer, PTA, 11/27/20 at 10:18 AM         Definitions for assistance levels  Independent = pt does not require any physical supervision or assistance from another person for activity completion. Device may be needed.   Stand by assistance = pt requires verbal cues or instructions from another person, close to but not touching, to perform the activity  Minimal assistance= pt performs 75% or more of the activity; assistance is required to complete the activity  Moderate assistance= pt performs 50% of the activity; assistance is required to complete the activity  Maximal assistance = pt performs 25% of the activity; assistance is required to complete the activity  Dependent = pt requires total physical assistance to accomplish the task

## 2020-11-27 NOTE — DISCHARGE INSTR - COC
Last Indicated By Review Planned Expiration Resolved Resolved By    None active    Resolved    COVID-19 Rule Out 11/22/20 11/22/20 11/23/20 COVID-19 (Ordered)   11/23/20 Rule-Out Test Resulted    COVID-19 Rule Out 11/22/20 11/22/20 11/22/20 COVID-19 (Ordered)   11/22/20 Rule-Out Test Resulted            Nurse Assessment:  Last Vital Signs: /84   Pulse 79   Temp 99.5 °F (37.5 °C) (Oral)   Resp 18   Ht 6' (1.829 m)   Wt 197 lb 3.2 oz (89.4 kg)   SpO2 97%   BMI 26.75 kg/m²     Last documented pain score (0-10 scale): Pain Level: 0  Last Weight:   Wt Readings from Last 1 Encounters:   11/26/20 197 lb 3.2 oz (89.4 kg)     Mental Status:  disoriented and alert    IV Access:  - None    Nursing Mobility/ADLs:  Walking   Dependent  Transfer  Dependent  Bathing  Dependent  Dressing  Dependent  Toileting  Dependent  Feeding  Independent  Med Admin  Dependent  Med Delivery   whole    Wound Care Documentation and Therapy:        Elimination:  Continence:   · Bowel: Yes  · Bladder: Yes  Urinary Catheter: Insertion Date: 11/23/2020   Colostomy/Ileostomy/Ileal Conduit: No       Date of Last BM: 11/27/2020    Intake/Output Summary (Last 24 hours) at 11/27/2020 0757  Last data filed at 11/27/2020 0557  Gross per 24 hour   Intake 1745 ml   Output 2650 ml   Net -905 ml     I/O last 3 completed shifts: In: 2105 [P.O.:1795; I.V.:310]  Out: 2650 [Urine:2650]    Safety Concerns: At Risk for Falls    Impairments/Disabilities:      None    Nutrition Therapy:  Current Nutrition Therapy:   - Oral Diet:  Carb Control 3 carbs/meal (1500kcals/day)    Routes of Feeding: Oral  Liquids: Thin Liquids  Daily Fluid Restriction: no  Last Modified Barium Swallow with Video (Video Swallowing Test): not done    Treatments at the Time of Hospital Discharge:   Respiratory Treatments: ***  Oxygen Therapy:  is not on home oxygen therapy.   Ventilator:    - No ventilator support    Rehab Therapies: Physical Therapy and Occupational Therapy  Weight Bearing Status/Restrictions: No weight bearing restirctions  Other Medical Equipment (for information only, NOT a DME order):  {EQUIPMENT:[id number]}  Other Treatments: ***    Patient's personal belongings (please select all that are sent with patient):  None    RN SIGNATURE:  Electronically signed by Adolph Jimenez RN on 11/29/20 at 2:58 PM EST    CASE MANAGEMENT/SOCIAL WORK SECTION    Inpatient Status Date: ***    Readmission Risk Assessment Score:  Readmission Risk              Risk of Unplanned Readmission:        11           Discharging to Facility/ Agency   · Name: [facility]  · Address:  · Phone:  [phone]  · Fax:    ·     / signature: Electronically signed by BELEN Moran on 11/27/20 at 2:53 PM EST      PHYSICIAN SECTION    Prognosis: Fair    Condition at Discharge: Stable    Rehab Potential (if transferring to Rehab): Good    Recommended Labs or Other Treatments After Discharge: none     Physician Certification: I certify the above information and transfer of Rita Linares  is necessary for the continuing treatment of the diagnosis listed and that he requires [facility] for less 30 days.      Update Admission H&P: No change in H&P    PHYSICIAN SIGNATURE:  Electronically signed by Margarette Read DO on 11/27/20 at 7:58 AM EST

## 2020-11-28 LAB
ALBUMIN SERPL-MCNC: 3.4 G/DL (ref 3.5–4.6)
ALP BLD-CCNC: 60 U/L (ref 35–104)
ALT SERPL-CCNC: 18 U/L (ref 0–41)
ANION GAP SERPL CALCULATED.3IONS-SCNC: 20 MEQ/L (ref 9–15)
AST SERPL-CCNC: 15 U/L (ref 0–40)
BASOPHILS ABSOLUTE: 0.1 K/UL (ref 0–0.2)
BASOPHILS RELATIVE PERCENT: 1.1 %
BILIRUB SERPL-MCNC: 0.7 MG/DL (ref 0.2–0.7)
BUN BLDV-MCNC: 18 MG/DL (ref 8–23)
CALCIUM SERPL-MCNC: 10.1 MG/DL (ref 8.5–9.9)
CHLORIDE BLD-SCNC: 99 MEQ/L (ref 95–107)
CO2: 16 MEQ/L (ref 20–31)
CREAT SERPL-MCNC: 0.58 MG/DL (ref 0.7–1.2)
EOSINOPHILS ABSOLUTE: 0.2 K/UL (ref 0–0.7)
EOSINOPHILS RELATIVE PERCENT: 2.6 %
GFR AFRICAN AMERICAN: >60
GFR NON-AFRICAN AMERICAN: >60
GLOBULIN: 3.6 G/DL (ref 2.3–3.5)
GLUCOSE BLD-MCNC: 382 MG/DL (ref 60–115)
GLUCOSE BLD-MCNC: 385 MG/DL (ref 70–99)
GLUCOSE BLD-MCNC: 412 MG/DL (ref 60–115)
GLUCOSE BLD-MCNC: 422 MG/DL (ref 60–115)
HCT VFR BLD CALC: 45.4 % (ref 42–52)
HEMOGLOBIN: 15.4 G/DL (ref 14–18)
LYMPHOCYTES ABSOLUTE: 1.5 K/UL (ref 1–4.8)
LYMPHOCYTES RELATIVE PERCENT: 15.5 %
MCH RBC QN AUTO: 31.2 PG (ref 27–31.3)
MCHC RBC AUTO-ENTMCNC: 33.8 % (ref 33–37)
MCV RBC AUTO: 92.2 FL (ref 80–100)
MONOCYTES ABSOLUTE: 0.6 K/UL (ref 0.2–0.8)
MONOCYTES RELATIVE PERCENT: 6.4 %
NEUTROPHILS ABSOLUTE: 7 K/UL (ref 1.4–6.5)
NEUTROPHILS RELATIVE PERCENT: 74.4 %
PDW BLD-RTO: 13.2 % (ref 11.5–14.5)
PERFORMED ON: ABNORMAL
PLATELET # BLD: 370 K/UL (ref 130–400)
POTASSIUM REFLEX MAGNESIUM: 4.3 MEQ/L (ref 3.4–4.9)
RBC # BLD: 4.92 M/UL (ref 4.7–6.1)
SODIUM BLD-SCNC: 135 MEQ/L (ref 135–144)
TOTAL PROTEIN: 7 G/DL (ref 6.3–8)
WBC # BLD: 9.4 K/UL (ref 4.8–10.8)

## 2020-11-28 PROCEDURE — 6370000000 HC RX 637 (ALT 250 FOR IP): Performed by: INTERNAL MEDICINE

## 2020-11-28 PROCEDURE — 80053 COMPREHEN METABOLIC PANEL: CPT

## 2020-11-28 PROCEDURE — 6370000000 HC RX 637 (ALT 250 FOR IP): Performed by: NURSE PRACTITIONER

## 2020-11-28 PROCEDURE — 2580000003 HC RX 258: Performed by: NURSE PRACTITIONER

## 2020-11-28 PROCEDURE — 2060000000 HC ICU INTERMEDIATE R&B

## 2020-11-28 PROCEDURE — 51702 INSERT TEMP BLADDER CATH: CPT

## 2020-11-28 PROCEDURE — 97535 SELF CARE MNGMENT TRAINING: CPT

## 2020-11-28 PROCEDURE — 6360000002 HC RX W HCPCS: Performed by: NURSE PRACTITIONER

## 2020-11-28 PROCEDURE — 85025 COMPLETE CBC W/AUTO DIFF WBC: CPT

## 2020-11-28 PROCEDURE — 36415 COLL VENOUS BLD VENIPUNCTURE: CPT

## 2020-11-28 PROCEDURE — 99232 SBSQ HOSP IP/OBS MODERATE 35: CPT | Performed by: INTERNAL MEDICINE

## 2020-11-28 RX ORDER — CEFUROXIME AXETIL 500 MG/1
500 TABLET ORAL EVERY 12 HOURS SCHEDULED
Status: DISCONTINUED | OUTPATIENT
Start: 2020-11-28 | End: 2020-11-29 | Stop reason: HOSPADM

## 2020-11-28 RX ADMIN — ASPIRIN 81 MG CHEWABLE TABLET 81 MG: 81 TABLET CHEWABLE at 08:06

## 2020-11-28 RX ADMIN — DIGOXIN 125 MCG: 125 TABLET ORAL at 08:06

## 2020-11-28 RX ADMIN — RIVAROXABAN 20 MG: 20 TABLET, FILM COATED ORAL at 16:30

## 2020-11-28 RX ADMIN — CEFUROXIME AXETIL 500 MG: 500 TABLET ORAL at 20:54

## 2020-11-28 RX ADMIN — Medication 10 ML: at 20:55

## 2020-11-28 RX ADMIN — MEMANTINE HYDROCHLORIDE 5 MG: 5 TABLET ORAL at 08:06

## 2020-11-28 RX ADMIN — AZITHROMYCIN 500 MG: 500 INJECTION, POWDER, LYOPHILIZED, FOR SOLUTION INTRAVENOUS at 05:06

## 2020-11-28 RX ADMIN — TAMSULOSIN HYDROCHLORIDE 0.4 MG: 0.4 CAPSULE ORAL at 08:06

## 2020-11-28 RX ADMIN — CEFTRIAXONE SODIUM 1 G: 1 INJECTION, POWDER, FOR SOLUTION INTRAMUSCULAR; INTRAVENOUS at 04:34

## 2020-11-28 RX ADMIN — GABAPENTIN 300 MG: 300 CAPSULE ORAL at 15:06

## 2020-11-28 RX ADMIN — MEMANTINE HYDROCHLORIDE 5 MG: 5 TABLET ORAL at 20:54

## 2020-11-28 RX ADMIN — METOPROLOL TARTRATE 50 MG: 50 TABLET, FILM COATED ORAL at 08:06

## 2020-11-28 RX ADMIN — INSULIN LISPRO 6 UNITS: 100 INJECTION, SOLUTION INTRAVENOUS; SUBCUTANEOUS at 12:44

## 2020-11-28 RX ADMIN — Medication 10 ML: at 08:07

## 2020-11-28 RX ADMIN — GABAPENTIN 300 MG: 300 CAPSULE ORAL at 08:06

## 2020-11-28 RX ADMIN — INSULIN LISPRO 6 UNITS: 100 INJECTION, SOLUTION INTRAVENOUS; SUBCUTANEOUS at 16:10

## 2020-11-28 RX ADMIN — METOPROLOL TARTRATE 50 MG: 50 TABLET, FILM COATED ORAL at 16:00

## 2020-11-28 RX ADMIN — INSULIN LISPRO 5 UNITS: 100 INJECTION, SOLUTION INTRAVENOUS; SUBCUTANEOUS at 08:07

## 2020-11-28 ASSESSMENT — PAIN SCALES - GENERAL
PAINLEVEL_OUTOF10: 0

## 2020-11-28 ASSESSMENT — PAIN DESCRIPTION - DESCRIPTORS
DESCRIPTORS: DULL;DISCOMFORT

## 2020-11-28 ASSESSMENT — ENCOUNTER SYMPTOMS
COLOR CHANGE: 0
SHORTNESS OF BREATH: 0
APNEA: 0
CHEST TIGHTNESS: 0

## 2020-11-28 ASSESSMENT — PAIN DESCRIPTION - LOCATION
LOCATION: GENERALIZED

## 2020-11-28 ASSESSMENT — PAIN DESCRIPTION - PAIN TYPE
TYPE: ACUTE PAIN;CHRONIC PAIN
TYPE: CHRONIC PAIN;ACUTE PAIN
TYPE: ACUTE PAIN;CHRONIC PAIN
TYPE: ACUTE PAIN;CHRONIC PAIN

## 2020-11-28 NOTE — CARE COORDINATION
SPOKE TO Patty Lopez, [facility] HAS ACCEPTED THE PATIENT AND SHE WILL START PRECERT. DR HANSON UPDATED EARLIER. PT WILL NEED PRECERT TO GO TO [id number] [facility].

## 2020-11-28 NOTE — CARE COORDINATION
SPOKE TO Sarita Yates. SHE WILL SEND ADDITIONAL INFO TO MEDICAL DIRECTOR, IF ACCEPTED THEY WILL BE ABLE TO START PRECERT THIS WEEKEND. CYNTHIAOR TO CALL CM BACK WITH DECISION.

## 2020-11-28 NOTE — PROGRESS NOTES
Progress Note    2020   8:28 AM    Name:  Gaurav Ariza  MRN:    [id number]      Day: 6     Admit Date: 2020 10:50 PM  PCP: JERRICA Lion CNP    Code Status:  Full Code    Subjective:     Patient denies any new symptoms. No chest pain or dyspnea. Physical Examination:      Vitals:  /75   Pulse 77   Temp 98.4 °F (36.9 °C) (Oral)   Resp 17   Ht 6' (1.829 m)   Wt 197 lb 3.2 oz (89.4 kg)   SpO2 96%   BMI 26.75 kg/m²   Temp (24hrs), Av.8 °F (37.1 °C), Min:98.4 °F (36.9 °C), Max:99.3 °F (37.4 °C)      General appearance: alert, cooperative and no distress  Mental Status: oriented to person only. Pleasantly confused. Lungs: clear to auscultation bilaterally, normal effort  Heart: regular rate and rhythm, no murmur  Abdomen: soft, nontender, nondistended, bowel sounds present, no masses  Extremities: no edema, redness, tenderness in the calves  Skin: no gross lesions, rashes    Data:     Labs:  Recent Labs     20  0610 20  0558   WBC 9.3 9.4   HGB 15.5 15.4    370     Recent Labs     20  0610 20  0558   * 135   K 4.3 4.3   CL 99 99   CO2 20 16*   BUN 15 18   CREATININE 0.70 0.58*   GLUCOSE 391* 385*     Recent Labs     20  0610 20  0558   AST 11 15   ALT 13 18   BILITOT 0.6 0.7   ALKPHOS 60 60       Assessment and Plan:            Acute metabolic encephalopathy  Syncope  Fall  Possible pneumonia  L3-L4 spinal stenosis- severe   Urinary retention  Renal mass- concerning for RCC  New PAF- noted  during this admission     Plan    - MS is back to baseline   - converted to sinus. switch to xarelto. Dc lovenox. Added lopressor 25 bid. Cardiology on  - neuro is following. No CVA.    - NSGY seen for clifford stenosis, MRI noted, needs decompression as outpt  - renal mass possibly RCC, follow up with urology per Dr Loulou montgomery to stay for retention until eval by urology outpt  - needs [facility]      -See my discharge summary note. 11/27  -Patient is awaiting skilled nursing facility placement. He is medically stable. He is asymptomatic. He will need follow-up with neurosurgery and urology for lumbar spinal stenosis and renal mass concerning for RCC respectively. This is already mentioned discharge instruction    11/28  -Awaiting SNF placement. Medically stable to be discharged today. Discussed with case management.            DISCHARGE PLANNING  Pending placement       Electronically signed by Ruel Blake DO on 11/28/2020 at 8:28 AM

## 2020-11-28 NOTE — PROGRESS NOTES
Comprehensive Nutrition Assessment    Type and Reason for Visit:  Initial, RD Nutrition Re-Screen/LOS    Nutrition Recommendations/Plan: Modify Current Diet, Start Oral Nutrition Supplement(Add carb control 4 level; will order diabetic ONS once daily)    Nutrition Assessment:  Pt admitted with syncope and collapse. He remains confused, O&A x2. Intake has been % of meals. Pt is pending SNF placement. There are not weight records available. Will order ONS as intake has not consistently been >75% of meals. Malnutrition Assessment:  Malnutrition Status:  Insufficient data    Context:  Chronic Illness     Findings of the 6 clinical characteristics of malnutrition:  Energy Intake:  Unable to assess  Weight Loss:  Unable to assess     Body Fat Loss:  Unable to assess     Muscle Mass Loss:  Unable to assess    Fluid Accumulation:  No significant fluid accumulation     Strength:  Not Performed    Estimated Daily Nutrient Needs:  Energy (kcal):  9071-3160 (20-22 kcals/kg); Weight Used for Energy Requirements:  Current(89 kg)     Protein (g):  81-89 (1-1.1 g/kg); Weight Used for Protein Requirements:  Ideal(81 kg)        Fluid (ml/day):  1800 mL; Method Used for Fluid Requirements:  1 ml/kcal      Nutrition Related Findings:  POC glucose 252-412. Meds include insulin. ABD soft, nondistedned, active BS x4. No edema present per nsg. PMH includes hx of trach, skin CA, DM, and stroke. Wounds:  None       Current Nutrition Therapies:    DIET CARB CONTROL; No Caffeine    Anthropometric Measures:  · Height: 6' (182.9 cm)  · Current Body Weight: 197 lb (89.4 kg)(11/26)   · Admission Body Weight: 212 lb (96.2 kg)(11/21 stated)    · Usual Body Weight: (none available)     · Ideal Body Weight: 178 lbs; % Ideal Body Weight 110.7 %   · BMI: 26.7  · Adjusted Body Weight:  ; No Adjustment   · BMI Categories: Overweight (BMI 25.0-29. 9)       Nutrition Diagnosis:   · Inadequate oral intake related to cognitive or neurological impairment as evidenced by (average intake <75% of meals)      Nutrition Interventions:   Food and/or Nutrient Delivery:  Modify Current Diet, Start Oral Nutrition Supplement(Add carb control 4 level; will order diabetic ONS once daily)  Nutrition Education/Counseling:  Education not indicated   Coordination of Nutrition Care:  Continue to monitor while inpatient    Goals:  PO intake >75% of meals/ONS. Stable weight ~196 lb. Blood glucose <180 mg/dL. BM at least every other day.        Nutrition Monitoring and Evaluation:   Behavioral-Environmental Outcomes:  None Identified   Food/Nutrient Intake Outcomes:  Food and Nutrient Intake, Supplement Intake  Physical Signs/Symptoms Outcomes:  Biochemical Data, GI Status, Fluid Status or Edema, Skin, Weight     Discharge Planning:    Continue current diet     Electronically signed by Juliana Mathias, MS, RD, LD on 11/28/20 at 1:27 PM EST

## 2020-11-28 NOTE — PROGRESS NOTES
Spiritual Care Services     Summary of Visit:  Pt looks a little confused but he answered most of my questions. I encouraged and comforted him. We prayed and I anointed him. Spiritual Assessment/Intervention/Outcomes:    Encounter Summary  Services provided to[de-identified] Patient  [facility]  Support System: Family members  Place of Buddhism: [facility] Visiting: Yes  Complexity of Encounter: Moderate  Length of Encounter: 15 minutes  Spiritual Assessment Completed: Yes  Routine  Type: Initial  Assessment: Approachable, Calm  Intervention: Sustaining presence/ Ministry of presence, Active listening  Outcome: Receptive, Engaged in conversation     Spiritual/Congregational  Type: Spiritual support  Assessment: Approachable, Anxious, Concerns with suffering  Intervention: Anointing, Prayer, Nurtured hope, Explored coping resources, Explored feelings, thoughts, concerns, Active listening  Outcome: Expressed gratitude, Receptive  Sacraments  Sacrament of Sick-Anointing: Anointed     Advance Directives (For Healthcare)  Pre-existing DNR Comfort Care/DNR Arrest/DNI Order: No  Healthcare Directive: No, patient does not have an advance directive for healthcare treatment  Information on Healthcare Directives Requested: No  Patient Requests Assistance: No                Care Plan:        [id number] [address]   Electronically signed by Sandi Friedman on 11/28/20 at 11:24 AM EST     To reach a  for emotional and spiritual support, place an [facility] consult request.   If a  is needed immediately, dial 0 and ask to page the on-call .

## 2020-11-28 NOTE — PLAN OF CARE
Nutrition Problem #1: Inadequate oral intake  Intervention: Food and/or Nutrient Delivery: Modify Current Diet, Start Oral Nutrition Supplement(Add carb control 4 level; will order diabetic ONS once daily)  Nutritional Goals: PO intake >75% of meals/ONS. Stable weight ~196 lb. Blood glucose <180 mg/dL. BM at least every other day.

## 2020-11-28 NOTE — PROGRESS NOTES
Progress Note  Patient: Maegan De La Cruz  Unit/Bed: S533/R326-16  YOB: 1950  MRN: [id number]  Acct: [de-identified]   Admitting Diagnosis: Syncope and collapse [R55]  Community acquired pneumonia of both lower lobes [J18.9]  Date:  11/21/2020  Hospital Day: 6    Chief Complaint:  Mental status changes A. fib    Subjective  Sitting in the chair. Not much verbal in sinus rhythm. Ate well. Awaiting transfer to nursing home. Labs were noted hemoglobin stable at 15.4 platelet count is 886 WBC 9.4    Review of Systems:   Review of Systems   Constitutional: Negative for appetite change, diaphoresis and fatigue. Respiratory: Negative for apnea, chest tightness and shortness of breath. Cardiovascular: Negative for chest pain, palpitations and leg swelling. Skin: Negative for color change, pallor, rash and wound. Neurological: Negative for dizziness, syncope, weakness, light-headedness and headaches. Psychiatric/Behavioral: Negative for agitation, behavioral problems and confusion. The patient is not nervous/anxious and is not hyperactive. Physical Examination:    BP 93/62   Pulse 84   Temp 98.2 °F (36.8 °C) (Oral)   Resp 18   Ht 6' (1.829 m)   Wt 197 lb 3.2 oz (89.4 kg)   SpO2 100%   BMI 26.75 kg/m²    Physical Exam  Constitutional:       Appearance: He is well-developed. Cardiovascular:      Rate and Rhythm: Normal rate and regular rhythm. Heart sounds: Normal heart sounds. Pulmonary:      Effort: Pulmonary effort is normal.      Breath sounds: Normal breath sounds. Musculoskeletal: Normal range of motion. Skin:     General: Skin is warm and dry. Neurological:      Mental Status: He is alert and oriented to person, place, and time. Deep Tendon Reflexes: Reflexes are normal and symmetric. Psychiatric:         Behavior: Behavior normal.         Thought Content:  Thought content normal.         Judgment: Judgment normal.         LABS:  CBC:   Lab Results   Component Value Date    WBC 9.4 11/28/2020    RBC 4.92 11/28/2020    HGB 15.4 11/28/2020    HCT 45.4 11/28/2020    MCV 92.2 11/28/2020    MCH 31.2 11/28/2020    MCHC 33.8 11/28/2020    RDW 13.2 11/28/2020     11/28/2020    MPV 7.3 08/12/2012     CBC with Differential:   Lab Results   Component Value Date    WBC 9.4 11/28/2020    RBC 4.92 11/28/2020    HGB 15.4 11/28/2020    HCT 45.4 11/28/2020     11/28/2020    MCV 92.2 11/28/2020    MCH 31.2 11/28/2020    MCHC 33.8 11/28/2020    RDW 13.2 11/28/2020    LYMPHOPCT 15.5 11/28/2020    MONOPCT 6.4 11/28/2020    BASOPCT 1.1 11/28/2020    MONOSABS 0.6 11/28/2020    LYMPHSABS 1.5 11/28/2020    EOSABS 0.2 11/28/2020    BASOSABS 0.1 11/28/2020     CMP:    Lab Results   Component Value Date     11/28/2020    K 4.3 11/28/2020    CL 99 11/28/2020    CO2 16 11/28/2020    BUN 18 11/28/2020    CREATININE 0.58 11/28/2020    GFRAA >60.0 11/28/2020    LABGLOM >60.0 11/28/2020    GLUCOSE 385 11/28/2020    PROT 7.0 11/28/2020    LABALBU 3.4 11/28/2020    CALCIUM 10.1 11/28/2020    BILITOT 0.7 11/28/2020    ALKPHOS 60 11/28/2020    AST 15 11/28/2020    ALT 18 11/28/2020     BMP:    Lab Results   Component Value Date     11/28/2020    K 4.3 11/28/2020    CL 99 11/28/2020    CO2 16 11/28/2020    BUN 18 11/28/2020    LABALBU 3.4 11/28/2020    CREATININE 0.58 11/28/2020    CALCIUM 10.1 11/28/2020    GFRAA >60.0 11/28/2020    LABGLOM >60.0 11/28/2020    GLUCOSE 385 11/28/2020     Magnesium:    Lab Results   Component Value Date    MG 2.3 08/12/2012     Troponin:    Lab Results   Component Value Date    TROPONINI <0.010 11/24/2020       Radiology:  No results found.      EKG: Sinus rhythm      Assessment:    Active Hospital Problems    Diagnosis Date Noted    Paroxysmal atrial fibrillation (HCC) [I48.0]     Neurogenic claudication due to lumbar spinal stenosis [M48.062] 11/24/2020    Hyperglycemia [R73.9]     Renal mass, left [N28.89]     Urinary retention [R33.9]     Spinal stenosis of lumbar region with neurogenic claudication [M48.062]     Weakness [R53.1]     Syncope and collapse [R55] 11/22/2020    Pneumonia [J18.9] 11/22/2020    Dementia ([facility] [F03.90] 11/22/2020    Community acquired pneumonia of both lower lobes [J18.9] 11/22/2020    Bipolar 1 disorder ([facility] [F31.9] 10/01/2015    Diabetes ([facility] [E11.9]            Plan:  1. We will discontinue aspirin. He is on Xarelto  2. In sinus rhythm. Continue Lopressor 50 3 times daily  3     will be discharged home on digoxin 0.125.   LV ejection fraction          normal  Electronically signed by Bhavya Almanza MD on 11/28/2020 at 3:09 PM

## 2020-11-28 NOTE — PROGRESS NOTES
[facility] MED SURG TREATMENT NOTE     Date: 2020  Patient Name: Stephany Aldana        MRN: [id number]  Account: [de-identified]   : 1950  (65 y.o.)  Room: [place] [address]    Chart Review:  Diagnosis:  The primary encounter diagnosis was Hyperglycemia. A diagnosis of TIA (transient ischemic attack) was also pertinent to this visit. Restrictions:    Restrictions/Precautions  Restrictions/Precautions: Fall Risk  Position Activity Restriction  Other position/activity restrictions: 1:1 in room    Subjective:  Patient states:  \"I am just like the TV. \"  Pain:  Start of tx:  Pre Treatment Pain Screening  Pain at present: 0  Scale Used: Numeric Score    End of tx:  Pain Assessment  Patient Currently in Pain: No    Objective:    Pt seated in chair. Avasys in room. Pt participated in grooming activity. VC to initiate combing hair when provided brush. Pt able to use mirror after therapist suggestion. Pt then brushed teeth. Unable to rotate cap with pads of fingers. Pt bit down on cap and turned. Pt with difficulty squeezing tube. Pt placed half tube in mouth and bit down to extract toothpaste in mouth. Pt with decreased problem solving during activity. VC's to use items (trough to spit water out vs. Mouthful of water in paper towel). Cognition: Stated date as Dec 2010. Educated pt on correct date and location.      STEVEN hose donned: No  If no, why: n/a    Therapy key for assistance levels -   Independent = Pt. is able to perform task with no assistance but may require a device   Stand by assistance = Pt. does not perform task at an independent level but does not need physical assistance, requires verbal cues  Minimal, Moderate, Maximal Assistance = Pt. requires physical assistance (25%, 50%, 75% assist from helper) for task but is able to actively participate in task   Dependent = Pt. requires total assistance with task and is not able to actively participate with task completion    Treatment consisted of:  ADL Training    Assessment/Discharge Disposition:     Performance deficits / Impairments: Decreased functional mobility , Decreased ADL status, Decreased safe awareness, Decreased cognition, Decreased high-level IADLs, Decreased balance  Prognosis: Fair  Discharge Recommendations: Continue to assess pending progress  History: Multi comorb  Exam: 6 perf imp  Assistance / Modification: Min A      6-Click  How much help for putting on and taking off regular lower body clothing?: A Little  How much help for Bathing?: A Little  How much help for Toileting?: A Little  How much help for putting on and taking off regular upper body clothing?: A Little  How much help for taking care of personal grooming?: A Little  How much help for eating meals?: A Little  AM-PAC Inpatient Daily Activity Raw Score: 18  AM-PAC Inpatient ADL T-Scale Score : 38.66  ADL Inpatient CMS 0-100% Score: 46.65    Plan:  Continue OT per POC    Goals/Plan:    Improve ADL [place]    Minutes:    OT Individual Minutes  Time In: 1153  Time Out: 1203  Minutes: 10    Therapeutic activities: 10 minutes    Electronically signed by:     Carmen Mercer  11/28/2020, 12:25 PM

## 2020-11-29 VITALS
SYSTOLIC BLOOD PRESSURE: 105 MMHG | OXYGEN SATURATION: 95 % | RESPIRATION RATE: 18 BRPM | HEIGHT: 72 IN | WEIGHT: 197.2 LBS | TEMPERATURE: 98.2 F | BODY MASS INDEX: 26.71 KG/M2 | DIASTOLIC BLOOD PRESSURE: 62 MMHG | HEART RATE: 70 BPM

## 2020-11-29 LAB
GLUCOSE BLD-MCNC: 254 MG/DL (ref 60–115)
GLUCOSE BLD-MCNC: 340 MG/DL (ref 60–115)
GLUCOSE BLD-MCNC: 419 MG/DL (ref 60–115)
PERFORMED ON: ABNORMAL
SARS-COV-2, NAAT: NOT DETECTED

## 2020-11-29 PROCEDURE — 51702 INSERT TEMP BLADDER CATH: CPT

## 2020-11-29 PROCEDURE — 99232 SBSQ HOSP IP/OBS MODERATE 35: CPT | Performed by: INTERNAL MEDICINE

## 2020-11-29 PROCEDURE — 6370000000 HC RX 637 (ALT 250 FOR IP): Performed by: INTERNAL MEDICINE

## 2020-11-29 PROCEDURE — U0002 COVID-19 LAB TEST NON-CDC: HCPCS

## 2020-11-29 PROCEDURE — 2580000003 HC RX 258: Performed by: NURSE PRACTITIONER

## 2020-11-29 RX ORDER — INSULIN GLARGINE 100 [IU]/ML
6 INJECTION, SOLUTION SUBCUTANEOUS 2 TIMES DAILY
Status: DISCONTINUED | OUTPATIENT
Start: 2020-11-29 | End: 2020-11-29 | Stop reason: HOSPADM

## 2020-11-29 RX ORDER — DEXTROSE MONOHYDRATE 25 G/50ML
12.5 INJECTION, SOLUTION INTRAVENOUS PRN
Status: DISCONTINUED | OUTPATIENT
Start: 2020-11-29 | End: 2020-11-29 | Stop reason: HOSPADM

## 2020-11-29 RX ORDER — DEXTROSE MONOHYDRATE 50 MG/ML
100 INJECTION, SOLUTION INTRAVENOUS PRN
Status: DISCONTINUED | OUTPATIENT
Start: 2020-11-29 | End: 2020-11-29 | Stop reason: HOSPADM

## 2020-11-29 RX ORDER — INSULIN GLARGINE 100 [IU]/ML
6 INJECTION, SOLUTION SUBCUTANEOUS 2 TIMES DAILY
Qty: 1 VIAL | Refills: 3 | Status: ON HOLD
Start: 2020-11-29 | End: 2020-12-14 | Stop reason: HOSPADM

## 2020-11-29 RX ORDER — NICOTINE POLACRILEX 4 MG
15 LOZENGE BUCCAL PRN
Status: DISCONTINUED | OUTPATIENT
Start: 2020-11-29 | End: 2020-11-29 | Stop reason: HOSPADM

## 2020-11-29 RX ADMIN — INSULIN LISPRO 6 UNITS: 100 INJECTION, SOLUTION INTRAVENOUS; SUBCUTANEOUS at 12:26

## 2020-11-29 RX ADMIN — GABAPENTIN 300 MG: 300 CAPSULE ORAL at 09:38

## 2020-11-29 RX ADMIN — INSULIN GLARGINE 6 UNITS: 100 INJECTION, SOLUTION SUBCUTANEOUS at 09:43

## 2020-11-29 RX ADMIN — MEMANTINE HYDROCHLORIDE 5 MG: 5 TABLET ORAL at 09:37

## 2020-11-29 RX ADMIN — DIGOXIN 125 MCG: 125 TABLET ORAL at 09:37

## 2020-11-29 RX ADMIN — INSULIN LISPRO 3 UNITS: 100 INJECTION, SOLUTION INTRAVENOUS; SUBCUTANEOUS at 09:39

## 2020-11-29 RX ADMIN — Medication 10 ML: at 12:29

## 2020-11-29 RX ADMIN — METOPROLOL TARTRATE 50 MG: 50 TABLET, FILM COATED ORAL at 00:10

## 2020-11-29 RX ADMIN — INSULIN LISPRO 4 UNITS: 100 INJECTION, SOLUTION INTRAVENOUS; SUBCUTANEOUS at 16:38

## 2020-11-29 RX ADMIN — RIVAROXABAN 20 MG: 20 TABLET, FILM COATED ORAL at 16:36

## 2020-11-29 RX ADMIN — METOPROLOL TARTRATE 50 MG: 50 TABLET, FILM COATED ORAL at 09:37

## 2020-11-29 RX ADMIN — GABAPENTIN 300 MG: 300 CAPSULE ORAL at 16:36

## 2020-11-29 RX ADMIN — TAMSULOSIN HYDROCHLORIDE 0.4 MG: 0.4 CAPSULE ORAL at 12:26

## 2020-11-29 RX ADMIN — CEFUROXIME AXETIL 500 MG: 500 TABLET ORAL at 09:37

## 2020-11-29 NOTE — FLOWSHEET NOTE
1900 Assumed care of pt from [facility].    2100 PM assessment and medications completed. Pt is sitting up in chair and declines returning to bed. He is drowsy but denies further needs at this time. 2230 Pt returned to bed. He denies further needs at this time.

## 2020-11-29 NOTE — FLOWSHEET NOTE
Sergio called to [facility]. [facility] here to transport patient to [facility].

## 2020-11-29 NOTE — PROGRESS NOTES
Progress Note    2020   8:00 AM    Name:  Anika Fang  MRN:    [id number]      Day: 7     Admit Date: 2020 10:50 PM  PCP: JERRICA Lee CNP    Code Status:  Full Code    Subjective:     Patient denies any new symptoms. No chest pain or dyspnea. Physical Examination:      Vitals:  BP (!) 112/55   Pulse 70   Temp 98.2 °F (36.8 °C) (Oral)   Resp 17   Ht 6' (1.829 m)   Wt 197 lb 3.2 oz (89.4 kg)   SpO2 94%   BMI 26.75 kg/m²   Temp (24hrs), Av.3 °F (36.8 °C), Min:98.2 °F (36.8 °C), Max:98.6 °F (37 °C)      General appearance: alert, cooperative and no distress  Mental Status: oriented to person only. Pleasantly confused. Lungs: clear to auscultation bilaterally, normal effort  Heart: regular rate and rhythm, no murmur  Abdomen: soft, nontender, nondistended, bowel sounds present, no masses  Extremities: no edema, redness, tenderness in the calves  Skin: no gross lesions, rashes    Data:     Labs:  Recent Labs     20  0610 20  0558   WBC 9.3 9.4   HGB 15.5 15.4    370     Recent Labs     20  0610 20  0558   * 135   K 4.3 4.3   CL 99 99   CO2 20 16*   BUN 15 18   CREATININE 0.70 0.58*   GLUCOSE 391* 385*     Recent Labs     20  0610 20  0558   AST 11 15   ALT 13 18   BILITOT 0.6 0.7   ALKPHOS 60 60       Assessment and Plan:            Acute metabolic encephalopathy  Syncope  Fall  Possible pneumonia  L3-L4 spinal stenosis- severe   Urinary retention  Renal mass- concerning for RCC  New PAF- noted  during this admission     Plan    - MS is back to baseline   - converted to sinus. switch to xarelto. Dc lovenox. Added lopressor 25 bid. Cardiology on  - neuro is following. No CVA.    - NSGY seen for clifford stenosis, MRI noted, needs decompression as outpt  - renal mass possibly RCC, follow up with urology per Dr Ludger Domo   - [place] to stay for retention until eval by urology outpt  - needs [facility]      -See my discharge summary note. 11/27  -Patient is awaiting skilled nursing facility placement. He is medically stable. He is asymptomatic. He will need follow-up with neurosurgery and urology for lumbar spinal stenosis and renal mass concerning for RCC respectively. This is already mentioned discharge instruction    11/28  -Awaiting SNF placement. Medically stable to be discharged today. Discussed with case management.      11/29  - increased insulin as ordered given hyperglycemia   - resume meds  - SNF when bed is ready           DISCHARGE PLANNING  Pending placement       Electronically signed by Margarette Read DO on 11/29/2020 at 8:00 AM

## 2020-11-29 NOTE — CARE COORDINATION
RECEIVED CALL FROM [facility] AT [facility], PT HAS AUTH TO GO TO [facility] IF COVID IS NEGATIVE. DR MARGIE PORRAS SERVED FOR ORDERS. RN UPDATED. SPOKE TO WIFE, THUY, SECOND IMM REVIEWED AND CONSENT GIVEN. [facility] ARRANGED FOR 6PM.  [facility] AT [facility] ON PICKUP TIME.

## 2020-11-29 NOTE — PROGRESS NOTES
Progress Note  Patient: Christina Dowell  Unit/Bed: B006/Z465-57  YOB: 1950  MRN: [id number]  Acct: [de-identified]   Admitting Diagnosis: Syncope and collapse [R55]  Community acquired pneumonia of both lower lobes [J18.9]  Date:  11/21/2020  Hospital Day: 7    Chief Complaint:  Mental status changes A. fib    Subjective  Sitting in bed not very verbal.  Offers no complaints. Not on telemetry    Review of Systems:   Review of Systems   Unable to perform ROS: Mental status change         Physical Examination:    BP (!) 112/55   Pulse 70   Temp 98.2 °F (36.8 °C) (Oral)   Resp 17   Ht 6' (1.829 m)   Wt 197 lb 3.2 oz (89.4 kg)   SpO2 94%   BMI 26.75 kg/m²    Physical Exam  Neck:      Musculoskeletal: Normal range of motion. Cardiovascular:      Rate and Rhythm: Rhythm irregular. Pulmonary:      Effort: Pulmonary effort is normal.      Breath sounds: Normal breath sounds. Skin:     Coloration: Skin is pale.          LABS:  CBC:   Lab Results   Component Value Date    WBC 9.4 11/28/2020    RBC 4.92 11/28/2020    HGB 15.4 11/28/2020    HCT 45.4 11/28/2020    MCV 92.2 11/28/2020    MCH 31.2 11/28/2020    MCHC 33.8 11/28/2020    RDW 13.2 11/28/2020     11/28/2020    MPV 7.3 08/12/2012     CBC with Differential:   Lab Results   Component Value Date    WBC 9.4 11/28/2020    RBC 4.92 11/28/2020    HGB 15.4 11/28/2020    HCT 45.4 11/28/2020     11/28/2020    MCV 92.2 11/28/2020    MCH 31.2 11/28/2020    MCHC 33.8 11/28/2020    RDW 13.2 11/28/2020    LYMPHOPCT 15.5 11/28/2020    MONOPCT 6.4 11/28/2020    BASOPCT 1.1 11/28/2020    MONOSABS 0.6 11/28/2020    LYMPHSABS 1.5 11/28/2020    EOSABS 0.2 11/28/2020    BASOSABS 0.1 11/28/2020     CMP:    Lab Results   Component Value Date     11/28/2020    K 4.3 11/28/2020    CL 99 11/28/2020    CO2 16 11/28/2020    BUN 18 11/28/2020    CREATININE 0.58 11/28/2020    GFRAA >60.0 11/28/2020    LABGLOM >60.0 11/28/2020    GLUCOSE 385 11/28/2020 PROT 7.0 11/28/2020    LABALBU 3.4 11/28/2020    CALCIUM 10.1 11/28/2020    BILITOT 0.7 11/28/2020    ALKPHOS 60 11/28/2020    AST 15 11/28/2020    ALT 18 11/28/2020     BMP:    Lab Results   Component Value Date     11/28/2020    K 4.3 11/28/2020    CL 99 11/28/2020    CO2 16 11/28/2020    BUN 18 11/28/2020    LABALBU 3.4 11/28/2020    CREATININE 0.58 11/28/2020    CALCIUM 10.1 11/28/2020    GFRAA >60.0 11/28/2020    LABGLOM >60.0 11/28/2020    GLUCOSE 385 11/28/2020     Magnesium:    Lab Results   Component Value Date    MG 2.3 08/12/2012     Troponin:    Lab Results   Component Value Date    TROPONINI <0.010 11/24/2020       Radiology:  No results found. EKG:   Assessment:    Active Hospital Problems    Diagnosis Date Noted    Paroxysmal atrial fibrillation ([facility] [I48.0]     Neurogenic claudication due to lumbar spinal stenosis [M48.062] 11/24/2020    Hyperglycemia [R73.9]     Renal mass, left [N28.89]     Urinary retention [R33.9]     Spinal stenosis of lumbar region with neurogenic claudication [M48.062]     Weakness [R53.1]     Syncope and collapse [R55] 11/22/2020    Pneumonia [J18.9] 11/22/2020    Dementia ([facility] [F03.90] 11/22/2020    Community acquired pneumonia of both lower lobes [J18.9] 11/22/2020    Bipolar 1 disorder ([facility] [F31.9] 10/01/2015    Diabetes ([facility] [E11.9]            Plan:  1. Awaiting transfer to nursing home  2. Not on telemetry. On beta-blocker and Xarelto. For A. Fib.   Electronically signed by Shabnam Lopez MD on 11/29/2020 at 9:57 AM

## 2020-11-30 ENCOUNTER — OFFICE VISIT (OUTPATIENT)
Dept: GERIATRIC MEDICINE | Age: 70
End: 2020-11-30
Payer: MEDICARE

## 2020-11-30 VITALS
WEIGHT: 200.7 LBS | TEMPERATURE: 98.8 F | HEART RATE: 78 BPM | BODY MASS INDEX: 27.19 KG/M2 | HEIGHT: 72 IN | SYSTOLIC BLOOD PRESSURE: 102 MMHG | DIASTOLIC BLOOD PRESSURE: 56 MMHG | RESPIRATION RATE: 16 BRPM

## 2020-11-30 PROBLEM — E11.42 DIABETIC PERIPHERAL NEUROPATHY (HCC): Status: ACTIVE | Noted: 2020-11-30

## 2020-11-30 PROBLEM — F09 MILD COGNITIVE DISORDER: Status: ACTIVE | Noted: 2020-11-30

## 2020-11-30 PROBLEM — J18.9 COMMUNITY ACQUIRED PNEUMONIA OF BOTH LOWER LOBES: Status: RESOLVED | Noted: 2020-11-22 | Resolved: 2020-11-30

## 2020-11-30 PROBLEM — R55 SYNCOPE AND COLLAPSE: Status: RESOLVED | Noted: 2020-11-22 | Resolved: 2020-11-30

## 2020-11-30 PROBLEM — M21.6X9 ACQUIRED CAVUS DEFORMITY OF FOOT: Status: ACTIVE | Noted: 2020-11-30

## 2020-11-30 PROBLEM — M48.062 NEUROGENIC CLAUDICATION DUE TO LUMBAR SPINAL STENOSIS: Status: RESOLVED | Noted: 2020-11-24 | Resolved: 2020-11-30

## 2020-11-30 LAB — BLOOD CULTURE, ROUTINE: NORMAL

## 2020-11-30 PROCEDURE — 99305 1ST NF CARE MODERATE MDM 35: CPT | Performed by: INTERNAL MEDICINE

## 2020-11-30 ASSESSMENT — PATIENT HEALTH QUESTIONNAIRE - PHQ9
SUM OF ALL RESPONSES TO PHQ QUESTIONS 1-9: 0
1. LITTLE INTEREST OR PLEASURE IN DOING THINGS: 0
SUM OF ALL RESPONSES TO PHQ9 QUESTIONS 1 & 2: 0
2. FEELING DOWN, DEPRESSED OR HOPELESS: 0
SUM OF ALL RESPONSES TO PHQ QUESTIONS 1-9: 0
SUM OF ALL RESPONSES TO PHQ QUESTIONS 1-9: 0

## 2020-11-30 ASSESSMENT — ENCOUNTER SYMPTOMS
NAUSEA: 0
COLOR CHANGE: 0
ABDOMINAL PAIN: 0
EYE PAIN: 0
VOMITING: 0
COUGH: 0
TROUBLE SWALLOWING: 0
ABDOMINAL DISTENTION: 0
CHANGE IN BOWEL HABIT: 0
SHORTNESS OF BREATH: 0
CHEST TIGHTNESS: 0

## 2020-11-30 NOTE — PROGRESS NOTES
Physical Therapy  Facility/Department: [facility] MED SURG Y146/X967-47  Physical Therapy Discharge      NAME: Hari Estrada    : 1950 (65 y.o.)  MRN: [id number]    Account: [de-identified]  Gender: male      Patient has been discharged from acute care hospital. DC patient from current PT program.      Electronically signed by Elgin Key PT on 20 at 4:50 PM EST

## 2020-11-30 NOTE — PROGRESS NOTES
Francesca Perez is a 65 y.o. male former smoker, with history of hypertension, diabetes mellitus type 2, bipolar illness and mild cognitive impairment, whom I am seeing at [address] for initial evaluation for the admission of 11/29/2020, for rehabilitation, after [facility] admission from November 21 till November 29, with treatment for syncope, acute metabolic encephalopathy, urinary retention, new onset paroxysmal atrial fibrillation. The patient was seen with his/her consent in his/her room at the facility. I also spoke with the nurse and reviewed the hospital and nursing home records. Chief Complaint   Patient presents with    Follow-Up from [facility] Altered Mental Status    Extremity Weakness    Atrial Fibrillation       Brief hospitalization summary:    From the hospitalist's note:    68-year-old male who presented to the hospital with syncope and bilateral lower extremity weakness. Stroke was ruled out. He was seen by neurology. He was found to have L3-L4 spinal stenosis which was severe on MRI of the L-spine. Neurosurgery saw the patient. Patient will have decompression as outpatient. Patient was also noted to have renal mass concerning for renal cell carcinoma on MRI of the L-spine. Urology was consulted. He also had urinary retention. Urology will follow up with him as outpatient. During this admission, patient developed new onset A. fib with RVR. He was treated with IV Cardizem infusion. He converted to sinus. He was switched to beta-blockers. He was seen by cardiology. He will follow-up with cardiology as outpatient. He had PT/OT evaluation. Patient was discharged to SNF in a stable condition.       Interim history: Since admission to the nursing facility, the patient underwent PT/OT evaluation. No more syncopal episodes, no significant pain. Vital signs have been stable. Blood sugar today 146.  No specific concerns from the nursing staff.         Laboratory and imaging studies reports reviewed included (but were not limited to) the followin2020  6:58 AM - Benjie, Chpo Incoming Lab Results From Soft     Component  Value  Ref Range & Units  Status  Collected  Lab    WBC  9.4  4.8 - 10.8 K/uL  Final  2020  5:58 AM   - [facility]    RBC  4.92  4.70 - 6.10 M/uL  Final  2020  5:58 AM   - [facility]    Hemoglobin  15.4  14.0 - 18.0 g/dL  Final  2020  5:58 AM   - [facility]    Hematocrit  45.4  42.0 - 52.0 %  Final  2020  5:58 AM   - [facility]    MCV  92.2  80.0 - 100.0 fL  Final  2020  5:58 AM   - [facility]    MCH  31.2  27.0 - 31.3 pg  Final  2020  5:58 AM   - [facility]    MCHC  33.8  33.0 - 37.0 %  Final  2020  5:58 AM   - [facility]    RDW  13.2  11.5 - 14.5 %  Final  2020  5:58 AM   - [facility]    Platelets  325  450 - 400 K/uL  Final  2020  5:58 AM  [address] [facility]    Neutrophils %  74.4  %  Final  2020  5:58 AM   - [facility]    Lymphocytes %  15.5  %  Final  2020  5:58 AM  [address]    Monocytes %  6.4  %  Final  2020  5:58 AM  [address] [facility]    Eosinophils %  2.6  %  Final  2020  5:58 AM   - [facility]    Basophils %  1.1  %  Final  2020  5:58 AM   - [facility]    Neutrophils Absolute  7.0High    1.4 - 6.5 K/uL  Final  2020  5:58 AM   - [facility]    Lymphocytes Absolute  1.5  1.0 - 4.8 K/uL  Final  2020  5:58 AM   - [facility]    Monocytes Absolute  0.6  0.2 - 0.8 K/uL  Final  2020  5:58 AM  [facility]    Eosinophils Absolute  0.2  0.0 - 0.7 K/uL  Final  2020  5:58 AM  [facility]    Basophils Absolute  0.1  0.0 - 0.2 K/uL  Final  2020  5:58 AM  [address] [facility]   2020  7:53 AM - [facility] Lab Results From Soft     Component  Value  Ref Range & Units  Status  Collected  Lab    Sodium  135  135 - 144 mEq/L  Final  11/28/2020  5:58 AM  [address] [facility]    Potassium reflex Magnesium  4.3  3.4 - 4.9 mEq/L  Final  11/28/2020  5:58 AM  [facility]    Chloride  99  95 - 107 mEq/L  Final  11/28/2020  5:58 AM  [address] [facility]    CO2  16Low    20 - 31 mEq/L  Final  11/28/2020  5:58 AM  [facility]    Anion Gap  20High    9 - 15 mEq/L  Final  11/28/2020  5:58 AM  [facility]    Glucose  385High    70 - 99 mg/dL  Final  11/28/2020  5:58 AM  [address] [facility]    BUN  18  8 - 23 mg/dL  Final  11/28/2020  5:58 AM  [place] [address]  0.58Low    0.70 - 1.20 mg/dL  Final  11/28/2020  5:58 AM  [facility]    GFR Non-  >60.0  >60  Final  11/28/2020  5:58 AM  [facility]    >60 mL/min/1.73m2 EGFR, calc. for ages 25 and older using the   MDRD formula (not corrected for weight), is valid for stable   renal function. GFR   >60.0  >60  Final  11/28/2020  5:58 AM  [facility]    >60 mL/min/1.73m2 EGFR, calc. for ages 25 and older using the   MDRD formula (not corrected for weight), is valid for stable   renal function. Calcium  10. 1High    8.5 - 9.9 mg/dL  Final  11/28/2020  5:58 AM  [facility]    Total Protein  7.0  6.3 - 8.0 g/dL  Final  11/28/2020  5:58 AM  [facility]    Alb  3.4Low    3.5 - 4.6 g/dL  Final  11/28/2020  5:58 AM  [facility]    Total Bilirubin  0.7  0.2 - 0.7 mg/dL  Final  11/28/2020  5:58 AM  [facility]    Alkaline Phosphatase  60  35 - 104 U/L  Final  11/28/2020  5:58 AM  [address] [facility]    ALT  18  0 - 41 U/L  Final  11/28/2020  5:58 AM   - [facility]    AST  15  0 - 40 U/L  Final  11/28/2020  5:58 AM  [facility]    Globulin  3. 6High    2.3 - 3.5 g/dL  Final 11/28/2020  5:58 AM  [facility] -      EXAMINATION: MRI BRAIN WO CONTRAST 11/25/2020        CLINICAL HISTORY:  encephalopathy, new onset afib         COMPARISONS: Brain MRI from August 12, 2012         TECHNIQUE:         Multiplanar multisequence images of the brain were obtained without contrast. Diffusion perfusion imaging was obtained.         FINDINGS:    This study is partly limited due to motion artifact.     There are no extra-axial collections. There is no evidence of hemorrhage. There are no areas of signal abnormality on perfusion diffusion imaging to suggest ischemia. The susceptibility images do not demonstrate evidence of hemosiderin deposition within     the brain parenchyma or the leptomeninges.         There is preservation of the gray-white matter differentiation. There are no areas of signal abnormality within the brain parenchyma or the posterior fossa to suggest lesion. There is prominence of the sulci and ventricles consistent with moderate global     cerebral atrophy and chronic involutional changes. The midline structures are intact, the corpus callosum is within normal limits.  The region of the pineal gland and the sella turcica are unremarkable.         There are no space-occupying lesions in the posterior fossa. The basilar cisterns are patent. The craniocervical junction is unremarkable.         The visualized portions of the orbits are within normal limits, the globes are intact. The visualized portions of the paranasal sinuses are within normal limits.         The calvarium and soft tissues are unremarkable.              Impression    There are no acute intracranial changes, no evidence of ischemia or hemorrhage.  There are no regions of signal abnormality.           EXAM:  CT UROGRAM         History: Renal mass         Technique: Multiple contiguous axial images were obtained of the abdomen and pelvis from an level of the lung bases through the ischial tuberosities without and with contrast. Multiplanar reformats were obtained. Delayed images were obtained.         Comparison: MRI of the lumbar spine performed earlier on the same date and MRI of the lumbar spine from August 12, 2012         Findings:         Small left pleural effusion. Minimal bibasilar atelectasis. Heart size is at the upper limits of normal. Small to moderate pericardial effusion.         The gallbladder is partially contracted but otherwise unremarkable. The liver, spleen, stomach, pancreas, and left adrenal gland are within normal limits. A 2 cm right adrenal lesion is not significant changed from MRI of the lumbar spine from August 12, 2012 and is most likely an adenoma.         Precontrast images demonstrate no urinary tract calculi. Postcontrast images demonstrate a heterogenous enhancing left renal lesion measuring approximately 6 cm in AP dimension by 6 cm in transverse dimension by 5 cm in craniocaudal dimension. This    lesion contains multiple small calcifications. No left renal vein thrombosis identified. 2.7 cm left renal cyst and 1.5 cm right renal cyst. The urinary bladder is decompressed around a Polo catheter and demonstrates circumferential wall thickening. Air     is present within urinary bladder, likely secondary to Polo catheter.         Abdominal aorta is nonaneurysmal  and demonstrates atherosclerotic calcification . No retroperitoneal or abdominal/pelvic lymphadenopathy.         No small bowel obstruction. No overt colonic mass or pericolonic inflammation. Appendix is within normal limits. No free fluid or free air.         No acute osseous abnormality. Degenerative changes of the spine. No osteolytic or osteoblastic lesion identified.              Impression         6 x 6 x 5 cm left renal mass is considered renal cell carcinoma until proven otherwise. No left renal vein thrombosis.         Circumferential wall thickening of the urinary bladder.  Correlation with urinalysis recommended to exclude cystitis.         Small to moderate pericardial effusion.         Small left pleural effusion.              All CT scans at this facility use dose modulation, iterative reconstruction, and/or weight based dosing when appropriate to reduce radiation dose to as low as reasonably achievable.           EXAMINATION: MRI LUMBAR SPINE WO CONTRAST 11/23/2020        CLINICAL HISTORY:  lumbar stenosis         COMPARISONS: Lumbar spine MRI from August 12, 2012         TECHNIQUE:         Multiplanar multisequence images of the lumbar spine were obtained without contrast.         FINDINGS:          The spine is in anatomic alignment.          There is no acute fracture. There is preservation of the vertebral body heights. The intervertebral discs are unremarkable from L1 to L3. There is disassociation moderate disc space narrowing with fissures at L3-4, L4-5, and L5-S1. The presence of    annular fissures may be a sensitive pain generator at these levels. . There is a vertebral body hemangioma at L4 there is mild increased marrow signal involving the inferior articular surfaces of L5 which may indicate edema and may also be a specific pain     generator.          The distal cord and conus medullaris are within normal limits.  The cauda equina is unremarkable.         The retroperitoneal structures demonstrate a well-defined 5 cm inhomogeneous mass arising from the inferior pole of the left kidney which is worrisome for a renal cell carcinoma.         T12-L1: There is no disc herniation, central canal narrowing, or neural foraminal narrowing.         L1-2: There is no disc herniation, central canal narrowing, or neural foraminal narrowing.           L2-3: There is no disc herniation, central canal narrowing, or neural foraminal narrowing.  There is mild bilateral facet arthrosis.         L3-4: There is a 4 mm asymmetric disc bulge as well as moderate bilateral facet arthrosis and moderate ligamentum flavum hypertrophy producing severe narrowing of the central canal. AP diameter of the thecal sac is 6 mm. There is moderate right and mild    left neural foraminal narrowing.           L4-5: There is a 3 mm symmetric disc bulges all areas mild bilateral facet arthrosis and mild ligamentum flavum hypertrophy with mild narrowing of the central canal and mild bilateral neural foraminal narrowing.           L5-S1: There is a 5 mm symmetric disc bulge as well as its mild to moderate left facet arthrosis without ligamentum flavum hypertrophy. There is mild narrowing of central canal. There is mild right and moderate left neural foraminal narrowing.                     Impression    1. There is no acute fracture or subluxation. The distal cord, conus medullaris, and cauda equina are within normal limits. 2. There is a 5 cm well-circumscribed inhomogeneous mass arising from the inferior pole of the left kidney which is worrisome for renal cell carcinoma. Dedicated renal imaging with CT urogram or renal MRI should be considered. 3. There is multilevel spondylosis from L3 to S1 with disc desiccation, disc space narrowing, and annular fissures which may be specifically generators at these levels. There is mild edema involving the articular surfaces at L5-S1 which may also be a    specific pain generator. 4. At L3-4 there is severe central canal narrowing with AP diameter of thecal sac of 6 mm and a multifactorial basis. There is moderate right and mild left neural foraminal narrowing. The remaining levels do not demonstrate significant central canal    narrowing or foraminal narrowing. 5. At L3-4 there is moderate bilateral facet arthrosis.  This may correlate with facet syndrome, back pain radiating inferiorly to the buttocks and posterior thighs.  In the appropriate clinical setting these may be potential targets for image guided    therapeutic injections.                      HPI:    More detail above in the chiefcomplaint(s), interim history and below in the review of systems. Altered Mental Status   The current episode started 1 to 4 weeks ago. The problem has been resolved. Associated symptoms include urinary symptoms and weakness. Pertinent negatives include no abdominal pain, change in bowel habit, chest pain, chills, congestion, coughing, fatigue, fever, headaches, myalgias, nausea or vomiting. The treatment provided significant relief. Extremity Weakness   This is a new problem. The current episode started 1 to 4 weeks ago. The problem occurs constantly. The problem has been unchanged. Associated symptoms include urinary symptoms and weakness. Pertinent negatives include no abdominal pain, change in bowel habit, chest pain, chills, congestion, coughing, fatigue, fever, headaches, myalgias, nausea or vomiting. The symptoms are aggravated by standing and walking. The treatment provided no relief.          Past Medical History:   Diagnosis Date    Adrenal adenoma, right 11/2020    Bipolar 1 disorder ([facility] [address]     Cancer of skin of left ear     cured    History of CVA (cerebrovascular accident)     History of tracheostomy     Hyperlipidemia     Hypertension     Left renal mass 11/2020    MCI (mild cognitive impairment) 2019    Uncontrolled diabetes mellitus (HCC)        Past Surgical History:   Procedure Laterality Date    AV FISTULA REPAIR      COLONOSCOPY  10/21/15    w/polypectomy     HERNIA REPAIR      TONSILLECTOMY      TRACHEOTOMY         Social History     Socioeconomic History    Marital status:      Spouse name: Not on file    Number of children: Not on file    Years of education: Not on file    Highest education level: Not on file   Occupational History    Not on file   Social Needs    Financial resource strain: Not on file    Food insecurity     Worry: Not on file     Inability: Not on file    Transportation needs     Medical: Not on file     Non-medical: Not on file   Tobacco Use    Smoking status: Former Smoker    Smokeless tobacco: Never Used   Substance and Sexual Activity    Alcohol use: No     Alcohol/week: 0.0 standard drinks    Drug use: No    Sexual activity: Not on file   Lifestyle    Physical activity     Days per week: Not on file     Minutes per session: Not on file    Stress: Not on file   Relationships    Social connections     Talks on phone: Not on file     Gets together: Not on file     Attends Synagogue service: Not on file     Active member of club or organization: Not on file     Attends meetings of clubs or organizations: Not on file     Relationship status: Not on file    Intimate partner violence     Fear of current or ex partner: Not on file     Emotionally abused: Not on file     Physically abused: Not on file     Forced sexual activity: Not on file   Other Topics Concern    Not on file   Social History Narrative    Born [place], had 2 siblings, they both passed        One daughter in [facility] 32 for the [employer] x 45 years       Family History   Problem Relation Age of Onset    Diabetes Mother     Heart Disease Mother     Stroke Father     Heart Disease Father     Cancer Brother     Stroke Sister        Family and socialhistory were reviewed and pertinent changes documented.     ALLERGIES    Pioglitazone    Current Outpatient Medications on File Prior to Visit   Medication Sig Dispense Refill    insulin glargine (LANTUS) 100 UNIT/ML injection vial Inject 6 Units into the skin 2 times daily 1 vial 3    rivaroxaban (XARELTO) 20 MG TABS tablet Take 1 tablet by mouth daily 30 tablet 0    metoprolol tartrate (LOPRESSOR) 50 MG tablet Take 1 tablet by mouth every 8 hours 60 tablet 3    digoxin (LANOXIN) 125 MCG tablet Take 1 tablet by mouth daily 30 tablet 3    tamsulosin (FLOMAX) 0.4 MG capsule Take 1 capsule by mouth daily 30 capsule 3    cefUROXime (CEFTIN) 500 MG tablet Take 1 tablet by mouth 2 times daily for 5 days 10 tablet 0    gabapentin (NEURONTIN) 300 MG capsule Take 300 mg by mouth 2 times daily.  memantine (NAMENDA) 5 MG tablet Take 5 mg by mouth 2 times daily      alogliptin (NESINA) 25 MG TABS tablet Take 25 mg by mouth daily      SIMVASTATIN PO Take by mouth      aspirin 81 MG tablet Take 81 mg by mouth daily       No current facility-administered medications on file prior to visit. Review of Systems   Constitutional: Negative for appetite change, chills, fatigue, fever and unexpected weight change. HENT: Negative for congestion, nosebleeds and trouble swallowing. Eyes: Negative for pain and visual disturbance. Respiratory: Negative for cough, chest tightness and shortness of breath. Cardiovascular: Negative for chest pain, palpitations and leg swelling. Gastrointestinal: Negative for abdominal distention, abdominal pain, change in bowel habit, nausea and vomiting. Endocrine: Positive for polydipsia and polyuria. Negative for cold intolerance and heat intolerance. Genitourinary: Positive for difficulty urinating. Negative for hematuria. Musculoskeletal: Positive for gait problem. Negative for myalgias. Skin: Negative for color change. Neurological: Positive for speech difficulty and weakness. Negative for dizziness, tremors, light-headedness and headaches. Hematological: Does not bruise/bleed easily. Psychiatric/Behavioral: Positive for decreased concentration. Negative for dysphoric mood. The patient is not nervous/anxious. Vitals:    11/30/20 1503   BP: (!) 102/56   Pulse: 78   Resp: 16   Temp: 98.8 °F (37.1 °C)   Weight: 200 lb 11.2 oz (91 kg)   Height: 6' (1.829 m)       Physical Exam  Constitutional:       Appearance: He is not ill-appearing. Comments: Sitting up in the recliner, looks older than actual age, pleasant, no distress   HENT:      Head: Normocephalic and atraumatic. Nose: No rhinorrhea. Mouth/Throat:      Mouth: Mucous membranes are dry. Dentition: Abnormal dentition (several teeths are missing). Tongue: Tongue does not deviate from midline. Eyes:      General: No scleral icterus. Extraocular Movements: Extraocular movements intact. Neck:      Musculoskeletal: No neck rigidity or muscular tenderness. Cardiovascular:      Rate and Rhythm: Normal rate and regular rhythm. Pulses:           Radial pulses are 2+ on the right side and 2+ on the left side. Posterior tibial pulses are 1+ on the right side and 1+ on the left side. Heart sounds: Heart sounds are distant. No gallop. Abdominal:      General: Bowel sounds are decreased. There is no distension. Palpations: Abdomen is soft. Tenderness: There is no abdominal tenderness. There is no right CVA tenderness, left CVA tenderness or guarding. Musculoskeletal:         General: No deformity. Right lower leg: No edema. Left lower leg: No edema. Skin:     General: Skin is warm and dry. Neurological:      General: No focal deficit present. Mental Status: He is alert and oriented to person, place, and time. Coordination: Coordination normal.      Comments: Appears hard of hearing and with moderate dysarthria. Memory recall appears moderately impaired, especially long-term. Is unable to  and answer call on his cell phone. Able to move all 4 extremities with equal strength. For evaluation of stance, gait, balance, functional mobility, please refer to the PT/OT notes   Psychiatric:         Attention and Perception: He is attentive. Mood and Affect: Mood is not anxious or depressed. Affect is flat. Affect is not labile. Speech: Speech is slurred. Behavior: Behavior is slowed. Behavior is not withdrawn. Behavior is cooperative. Thought Content: Thought content is not delusional.         Cognition and Memory: Memory is impaired.        Ginger Barclay was seen today for follow-up from [facility] mental status, extremity weakness and atrial fibrillation. Diagnoses and all orders for this visit:        Spinal stenosis of lumbar region, unspecified whether neurogenic claudication present                Severe, has follow-up appointment in 2 weeks with neurosurgeon to discuss surgery. Continue PT/OT as tolerated in the nursing facility. Weakness of both lower extremities                From spinal stenosis and possible diabetic peripheral neuropathy, continue PT/OT as tolerated in the nursing facility. Impaired mobility and activities of daily living    Mental confusion                 Acute encephalopathy resolved. Underlying MCI, continue Namenda. Underlying bipolar illness in remission. Mass of left kidney                  Follow-up with urology in 2 weeks to discuss treatment  for possible renal cell carcinoma. PAF (paroxysmal atrial fibrillation) (HCC)                  In sinus rhythm and asymptomatic at this time. Continue beta-blocker, oral anticoagulation. Since surgery is being planned, will have to transition to short-acting anticoagulant the appropriate time. Other orders  -     metFORMIN (GLUCOPHAGE) 1000 MG tablet; Take 1 tablet by mouth 2 times daily (with meals)          Plan:    See all orders and comments in the assessment section. Reviewed with the patient/nurse today's diagnosis and associated problems, treatment plans, prognosis, questions answered. The current medical regimen is effective;  continue present plan and medications. Orders for repeat laboratories placed in the nursing home chart.          Orders Placed This Encounter   Medications    metFORMIN (GLUCOPHAGE) 1000 MG tablet     Sig: Take 1 tablet by mouth 2 times daily (with meals)     Dispense:  60 tablet     Refill:  0       Close follow up needed in one week with CNP or with MD.       I have reviewed the patient's medical and surgical, family and social history, health maintenance schedule, and updated the computerized patient record. Please note this report has been partially produced by using speech recognition hardware. It may contain errors related to the system, including grammar, punctuation and spelling as well as words and phrases that may seem inaccurate. For anyquestions or concerns, please feel free to contact me for clarification.         Electronically signed by Gerald Eagle MD

## 2020-12-01 ENCOUNTER — OFFICE VISIT (OUTPATIENT)
Dept: GERIATRIC MEDICINE | Age: 70
End: 2020-12-01
Payer: MEDICARE

## 2020-12-01 DIAGNOSIS — I48.0 PAF (PAROXYSMAL ATRIAL FIBRILLATION) (HCC): ICD-10-CM

## 2020-12-01 DIAGNOSIS — E11.9 TYPE 2 DIABETES MELLITUS WITHOUT COMPLICATION, WITH LONG-TERM CURRENT USE OF INSULIN (HCC): ICD-10-CM

## 2020-12-01 DIAGNOSIS — J18.9 PNEUMONIA DUE TO INFECTIOUS ORGANISM, UNSPECIFIED LATERALITY, UNSPECIFIED PART OF LUNG: ICD-10-CM
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
## 2020-12-01 DIAGNOSIS — Z79.4 TYPE 2 DIABETES MELLITUS WITHOUT COMPLICATION, WITH LONG-TERM CURRENT USE OF INSULIN (HCC): ICD-10-CM

## 2020-12-01 DIAGNOSIS — F09 MILD COGNITIVE DISORDER: ICD-10-CM

## 2020-12-01 DIAGNOSIS — R26.2 DIFFICULTY IN WALKING: ICD-10-CM

## 2020-12-01 DIAGNOSIS — F03.90 DEMENTIA WITHOUT BEHAVIORAL DISTURBANCE, UNSPECIFIED DEMENTIA TYPE: ICD-10-CM

## 2020-12-01 DIAGNOSIS — R29.898 WEAKNESS OF BOTH LOWER EXTREMITIES: Primary | ICD-10-CM

## 2020-12-01 PROCEDURE — 99309 SBSQ NF CARE MODERATE MDM 30: CPT | Performed by: NURSE PRACTITIONER

## 2020-12-01 NOTE — CARE COORDINATION
Social work note: Follow up note. [id number] completed for [facility].  Electronically signed by BELEN Arnett on 12/1/2020 at 8:55 AM

## 2020-12-07 ENCOUNTER — OFFICE VISIT (OUTPATIENT)
Dept: GERIATRIC MEDICINE | Age: 70
End: 2020-12-07
Payer: MEDICARE

## 2020-12-07 LAB
ANION GAP SERPL CALCULATED.3IONS-SCNC: 14 MEQ/L (ref 9–15)
BUN BLDV-MCNC: 10 MG/DL (ref 8–23)
CALCIUM SERPL-MCNC: 8.8 MG/DL (ref 8.5–9.9)
CHLORIDE BLD-SCNC: 97 MEQ/L (ref 95–107)
CO2: 19 MEQ/L (ref 20–31)
CREAT SERPL-MCNC: 0.62 MG/DL (ref 0.7–1.2)
GFR AFRICAN AMERICAN: >60
GFR NON-AFRICAN AMERICAN: >60
GLUCOSE BLD-MCNC: 190 MG/DL (ref 70–99)
HCT VFR BLD CALC: 38.8 % (ref 42–52)
HEMOGLOBIN: 13.4 G/DL (ref 14–18)
MCH RBC QN AUTO: 31.4 PG (ref 27–31.3)
MCHC RBC AUTO-ENTMCNC: 34.4 % (ref 33–37)
MCV RBC AUTO: 91.2 FL (ref 80–100)
PDW BLD-RTO: 13.2 % (ref 11.5–14.5)
PLATELET # BLD: 348 K/UL (ref 130–400)
POTASSIUM SERPL-SCNC: 4.1 MEQ/L (ref 3.4–4.9)
RBC # BLD: 4.26 M/UL (ref 4.7–6.1)
SODIUM BLD-SCNC: 130 MEQ/L (ref 135–144)
WBC # BLD: 11.3 K/UL (ref 4.8–10.8)

## 2020-12-07 PROCEDURE — 99309 SBSQ NF CARE MODERATE MDM 30: CPT | Performed by: INTERNAL MEDICINE

## 2020-12-08 LAB
ANION GAP SERPL CALCULATED.3IONS-SCNC: 14 MEQ/L (ref 9–15)
BUN BLDV-MCNC: 14 MG/DL (ref 8–23)
CALCIUM SERPL-MCNC: 9 MG/DL (ref 8.5–9.9)
CHLORIDE BLD-SCNC: 95 MEQ/L (ref 95–107)
CO2: 23 MEQ/L (ref 20–31)
CREAT SERPL-MCNC: 0.7 MG/DL (ref 0.7–1.2)
DIGOXIN LEVEL: 0.9 NG/ML (ref 0.8–2)
GFR AFRICAN AMERICAN: >60
GFR NON-AFRICAN AMERICAN: >60
GLUCOSE BLD-MCNC: 247 MG/DL (ref 70–99)
HBA1C MFR BLD: 10.2 % (ref 4.8–5.9)
HCT VFR BLD CALC: 38.6 % (ref 42–52)
HEMOGLOBIN: 13 G/DL (ref 14–18)
MCH RBC QN AUTO: 30.7 PG (ref 27–31.3)
MCHC RBC AUTO-ENTMCNC: 33.5 % (ref 33–37)
MCV RBC AUTO: 91.5 FL (ref 80–100)
PDW BLD-RTO: 13.4 % (ref 11.5–14.5)
PLATELET # BLD: 348 K/UL (ref 130–400)
POTASSIUM SERPL-SCNC: 4 MEQ/L (ref 3.4–4.9)
RBC # BLD: 4.22 M/UL (ref 4.7–6.1)
SODIUM BLD-SCNC: 132 MEQ/L (ref 135–144)
WBC # BLD: 10.5 K/UL (ref 4.8–10.8)

## 2020-12-09 ENCOUNTER — APPOINTMENT (OUTPATIENT)
Dept: GENERAL RADIOLOGY | Age: 70
DRG: 871 | End: 2020-12-09
Payer: MEDICARE

## 2020-12-09 ENCOUNTER — HOSPITAL ENCOUNTER (INPATIENT)
Age: 70
LOS: 6 days | Discharge: NURSING, REHAB OR LONG-TERM CARE | DRG: 871 | End: 2020-12-16
Attending: INTERNAL MEDICINE | Admitting: INTERNAL MEDICINE
Payer: MEDICARE

## 2020-12-09 ENCOUNTER — HOSPITAL ENCOUNTER (EMERGENCY)
Age: 70
Discharge: NURSING, REHAB OR LONG-TERM CARE | DRG: 871 | End: 2020-12-09
Payer: MEDICARE

## 2020-12-09 VITALS
TEMPERATURE: 98.6 F | HEART RATE: 77 BPM | RESPIRATION RATE: 18 BRPM | WEIGHT: 219 LBS | DIASTOLIC BLOOD PRESSURE: 71 MMHG | SYSTOLIC BLOOD PRESSURE: 125 MMHG | OXYGEN SATURATION: 99 % | BODY MASS INDEX: 29.66 KG/M2 | HEIGHT: 72 IN

## 2020-12-09 LAB
ALBUMIN SERPL-MCNC: 3.4 G/DL (ref 3.5–4.6)
ALP BLD-CCNC: 73 U/L (ref 35–104)
ALT SERPL-CCNC: 25 U/L (ref 0–41)
ANION GAP SERPL CALCULATED.3IONS-SCNC: 12 MEQ/L (ref 9–15)
AST SERPL-CCNC: 31 U/L (ref 0–40)
BACTERIA: NEGATIVE /HPF
BASOPHILS ABSOLUTE: 0 K/UL (ref 0–0.2)
BASOPHILS ABSOLUTE: 0 K/UL (ref 0–0.2)
BASOPHILS RELATIVE PERCENT: 0.2 %
BASOPHILS RELATIVE PERCENT: 0.4 %
BILIRUB SERPL-MCNC: 0.4 MG/DL (ref 0.2–0.7)
BILIRUBIN URINE: ABNORMAL
BLOOD, URINE: ABNORMAL
BUN BLDV-MCNC: 16 MG/DL (ref 8–23)
CALCIUM SERPL-MCNC: 9.3 MG/DL (ref 8.5–9.9)
CHLORIDE BLD-SCNC: 96 MEQ/L (ref 95–107)
CLARITY: ABNORMAL
CO2: 24 MEQ/L (ref 20–31)
COLOR: ABNORMAL
CREAT SERPL-MCNC: 0.9 MG/DL (ref 0.7–1.2)
EKG ATRIAL RATE: 68 BPM
EKG P AXIS: 33 DEGREES
EKG P-R INTERVAL: 134 MS
EKG Q-T INTERVAL: 388 MS
EKG QRS DURATION: 100 MS
EKG QTC CALCULATION (BAZETT): 412 MS
EKG R AXIS: 34 DEGREES
EKG T AXIS: -35 DEGREES
EKG VENTRICULAR RATE: 68 BPM
EOSINOPHILS ABSOLUTE: 0 K/UL (ref 0–0.7)
EOSINOPHILS ABSOLUTE: 0 K/UL (ref 0–0.7)
EOSINOPHILS RELATIVE PERCENT: 0.1 %
EOSINOPHILS RELATIVE PERCENT: 0.4 %
EPITHELIAL CELLS, UA: ABNORMAL /HPF (ref 0–5)
GFR AFRICAN AMERICAN: >60
GFR NON-AFRICAN AMERICAN: >60
GLOBULIN: 3.5 G/DL (ref 2.3–3.5)
GLUCOSE BLD-MCNC: 200 MG/DL (ref 70–99)
GLUCOSE URINE: 250 MG/DL
HCT VFR BLD CALC: 38.3 % (ref 42–52)
HCT VFR BLD CALC: 39.6 % (ref 42–52)
HEMOGLOBIN: 13 G/DL (ref 14–18)
HEMOGLOBIN: 13.5 G/DL (ref 14–18)
KETONES, URINE: NEGATIVE MG/DL
LEUKOCYTE ESTERASE, URINE: ABNORMAL
LYMPHOCYTES ABSOLUTE: 0.6 K/UL (ref 1–4.8)
LYMPHOCYTES ABSOLUTE: 0.7 K/UL (ref 1–4.8)
LYMPHOCYTES RELATIVE PERCENT: 8 %
LYMPHOCYTES RELATIVE PERCENT: 9.4 %
MCH RBC QN AUTO: 30.8 PG (ref 27–31.3)
MCH RBC QN AUTO: 30.9 PG (ref 27–31.3)
MCHC RBC AUTO-ENTMCNC: 34.1 % (ref 33–37)
MCHC RBC AUTO-ENTMCNC: 34.1 % (ref 33–37)
MCV RBC AUTO: 90.2 FL (ref 80–100)
MCV RBC AUTO: 90.7 FL (ref 80–100)
MONOCYTES ABSOLUTE: 0.5 K/UL (ref 0.2–0.8)
MONOCYTES ABSOLUTE: 0.6 K/UL (ref 0.2–0.8)
MONOCYTES RELATIVE PERCENT: 6.8 %
MONOCYTES RELATIVE PERCENT: 7.6 %
NEUTROPHILS ABSOLUTE: 6.2 K/UL (ref 1.4–6.5)
NEUTROPHILS ABSOLUTE: 6.3 K/UL (ref 1.4–6.5)
NEUTROPHILS RELATIVE PERCENT: 82.2 %
NEUTROPHILS RELATIVE PERCENT: 84.9 %
NITRITE, URINE: POSITIVE
PDW BLD-RTO: 12.9 % (ref 11.5–14.5)
PDW BLD-RTO: 13.3 % (ref 11.5–14.5)
PH UA: 5 (ref 5–9)
PLATELET # BLD: 355 K/UL (ref 130–400)
PLATELET # BLD: 356 K/UL (ref 130–400)
POTASSIUM SERPL-SCNC: 4.3 MEQ/L (ref 3.4–4.9)
PROTEIN UA: >=300 MG/DL
RBC # BLD: 4.22 M/UL (ref 4.7–6.1)
RBC # BLD: 4.39 M/UL (ref 4.7–6.1)
RBC UA: >100 /HPF (ref 0–2)
SODIUM BLD-SCNC: 132 MEQ/L (ref 135–144)
SPECIFIC GRAVITY UA: 1.02 (ref 1–1.03)
TOTAL PROTEIN: 6.9 G/DL (ref 6.3–8)
URINE REFLEX TO CULTURE: YES
UROBILINOGEN, URINE: 0.2 E.U./DL
WBC # BLD: 7.4 K/UL (ref 4.8–10.8)
WBC # BLD: 7.5 K/UL (ref 4.8–10.8)
WBC UA: >100 /HPF (ref 0–5)

## 2020-12-09 PROCEDURE — 6360000002 HC RX W HCPCS: Performed by: NURSE PRACTITIONER

## 2020-12-09 PROCEDURE — 71045 X-RAY EXAM CHEST 1 VIEW: CPT

## 2020-12-09 PROCEDURE — 87077 CULTURE AEROBIC IDENTIFY: CPT

## 2020-12-09 PROCEDURE — 51702 INSERT TEMP BLADDER CATH: CPT

## 2020-12-09 PROCEDURE — 81001 URINALYSIS AUTO W/SCOPE: CPT

## 2020-12-09 PROCEDURE — 93005 ELECTROCARDIOGRAM TRACING: CPT | Performed by: NURSE PRACTITIONER

## 2020-12-09 PROCEDURE — 96365 THER/PROPH/DIAG IV INF INIT: CPT

## 2020-12-09 PROCEDURE — 87086 URINE CULTURE/COLONY COUNT: CPT

## 2020-12-09 PROCEDURE — 85025 COMPLETE CBC W/AUTO DIFF WBC: CPT

## 2020-12-09 PROCEDURE — 51798 US URINE CAPACITY MEASURE: CPT

## 2020-12-09 PROCEDURE — 83735 ASSAY OF MAGNESIUM: CPT

## 2020-12-09 PROCEDURE — 87040 BLOOD CULTURE FOR BACTERIA: CPT

## 2020-12-09 PROCEDURE — 87186 SC STD MICRODIL/AGAR DIL: CPT

## 2020-12-09 PROCEDURE — 99285 EMERGENCY DEPT VISIT HI MDM: CPT

## 2020-12-09 PROCEDURE — 2580000003 HC RX 258: Performed by: PHYSICIAN ASSISTANT

## 2020-12-09 PROCEDURE — XW033E5 INTRODUCTION OF REMDESIVIR ANTI-INFECTIVE INTO PERIPHERAL VEIN, PERCUTANEOUS APPROACH, NEW TECHNOLOGY GROUP 5: ICD-10-PCS | Performed by: INTERNAL MEDICINE

## 2020-12-09 PROCEDURE — 83605 ASSAY OF LACTIC ACID: CPT

## 2020-12-09 PROCEDURE — 99283 EMERGENCY DEPT VISIT LOW MDM: CPT

## 2020-12-09 PROCEDURE — 84145 PROCALCITONIN (PCT): CPT

## 2020-12-09 PROCEDURE — 2580000003 HC RX 258: Performed by: NURSE PRACTITIONER

## 2020-12-09 PROCEDURE — 36415 COLL VENOUS BLD VENIPUNCTURE: CPT

## 2020-12-09 PROCEDURE — 80053 COMPREHEN METABOLIC PANEL: CPT

## 2020-12-09 RX ORDER — TAMSULOSIN HYDROCHLORIDE 0.4 MG/1
0.4 CAPSULE ORAL DAILY
Qty: 10 CAPSULE | Refills: 0 | Status: ON HOLD | OUTPATIENT
Start: 2020-12-09 | End: 2020-12-14 | Stop reason: HOSPADM

## 2020-12-09 RX ORDER — 0.9 % SODIUM CHLORIDE 0.9 %
1000 INTRAVENOUS SOLUTION INTRAVENOUS ONCE
Status: COMPLETED | OUTPATIENT
Start: 2020-12-09 | End: 2020-12-10

## 2020-12-09 RX ORDER — SODIUM CHLORIDE 9 MG/ML
INJECTION, SOLUTION INTRAVENOUS
Status: DISPENSED
Start: 2020-12-09 | End: 2020-12-10

## 2020-12-09 RX ORDER — SULFAMETHOXAZOLE AND TRIMETHOPRIM 800; 160 MG/1; MG/1
1 TABLET ORAL 2 TIMES DAILY
Qty: 14 TABLET | Refills: 0 | Status: ON HOLD | OUTPATIENT
Start: 2020-12-09 | End: 2020-12-14 | Stop reason: HOSPADM

## 2020-12-09 RX ADMIN — CEFTRIAXONE SODIUM 1 G: 1 INJECTION, POWDER, FOR SOLUTION INTRAMUSCULAR; INTRAVENOUS at 18:00

## 2020-12-09 RX ADMIN — SODIUM CHLORIDE 1000 ML: 9 INJECTION, SOLUTION INTRAVENOUS at 23:37

## 2020-12-09 ASSESSMENT — ENCOUNTER SYMPTOMS
TROUBLE SWALLOWING: 0
RHINORRHEA: 0
EYE PAIN: 0
SORE THROAT: 0
BACK PAIN: 0
COUGH: 0
NAUSEA: 0
ABDOMINAL PAIN: 0
VOMITING: 0
EYE DISCHARGE: 0
BLOOD IN STOOL: 0
CONSTIPATION: 0
SHORTNESS OF BREATH: 0
WHEEZING: 0
COLOR CHANGE: 0
DIARRHEA: 0
EYE REDNESS: 0

## 2020-12-09 NOTE — ED TRIAGE NOTES
Pt in from [facility], because chronic urinary catheter came out. Pt stated \"it just popped out\". Pt is A&Ox2, skin intact, afebrile, breaths are equal and unlabored. Pt stated +COVID yesterday.

## 2020-12-09 NOTE — ED PROVIDER NOTES
headaches. Psychiatric/Behavioral: Positive for confusion. Negative for behavioral problems. All other systems reviewed and are negative. Except as noted above the remainder of the review of systems was reviewed and negative.        PAST MEDICAL HISTORY     Past Medical History:   Diagnosis Date    Adrenal adenoma, right 11/2020    Bipolar 1 disorder ([facility] [address]     Cancer of skin of left ear     cured    Hearing loss     History of CVA (cerebrovascular accident)     History of tracheostomy     Hyperlipidemia     Hypertension     Left renal mass 11/2020    MCI (mild cognitive impairment) 2019    Uncontrolled diabetes mellitus ([facility] [address]      Past Surgical History:   Procedure Laterality Date    AV FISTULA REPAIR      COLONOSCOPY  10/21/15    w/polypectomy     HERNIA REPAIR      TONSILLECTOMY      TRACHEOTOMY       Social History     Socioeconomic History    Marital status:      Spouse name: None    Number of children: None    Years of education: None    Highest education level: None   Occupational History    None   Social Needs    Financial resource strain: None    Food insecurity     Worry: None     Inability: None    Transportation needs     Medical: None     Non-medical: None   Tobacco Use    Smoking status: Former Smoker    Smokeless tobacco: Never Used   Substance and Sexual Activity    Alcohol use: No     Alcohol/week: 0.0 standard drinks    Drug use: No    Sexual activity: None   Lifestyle    Physical activity     Days per week: None     Minutes per session: None    Stress: None   Relationships    Social connections     Talks on phone: None     Gets together: None     Attends Evangelical service: None     Active member of club or organization: None     Attends meetings of clubs or organizations: None     Relationship status: None    Intimate partner violence     Fear of current or ex partner: None     Emotionally abused: None     Physically abused: None Forced sexual activity: None   Other Topics Concern    None   Social History Narrative    Born in [place], had 2 siblings, they both passed        One daughter in [address] for the [employer] x 38 years       SCREENINGS             PHYSICAL EXAM    (up to 7 for level 4, 8 or more for level 5)     ED Triage Vitals [12/09/20 1545]   BP Temp Temp Source Pulse Resp SpO2 Height Weight   104/70 98.6 °F (37 °C) Oral 69 16 97 % 6' (1.829 m) 219 lb (99.3 kg)       Physical Exam  Vitals signs and nursing note reviewed. Constitutional:       General: He is not in acute distress. Appearance: He is well-developed. He is not diaphoretic. HENT:      Head: Normocephalic and atraumatic. Nose: Nose normal.   Eyes:      Conjunctiva/sclera: Conjunctivae normal.      Pupils: Pupils are equal, round, and reactive to light. Neck:      Musculoskeletal: Normal range of motion and neck supple. Cardiovascular:      Rate and Rhythm: Normal rate and regular rhythm. Heart sounds: Normal heart sounds. Pulmonary:      Effort: Pulmonary effort is normal. No respiratory distress. Breath sounds: Normal breath sounds. No wheezing. Abdominal:      General: Bowel sounds are normal.      Palpations: Abdomen is soft. Tenderness: There is no abdominal tenderness. Genitourinary:         Comments: Mild swelling And evidence of dried blood Around the penile shaft from previous Polo and dried blood around the brief. Skin:     General: Skin is warm and dry. Capillary Refill: Capillary refill takes less than 2 seconds. Findings: No rash. Neurological:      Mental Status: He is alert and oriented to person, place, and time. Cranial Nerves: No cranial nerve deficit.    Psychiatric:         Behavior: Behavior normal.         RESULTS     EKG: All EKG's are interpreted by the Emergency Department Physician who either signs or Co-signsthis chart in the absence of a cardiologist.    Sinus rhythm rate of 68 bpm no ST elevations QT of 388    RADIOLOGY:   Non-plain filmimages such as CT, Ultrasound and MRI are read by the radiologist. Plain radiographic images are visualized and preliminarily interpreted by the emergency physician with the below findings:        Interpretation per the Radiologist below, if available at the time ofthis note:    XR CHEST PORTABLE   Final Result   Borderline cardiomegaly. ED BEDSIDE ULTRASOUND:   Performed by ED Physician - none    LABS:  Labs Reviewed   CBC WITH AUTO DIFFERENTIAL - Abnormal; Notable for the following components:       Result Value    RBC 4.39 (*)     Hemoglobin 13.5 (*)     Hematocrit 39.6 (*)     Lymphocytes Absolute 0.7 (*)     All other components within normal limits   URINE RT REFLEX TO CULTURE - Abnormal; Notable for the following components:    Color, UA RED (*)     Clarity, UA TURBID (*)     Glucose, Ur 250 (*)     Bilirubin Urine SMALL (*)     Blood, Urine LARGE (*)     Protein, UA >=300 (*)     Nitrite, Urine POSITIVE (*)     Leukocyte Esterase, Urine LARGE (*)     All other components within normal limits   COMPREHENSIVE METABOLIC PANEL   MICROSCOPIC URINALYSIS       All other labs were within normal range or not returned as of this dictation. EMERGENCY DEPARTMENT COURSE and DIFFERENTIAL DIAGNOSIS/MDM:   Vitals:    Vitals:    12/09/20 1545   BP: 104/70   Pulse: 69   Resp: 16   Temp: 98.6 °F (37 °C)   TempSrc: Oral   SpO2: 97%   Weight: 219 lb (99.3 kg)   Height: 6' (1.829 m)            MDM     Patient is a 49-year-old male presenting to the ER with a chief complaint of Polo catheter coming out and being sent over because it can to be replaced. Patient is hemodynamically stable nontoxic-appearing. Patient has a pertinent urinary tract infection. His catheter has been replaced. I gave patient a prescription for Bactrim and Flomax.   Patient is instructed to follow-up with his doctor and this information is in his discharge paperwork for the nursing home to be aware of this information. Patient is discharged in stable condition with stable vital signs. CRITICAL CARE TIME       CONSULTS:  None    PROCEDURES:  Unless otherwise noted below, none     Procedures    FINAL IMPRESSION      1. Problem with Polo catheter, initial encounter ([facility] [address]    2. Urinary tract infection without hematuria, site unspecified          DISPOSITION/PLAN   DISPOSITION Decision To Discharge 12/09/2020 05:29:36 PM      PATIENT REFERRED TO:  JERRICA Mosher CNP  [address] [facility] [address] [facility]    Schedule an appointment as soon as possible for a visit in 1 day      Andrew Grant MD  [address] [place] [id number] [phone]    Schedule an appointment as soon as possible for a visit in 1 day        DISCHARGE MEDICATIONS:  New Prescriptions    SULFAMETHOXAZOLE-TRIMETHOPRIM (BACTRIM DS;SEPTRA DS) 800-160 MG PER TABLET    Take 1 tablet by mouth 2 times daily for 7 days    TAMSULOSIN (FLOMAX) 0.4 MG CAPSULE    Take 1 capsule by mouth daily          (Please notethat portions of this note were completed with a voice recognition program.  Efforts were made to edit the dictations but occasionally words are mis-transcribed.)    JERRICA Whitlock CNP (electronically signed)  Attending Emergency Physician          JERRICA Stern - [facility]  12/09/20 4392    Attending Supervisory Note/Shared Visit   I have personally performed a face to face diagnostic evaluation on this patient. I have reviewed the mid-levels findings and agree.   History and Exam by me shows Polo catheter problem      Real Mast DO  Attending Emergency Physician         Real Mast DO  12/11/20 0279

## 2020-12-10 ENCOUNTER — APPOINTMENT (OUTPATIENT)
Dept: CT IMAGING | Age: 70
DRG: 871 | End: 2020-12-10
Payer: MEDICARE

## 2020-12-10 PROBLEM — U07.1 PNEUMONIA DUE TO COVID-19 VIRUS: Status: ACTIVE | Noted: 2020-12-10

## 2020-12-10 PROBLEM — J12.82 PNEUMONIA DUE TO COVID-19 VIRUS: Status: ACTIVE | Noted: 2020-12-10

## 2020-12-10 LAB
ALBUMIN SERPL-MCNC: 2.6 G/DL (ref 3.5–4.6)
ALBUMIN SERPL-MCNC: 3.3 G/DL (ref 3.5–4.6)
ALP BLD-CCNC: 57 U/L (ref 35–104)
ALP BLD-CCNC: 67 U/L (ref 35–104)
ALT SERPL-CCNC: 19 U/L (ref 0–41)
ALT SERPL-CCNC: 22 U/L (ref 0–41)
ANION GAP SERPL CALCULATED.3IONS-SCNC: 11 MEQ/L (ref 9–15)
ANION GAP SERPL CALCULATED.3IONS-SCNC: 14 MEQ/L (ref 9–15)
AST SERPL-CCNC: 23 U/L (ref 0–40)
AST SERPL-CCNC: 28 U/L (ref 0–40)
BACTERIA: ABNORMAL /HPF
BASOPHILS ABSOLUTE: 0 K/UL (ref 0–0.2)
BASOPHILS RELATIVE PERCENT: 0.2 %
BILIRUB SERPL-MCNC: 0.4 MG/DL (ref 0.2–0.7)
BILIRUB SERPL-MCNC: <0.2 MG/DL (ref 0.2–0.7)
BILIRUBIN URINE: ABNORMAL
BLOOD, URINE: ABNORMAL
BUN BLDV-MCNC: 15 MG/DL (ref 8–23)
BUN BLDV-MCNC: 15 MG/DL (ref 8–23)
C-REACTIVE PROTEIN: 85.5 MG/L (ref 0–5)
CALCIUM SERPL-MCNC: 7.5 MG/DL (ref 8.5–9.9)
CALCIUM SERPL-MCNC: 8.9 MG/DL (ref 8.5–9.9)
CHLORIDE BLD-SCNC: 107 MEQ/L (ref 95–107)
CHLORIDE BLD-SCNC: 99 MEQ/L (ref 95–107)
CLARITY: ABNORMAL
CO2: 18 MEQ/L (ref 20–31)
CO2: 20 MEQ/L (ref 20–31)
COLOR: ABNORMAL
CREAT SERPL-MCNC: 0.68 MG/DL (ref 0.7–1.2)
CREAT SERPL-MCNC: 0.8 MG/DL (ref 0.7–1.2)
D DIMER: 3.23 MG/L FEU (ref 0–0.5)
EKG ATRIAL RATE: 166 BPM
EKG ATRIAL RATE: 36 BPM
EKG ATRIAL RATE: 37 BPM
EKG P AXIS: 16 DEGREES
EKG P AXIS: 29 DEGREES
EKG P-R INTERVAL: 140 MS
EKG P-R INTERVAL: 140 MS
EKG Q-T INTERVAL: 244 MS
EKG Q-T INTERVAL: 336 MS
EKG Q-T INTERVAL: 506 MS
EKG QRS DURATION: 102 MS
EKG QRS DURATION: 104 MS
EKG QRS DURATION: 106 MS
EKG QTC CALCULATION (BAZETT): 259 MS
EKG QTC CALCULATION (BAZETT): 397 MS
EKG QTC CALCULATION (BAZETT): 420 MS
EKG R AXIS: -13 DEGREES
EKG R AXIS: 2 DEGREES
EKG R AXIS: 80 DEGREES
EKG T AXIS: -81 DEGREES
EKG T AXIS: 236 DEGREES
EKG T AXIS: 256 DEGREES
EKG VENTRICULAR RATE: 178 BPM
EKG VENTRICULAR RATE: 36 BPM
EKG VENTRICULAR RATE: 37 BPM
EOSINOPHILS ABSOLUTE: 0 K/UL (ref 0–0.7)
EOSINOPHILS RELATIVE PERCENT: 0 %
EPITHELIAL CELLS, UA: ABNORMAL /HPF
FERRITIN: 1170 NG/ML (ref 30–400)
FIBRINOGEN: 617 MG/DL (ref 235–507)
GFR AFRICAN AMERICAN: >60
GFR AFRICAN AMERICAN: >60
GFR NON-AFRICAN AMERICAN: >60
GFR NON-AFRICAN AMERICAN: >60
GLOBULIN: 3 G/DL (ref 2.3–3.5)
GLOBULIN: 3 G/DL (ref 2.3–3.5)
GLUCOSE BLD-MCNC: 112 MG/DL (ref 70–99)
GLUCOSE BLD-MCNC: 200 MG/DL (ref 70–99)
GLUCOSE BLD-MCNC: 278 MG/DL (ref 60–115)
GLUCOSE BLD-MCNC: 309 MG/DL (ref 60–115)
GLUCOSE URINE: NEGATIVE MG/DL
HCT VFR BLD CALC: 35.3 % (ref 42–52)
HEMOGLOBIN: 12 G/DL (ref 14–18)
KETONES, URINE: 15 MG/DL
LACTIC ACID: 1.2 MMOL/L (ref 0.5–2.2)
LACTIC ACID: 3.3 MMOL/L (ref 0.5–2.2)
LEUKOCYTE ESTERASE, URINE: ABNORMAL
LYMPHOCYTES ABSOLUTE: 0.4 K/UL (ref 1–4.8)
LYMPHOCYTES RELATIVE PERCENT: 5.8 %
MAGNESIUM: 1.5 MG/DL (ref 1.7–2.4)
MAGNESIUM: 1.7 MG/DL (ref 1.7–2.4)
MCH RBC QN AUTO: 31 PG (ref 27–31.3)
MCHC RBC AUTO-ENTMCNC: 33.9 % (ref 33–37)
MCV RBC AUTO: 91.5 FL (ref 80–100)
MONOCYTES ABSOLUTE: 0.2 K/UL (ref 0.2–0.8)
MONOCYTES RELATIVE PERCENT: 2.5 %
NEUTROPHILS ABSOLUTE: 5.9 K/UL (ref 1.4–6.5)
NEUTROPHILS RELATIVE PERCENT: 91.5 %
NITRITE, URINE: POSITIVE
PDW BLD-RTO: 13.2 % (ref 11.5–14.5)
PERFORMED ON: ABNORMAL
PERFORMED ON: ABNORMAL
PH UA: 5.5 (ref 5–9)
PLATELET # BLD: 314 K/UL (ref 130–400)
POTASSIUM SERPL-SCNC: 3.6 MEQ/L (ref 3.4–4.9)
POTASSIUM SERPL-SCNC: 4.1 MEQ/L (ref 3.4–4.9)
PROCALCITONIN: 0.12 NG/ML (ref 0–0.15)
PROTEIN UA: >=300 MG/DL
RBC # BLD: 3.85 M/UL (ref 4.7–6.1)
RBC UA: ABNORMAL /HPF (ref 0–2)
SARS-COV-2, NAAT: DETECTED
SODIUM BLD-SCNC: 131 MEQ/L (ref 135–144)
SODIUM BLD-SCNC: 138 MEQ/L (ref 135–144)
SPECIFIC GRAVITY UA: 1.02 (ref 1–1.03)
TOTAL PROTEIN: 5.6 G/DL (ref 6.3–8)
TOTAL PROTEIN: 6.3 G/DL (ref 6.3–8)
URINE REFLEX TO CULTURE: YES
UROBILINOGEN, URINE: 1 E.U./DL
WBC # BLD: 6.4 K/UL (ref 4.8–10.8)
WBC UA: ABNORMAL /HPF (ref 0–5)

## 2020-12-10 PROCEDURE — 93005 ELECTROCARDIOGRAM TRACING: CPT | Performed by: PHYSICIAN ASSISTANT

## 2020-12-10 PROCEDURE — 93010 ELECTROCARDIOGRAM REPORT: CPT | Performed by: INTERNAL MEDICINE

## 2020-12-10 PROCEDURE — 2500000003 HC RX 250 WO HCPCS: Performed by: INTERNAL MEDICINE

## 2020-12-10 PROCEDURE — 6360000004 HC RX CONTRAST MEDICATION: Performed by: INTERNAL MEDICINE

## 2020-12-10 PROCEDURE — 94660 CPAP INITIATION&MGMT: CPT

## 2020-12-10 PROCEDURE — 2580000003 HC RX 258: Performed by: INTERNAL MEDICINE

## 2020-12-10 PROCEDURE — 83605 ASSAY OF LACTIC ACID: CPT

## 2020-12-10 PROCEDURE — 99223 1ST HOSP IP/OBS HIGH 75: CPT | Performed by: INTERNAL MEDICINE

## 2020-12-10 PROCEDURE — 99222 1ST HOSP IP/OBS MODERATE 55: CPT | Performed by: INTERNAL MEDICINE

## 2020-12-10 PROCEDURE — 85379 FIBRIN DEGRADATION QUANT: CPT

## 2020-12-10 PROCEDURE — 85384 FIBRINOGEN ACTIVITY: CPT

## 2020-12-10 PROCEDURE — 86140 C-REACTIVE PROTEIN: CPT

## 2020-12-10 PROCEDURE — 2580000003 HC RX 258: Performed by: PHYSICIAN ASSISTANT

## 2020-12-10 PROCEDURE — 2580000003 HC RX 258: Performed by: EMERGENCY MEDICINE

## 2020-12-10 PROCEDURE — 84132 ASSAY OF SERUM POTASSIUM: CPT

## 2020-12-10 PROCEDURE — 82728 ASSAY OF FERRITIN: CPT

## 2020-12-10 PROCEDURE — 80053 COMPREHEN METABOLIC PANEL: CPT

## 2020-12-10 PROCEDURE — 82330 ASSAY OF CALCIUM: CPT

## 2020-12-10 PROCEDURE — 83735 ASSAY OF MAGNESIUM: CPT

## 2020-12-10 PROCEDURE — 6370000000 HC RX 637 (ALT 250 FOR IP): Performed by: INTERNAL MEDICINE

## 2020-12-10 PROCEDURE — 6360000002 HC RX W HCPCS: Performed by: PHYSICIAN ASSISTANT

## 2020-12-10 PROCEDURE — 6360000002 HC RX W HCPCS: Performed by: INTERNAL MEDICINE

## 2020-12-10 PROCEDURE — 94760 N-INVAS EAR/PLS OXIMETRY 1: CPT

## 2020-12-10 PROCEDURE — 85014 HEMATOCRIT: CPT

## 2020-12-10 PROCEDURE — 85025 COMPLETE CBC W/AUTO DIFF WBC: CPT

## 2020-12-10 PROCEDURE — 6360000002 HC RX W HCPCS: Performed by: EMERGENCY MEDICINE

## 2020-12-10 PROCEDURE — 36600 WITHDRAWAL OF ARTERIAL BLOOD: CPT

## 2020-12-10 PROCEDURE — 82435 ASSAY OF BLOOD CHLORIDE: CPT

## 2020-12-10 PROCEDURE — 71275 CT ANGIOGRAPHY CHEST: CPT

## 2020-12-10 PROCEDURE — 84295 ASSAY OF SERUM SODIUM: CPT

## 2020-12-10 PROCEDURE — U0002 COVID-19 LAB TEST NON-CDC: HCPCS

## 2020-12-10 PROCEDURE — 82565 ASSAY OF CREATININE: CPT

## 2020-12-10 PROCEDURE — 36415 COLL VENOUS BLD VENIPUNCTURE: CPT

## 2020-12-10 PROCEDURE — 6360000002 HC RX W HCPCS

## 2020-12-10 PROCEDURE — 2000000000 HC ICU R&B

## 2020-12-10 PROCEDURE — 6370000000 HC RX 637 (ALT 250 FOR IP): Performed by: PHYSICIAN ASSISTANT

## 2020-12-10 PROCEDURE — 82803 BLOOD GASES ANY COMBINATION: CPT

## 2020-12-10 RX ORDER — SODIUM CHLORIDE 9 MG/ML
INJECTION, SOLUTION INTRAVENOUS CONTINUOUS
Status: DISCONTINUED | OUTPATIENT
Start: 2020-12-10 | End: 2020-12-12

## 2020-12-10 RX ORDER — INSULIN GLARGINE 100 [IU]/ML
6 INJECTION, SOLUTION SUBCUTANEOUS 2 TIMES DAILY
Status: DISCONTINUED | OUTPATIENT
Start: 2020-12-10 | End: 2020-12-11

## 2020-12-10 RX ORDER — DEXTROSE MONOHYDRATE 50 MG/ML
100 INJECTION, SOLUTION INTRAVENOUS PRN
Status: DISCONTINUED | OUTPATIENT
Start: 2020-12-10 | End: 2020-12-16 | Stop reason: HOSPADM

## 2020-12-10 RX ORDER — MAGNESIUM SULFATE IN WATER 40 MG/ML
2 INJECTION, SOLUTION INTRAVENOUS ONCE
Status: COMPLETED | OUTPATIENT
Start: 2020-12-10 | End: 2020-12-10

## 2020-12-10 RX ORDER — ONDANSETRON 2 MG/ML
4 INJECTION INTRAMUSCULAR; INTRAVENOUS EVERY 6 HOURS PRN
Status: DISCONTINUED | OUTPATIENT
Start: 2020-12-10 | End: 2020-12-16 | Stop reason: HOSPADM

## 2020-12-10 RX ORDER — MAGNESIUM SULFATE IN WATER 40 MG/ML
2 INJECTION, SOLUTION INTRAVENOUS ONCE
Status: DISCONTINUED | OUTPATIENT
Start: 2020-12-10 | End: 2020-12-16 | Stop reason: HOSPADM

## 2020-12-10 RX ORDER — ATROPINE SULFATE 0.1 MG/ML
1 INJECTION INTRAVENOUS ONCE
Status: COMPLETED | OUTPATIENT
Start: 2020-12-10 | End: 2020-12-10

## 2020-12-10 RX ORDER — SODIUM CHLORIDE 9 MG/ML
INJECTION, SOLUTION INTRAVENOUS
Status: DISPENSED
Start: 2020-12-10 | End: 2020-12-10

## 2020-12-10 RX ORDER — SODIUM CHLORIDE 0.9 % (FLUSH) 0.9 %
10 SYRINGE (ML) INJECTION EVERY 12 HOURS SCHEDULED
Status: DISCONTINUED | OUTPATIENT
Start: 2020-12-10 | End: 2020-12-16 | Stop reason: HOSPADM

## 2020-12-10 RX ORDER — NICOTINE POLACRILEX 4 MG
15 LOZENGE BUCCAL PRN
Status: DISCONTINUED | OUTPATIENT
Start: 2020-12-10 | End: 2020-12-16 | Stop reason: HOSPADM

## 2020-12-10 RX ORDER — 0.9 % SODIUM CHLORIDE 0.9 %
1000 INTRAVENOUS SOLUTION INTRAVENOUS ONCE
Status: COMPLETED | OUTPATIENT
Start: 2020-12-10 | End: 2020-12-10

## 2020-12-10 RX ORDER — METOPROLOL TARTRATE 50 MG/1
50 TABLET, FILM COATED ORAL EVERY 8 HOURS
Status: DISCONTINUED | OUTPATIENT
Start: 2020-12-10 | End: 2020-12-10

## 2020-12-10 RX ORDER — ATROPINE SULFATE 0.1 MG/ML
INJECTION INTRAVENOUS
Status: DISPENSED
Start: 2020-12-10 | End: 2020-12-10

## 2020-12-10 RX ORDER — DEXAMETHASONE SODIUM PHOSPHATE 10 MG/ML
6 INJECTION, SOLUTION INTRAMUSCULAR; INTRAVENOUS EVERY 24 HOURS
Status: DISCONTINUED | OUTPATIENT
Start: 2020-12-10 | End: 2020-12-16 | Stop reason: HOSPADM

## 2020-12-10 RX ORDER — DEXTROSE MONOHYDRATE 25 G/50ML
12.5 INJECTION, SOLUTION INTRAVENOUS PRN
Status: DISCONTINUED | OUTPATIENT
Start: 2020-12-10 | End: 2020-12-16 | Stop reason: HOSPADM

## 2020-12-10 RX ORDER — MEMANTINE HYDROCHLORIDE 5 MG/1
5 TABLET ORAL 2 TIMES DAILY
Status: DISCONTINUED | OUTPATIENT
Start: 2020-12-10 | End: 2020-12-16 | Stop reason: HOSPADM

## 2020-12-10 RX ORDER — POLYETHYLENE GLYCOL 3350 17 G/17G
17 POWDER, FOR SOLUTION ORAL DAILY PRN
Status: DISCONTINUED | OUTPATIENT
Start: 2020-12-10 | End: 2020-12-16 | Stop reason: HOSPADM

## 2020-12-10 RX ORDER — ACETAMINOPHEN 650 MG/1
650 SUPPOSITORY RECTAL EVERY 6 HOURS PRN
Status: DISCONTINUED | OUTPATIENT
Start: 2020-12-10 | End: 2020-12-16 | Stop reason: HOSPADM

## 2020-12-10 RX ORDER — ACETAMINOPHEN 500 MG
1000 TABLET ORAL ONCE
Status: COMPLETED | OUTPATIENT
Start: 2020-12-10 | End: 2020-12-10

## 2020-12-10 RX ORDER — ACETAMINOPHEN 325 MG/1
650 TABLET ORAL EVERY 6 HOURS PRN
Status: DISCONTINUED | OUTPATIENT
Start: 2020-12-10 | End: 2020-12-16 | Stop reason: HOSPADM

## 2020-12-10 RX ORDER — SODIUM CHLORIDE 0.9 % (FLUSH) 0.9 %
10 SYRINGE (ML) INJECTION PRN
Status: DISCONTINUED | OUTPATIENT
Start: 2020-12-10 | End: 2020-12-16 | Stop reason: HOSPADM

## 2020-12-10 RX ORDER — DOPAMINE HYDROCHLORIDE 160 MG/100ML
INJECTION, SOLUTION INTRAVENOUS
Status: COMPLETED
Start: 2020-12-10 | End: 2020-12-10

## 2020-12-10 RX ORDER — DOPAMINE HYDROCHLORIDE 160 MG/100ML
2.5 INJECTION, SOLUTION INTRAVENOUS CONTINUOUS
Status: DISCONTINUED | OUTPATIENT
Start: 2020-12-10 | End: 2020-12-12

## 2020-12-10 RX ORDER — PROMETHAZINE HYDROCHLORIDE 12.5 MG/1
12.5 TABLET ORAL EVERY 6 HOURS PRN
Status: DISCONTINUED | OUTPATIENT
Start: 2020-12-10 | End: 2020-12-16 | Stop reason: HOSPADM

## 2020-12-10 RX ADMIN — Medication 10 ML: at 08:46

## 2020-12-10 RX ADMIN — SODIUM CHLORIDE: 9 INJECTION, SOLUTION INTRAVENOUS at 04:50

## 2020-12-10 RX ADMIN — DOPAMINE HYDROCHLORIDE 2.5 MCG/KG/MIN: 160 INJECTION, SOLUTION INTRAVENOUS at 04:49

## 2020-12-10 RX ADMIN — ENOXAPARIN SODIUM 100 MG: 100 INJECTION SUBCUTANEOUS at 08:45

## 2020-12-10 RX ADMIN — INSULIN LISPRO 4 UNITS: 100 INJECTION, SOLUTION INTRAVENOUS; SUBCUTANEOUS at 08:50

## 2020-12-10 RX ADMIN — MAGNESIUM SULFATE IN WATER 2 G: 40 INJECTION, SOLUTION INTRAVENOUS at 01:24

## 2020-12-10 RX ADMIN — ENOXAPARIN SODIUM 100 MG: 100 INJECTION SUBCUTANEOUS at 21:00

## 2020-12-10 RX ADMIN — INSULIN GLARGINE 6 UNITS: 100 INJECTION, SOLUTION SUBCUTANEOUS at 21:18

## 2020-12-10 RX ADMIN — CEFTRIAXONE SODIUM 1 G: 1 INJECTION, POWDER, FOR SOLUTION INTRAMUSCULAR; INTRAVENOUS at 01:08

## 2020-12-10 RX ADMIN — Medication 10 ML: at 21:01

## 2020-12-10 RX ADMIN — ATROPINE SULFATE 1 MG: 0.1 INJECTION, SOLUTION ENDOTRACHEAL; INTRAMUSCULAR; INTRAVENOUS; SUBCUTANEOUS at 02:54

## 2020-12-10 RX ADMIN — IOPAMIDOL 100 ML: 612 INJECTION, SOLUTION INTRAVENOUS at 23:15

## 2020-12-10 RX ADMIN — ACETAMINOPHEN 1000 MG: 500 TABLET ORAL at 01:08

## 2020-12-10 RX ADMIN — SODIUM CHLORIDE 1000 ML: 9 INJECTION, SOLUTION INTRAVENOUS at 01:10

## 2020-12-10 RX ADMIN — SODIUM CHLORIDE 1000 ML: 9 INJECTION, SOLUTION INTRAVENOUS at 02:54

## 2020-12-10 RX ADMIN — DEXAMETHASONE SODIUM PHOSPHATE 6 MG: 10 INJECTION INTRAMUSCULAR; INTRAVENOUS at 05:19

## 2020-12-10 RX ADMIN — INSULIN LISPRO 8 UNITS: 100 INJECTION, SOLUTION INTRAVENOUS; SUBCUTANEOUS at 16:13

## 2020-12-10 RX ADMIN — INSULIN LISPRO 10 UNITS: 100 INJECTION, SOLUTION INTRAVENOUS; SUBCUTANEOUS at 11:26

## 2020-12-10 RX ADMIN — NOREPINEPHRINE BITARTRATE 5 MCG/MIN: 1 INJECTION INTRAVENOUS at 04:30

## 2020-12-10 RX ADMIN — SODIUM CHLORIDE: 9 INJECTION, SOLUTION INTRAVENOUS at 06:12

## 2020-12-10 RX ADMIN — INSULIN GLARGINE 6 UNITS: 100 INJECTION, SOLUTION SUBCUTANEOUS at 08:50

## 2020-12-10 RX ADMIN — REMDESIVIR 200 MG: 100 INJECTION, POWDER, LYOPHILIZED, FOR SOLUTION INTRAVENOUS at 16:06

## 2020-12-10 RX ADMIN — MEMANTINE HYDROCHLORIDE 5 MG: 5 TABLET ORAL at 21:01

## 2020-12-10 ASSESSMENT — ENCOUNTER SYMPTOMS
EYES NEGATIVE: 1
SHORTNESS OF BREATH: 1
COUGH: 0
GASTROINTESTINAL NEGATIVE: 1

## 2020-12-10 ASSESSMENT — PAIN SCALES - GENERAL
PAINLEVEL_OUTOF10: 0

## 2020-12-10 NOTE — CARE COORDINATION
RECEIVED CALL FROM [facility]. \[facility]\"" PT WAS SCHEDULED FOR CAPACITY EVAL. THAT WAS SUPPOSED TO HAPPEN NEXT WEEK (TUES). [facility] PT. DTR MAXIME IS SUPPOSED TO BE COMING FROM [place] TO ASSIST WITH THIS PROCESS. SHE IS REQUESTING TO BE UPDATED WITH DC PLANS. [phone] EXT [phone].

## 2020-12-10 NOTE — DISCHARGE INSTR - COC
Continuity of Care Form    Patient Name: Vanessa Capone   :  1950  MRN:  [id number]    Admit date:  2020  Discharge date:  20    Code Status Order: Full Code   Advance Directives:      Admitting Physician:   Keyla Pichardo MD  PCP: JERRICA Swanson CNP    [facility]  Discharging Unit Phone Number: [phone]    Emergency Contact:   Extended Emergency Contact Information  Primary Emergency Contact: [address] Phone: [phone]  Relation: Domestic Partner  Secondary Emergency Contact: Radha Meredith  [email or web address] Phone: [phone]  Relation: Child    Past Surgical History:  Past Surgical History:   Procedure Laterality Date    AV FISTULA REPAIR      COLONOSCOPY  10/21/15    w/polypectomy     HERNIA REPAIR      TONSILLECTOMY      TRACHEOTOMY         Immunization History:   Immunization History   Administered Date(s) Administered    Pneumococcal Polysaccharide ([id number]) 10/01/2015       Active Problems:  Patient Active Problem List   Diagnosis Code    Bipolar 1 disorder ([address] F31.9    Diabetes ([address] E11.9    Pneumonia J18.9    Dementia ([address] F03.90    Renal mass, left N28.89    Urinary retention R33.9    Spinal stenosis of lumbar region with neurogenic claudication M48.062    Weakness R53.1    Paroxysmal atrial fibrillation ([address] I48.0    Acquired cavus deformity of foot M21.6X9    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    Diabetic peripheral neuropathy ([address] E11.42    Mild cognitive disorder F09    Pneumonia due to COVID-19 virus U07.1, J12.89       Isolation/Infection:   Isolation            Droplet Plus          Patient Infection Status       Infection Onset Added Last Indicated Last Indicated By Review Planned Expiration Resolved Resolved By    COVID-19 12/10/20 12/10/20 12/10/20 COVID-19 20      Resolved    COVID-19 Rule Out 12/10/20 12/10/20 12/10/20 COVID-19 (Ordered)   12/10/20 Rule-Out Test Resulted    COVID-19 Rule Out 11/29/20 11/29/20 11/29/20 COVID-19 (Ordered)   11/29/20 Rule-Out Test Resulted    COVID-19 Rule Out 11/22/20 11/22/20 11/23/20 COVID-19 (Ordered)   11/23/20 Rule-Out Test Resulted    COVID-19 Rule Out 11/22/20 11/22/20 11/22/20 COVID-19 (Ordered)   11/22/20 Rule-Out Test Resulted            Nurse Assessment:  Last Vital Signs: BP (!) 113/55   Pulse 71   Temp 99.4 °F (37.4 °C) (Axillary)   Resp 15   Ht 6' (1.829 m)   Wt 220 lb (99.8 kg)   SpO2 100%   BMI 29.84 kg/m²     Last documented pain score (0-10 scale): Pain Level: 0  Last Weight:   Wt Readings from Last 1 Encounters:   12/09/20 220 lb (99.8 kg)     Mental Status:  disoriented    IV Access:  - None    Nursing Mobility/ADLs:  Walking   Assisted  Transfer  Assisted  Bathing  Assisted  Dressing  Assisted  Toileting  Assisted  Feeding  Independent  Med Admin  Assisted  Med Delivery   whole    Wound Care Documentation and Therapy:        Elimination:  Continence:   · Bowel: No  · Bladder: montgomery cath   Urinary Catheter: Insertion Date: 12/09/20   Colostomy/Ileostomy/Ileal Conduit: {YES / UK:61077}       Date of Last BM: 12/14/20    Intake/Output Summary (Last 24 hours) at 12/10/2020 1243  Last data filed at 12/10/2020 0500  Gross per 24 hour   Intake 2075 ml   Output 1200 ml   Net 875 ml     I/O last 3 completed shifts:   In: 2075 [I.V.:25; IV Piggyback:2050]  Out: 1200 [Urine:1200]    Safety Concerns:     [address] [facility] Safety Concerns:[id number]}    Impairments/Disabilities:      Hearing    Nutrition Therapy:  Current Nutrition Therapy:   Diet carb control diabetic diet     Routes of Feeding: Oral  Liquids: {Slp liquid thickness:21601}  Daily Fluid Restriction: no  Last Modified Barium Swallow with Video (Video Swallowing Test): not done    Treatments at the Time of Hospital Discharge:   Respiratory Treatments: ***  Oxygen Therapy:  is on oxygen at 3 L/min per nasal cannula. Ventilator:    - BiPAP   IPAP: 12 cmH20, CPAP/EPAP: 6 cmH2O only when sleeping    Rehab Therapies: {THERAPEUTIC INTERVENTION:2693808184}  Weight Bearing Status/Restrictions: [address] [facility] Weight Bearin}  Other Medical Equipment (for information only, NOT a DME order):  {EQUIPMENT:889552632}  Other Treatments: ***    Patient's personal belongings (please select all that are sent with patient):  {P DME Belongings:392119705}    RN SIGNATURE:  Electronically signed by Sylvester Babinski, RN on 20 at 5:38 PM EST    CASE MANAGEMENT/SOCIAL WORK SECTION    Inpatient Status Date: ***    Readmission Risk Assessment Score:  Readmission Risk              Risk of Unplanned Readmission:        21           Discharging to Facility/ Agency   · Name: [facility]  · Address:  · Phone:[phone]  · Fax:    Dialysis Facility (if applicable)   · Name:  · Address:  · Dialysis Schedule:  · Phone:  · Fax:    / signature: Electronically signed by BELEN Logan on 12/10/20 at 12:43 PM EST      PHYSICIAN SECTION    Prognosis: Good    Condition at Discharge: Stable    Rehab Potential (if transferring to Rehab): Good    Recommended Labs or Other Treatments After Discharge: none     Physician Certification: I certify the above information and transfer of Jeff Tripp  is necessary for the continuing treatment of the diagnosis listed and that he requires [facility] for less 30 days.      Update Admission H&P: No change in H&P    PHYSICIAN SIGNATURE:  Electronically signed by Margarette Read DO on 20 at 2:32 PM EST

## 2020-12-10 NOTE — CONSULTS
current facility-administered medications on file prior to encounter. Current Outpatient Medications on File Prior to Encounter   Medication Sig Dispense Refill    sulfamethoxazole-trimethoprim (BACTRIM DS;SEPTRA DS) 800-160 MG per tablet Take 1 tablet by mouth 2 times daily for 7 days 14 tablet 0    tamsulosin (FLOMAX) 0.4 MG capsule Take 1 capsule by mouth daily 10 capsule 0    metFORMIN (GLUCOPHAGE) 1000 MG tablet Take 1 tablet by mouth 2 times daily (with meals) 60 tablet 0    insulin glargine (LANTUS) 100 UNIT/ML injection vial Inject 6 Units into the skin 2 times daily 1 vial 3    rivaroxaban (XARELTO) 20 MG TABS tablet Take 1 tablet by mouth daily 30 tablet 0    metoprolol tartrate (LOPRESSOR) 50 MG tablet Take 1 tablet by mouth every 8 hours 60 tablet 3    digoxin (LANOXIN) 125 MCG tablet Take 1 tablet by mouth daily 30 tablet 3    tamsulosin (FLOMAX) 0.4 MG capsule Take 1 capsule by mouth daily 30 capsule 3    gabapentin (NEURONTIN) 300 MG capsule Take 300 mg by mouth 2 times daily.  memantine (NAMENDA) 5 MG tablet Take 5 mg by mouth 2 times daily      alogliptin (NESINA) 25 MG TABS tablet Take 25 mg by mouth daily      SIMVASTATIN PO Take by mouth      aspirin 81 MG tablet Take 81 mg by mouth daily         Allergies   Allergen Reactions    Pioglitazone          Family History   Problem Relation Age of Onset    Diabetes Mother     Heart Disease Mother     Stroke Father     Heart Disease Father     Cancer Brother     Stroke Sister          Physical Exam:      Physical Exam   Constitutional: He is oriented to person, place, and time. HENT:   Head: Normocephalic. Eyes: Pupils are equal, round, and reactive to light. Neck: Normal range of motion. No JVD present. No tracheal deviation present. No thyromegaly present. Cardiovascular: Normal heart sounds. No murmur heard. Pulmonary/Chest: No respiratory distress. He has no wheezes. He has no rales. He exhibits no tenderness. Abdominal: Soft. Bowel sounds are normal. He exhibits no distension and no mass. There is no abdominal tenderness. There is no rebound and no guarding. Musculoskeletal:         General: No tenderness or edema. Lymphadenopathy:     He has no cervical adenopathy. Neurological: He is alert and oriented to person, place, and time. Skin: Skin is warm. No rash noted. No erythema. No pallor. Blood pressure (!) 113/55, pulse 71, temperature 99.4 °F (37.4 °C), temperature source Axillary, resp. rate 15, height 6' (1.829 m), weight 220 lb (99.8 kg), SpO2 100 %.       .   Lab Results   Component Value Date    WBC 6.4 12/10/2020    HGB 12.0 (L) 12/10/2020    HCT 35.3 (L) 12/10/2020    MCV 91.5 12/10/2020     12/10/2020     Lab Results   Component Value Date     12/10/2020    K 4.1 12/10/2020    K 4.3 11/28/2020     12/10/2020    CO2 20 12/10/2020    BUN 15 12/10/2020    CREATININE 0.68 12/10/2020    GLUCOSE 200 12/10/2020    CALCIUM 7.5 12/10/2020        Microscopic Urinalysis [[id number]] (Abnormal)  Collected: 12/09/20 2345         Updated: 12/10/20 0123        WBC, UA  21-50  /HPF         RBC, UA    /HPF         Epithelial Cells, UA  5-10  /HPF         Bacteria, UA  FEW  /HPF        Urine Reflex to Culture [[id number]] (Abnormal)  Collected: 12/09/20 2345       Specimen: Urine, clean catch  Updated: 12/10/20 0119        Color, UA  RED        Clarity, UA  TURBID        Glucose, Ur  Negative  mg/dL         Bilirubin Urine  SMALL        Ketones, Urine  15  mg/dL         Specific [place], UA  1.025        Blood, Urine  LARGE        pH, UA  5.5        Protein, UA  >=300  mg/dL         Urobilinogen, Urine  1.0  E.U./dL         Nitrite, Urine  POSITIVE        Leukocyte Esterase, Urine  MODERATE        Urine Reflex to Culture  Yes        Lactic Acid, Plasma [[id number]] (Abnormal)  Collected: 12/09/20 2345        Specimen: Blood  Updated: 12/10/20 0018                  Ferritin [[id number]] (Abnormal)  Collected: 12/10/20 0416         Updated: 12/10/20 0714        Ferritin  1,170.0  ng/mL        D-Dimer, Quantitative [[id number]] (Abnormal)  Collected: 12/10/20 0416       Specimen: Blood  Updated: 12/10/20 0545        D-Dimer, Quant  3.23  mg/L FEU        Narrative:         Latia Conley tel. [phone],   DIMER results called to and read back by Fede Aguila RN, 12/10/2020 05:45,   by Loretta Silva       FIBRINOGEN [[id number]] (Abnormal)  Collected: 12/10/20 0416       Specimen: Blood  Updated: 12/10/20 0502        Fibrinogen  617.0  mg/dL           ASSESSMENT:  Patient Active Problem List   Diagnosis    [facility]    Diabetes ([facility]    Pneumonia    Dementia ([facility]    Renal mass, left    Urinary retention    Spinal stenosis of lumbar region with neurogenic claudication    Weakness    Paroxysmal atrial fibrillation (HCC)    Acquired cavus deformity of foot    Adhesive capsulitis of shoulder    Diabetic peripheral neuropathy (HCC)    Mild cognitive disorder    Pneumonia due to COVID-19 virus         PLAN:  Sepsis elevated lactic acid hypotension    Covid 19 infection with hypoxia  On O2 and Decadron  Interim visit    Probable UTI given positive urinalysis elevated white blood cell count  Continue Rocephin

## 2020-12-10 NOTE — PROGRESS NOTES
ICU Rounds - Pharmacy Evaluation:     Recent Labs     12/10/20  0416   WBC 6.4   HGB 12.0*   HCT 35.3*        Recent Labs     12/10/20  0416      K 4.1   MG 1.7           Medication categories below assessed, text colored if notable  Antimicrobial Therapy Pressors Sedation Insulin Therapy Steroid Therapy   DVT Prophylaxis/ Anticoagulant   enoxaparin  1 mg/kg Subcutaneous BID (xarelto at home) Stress Ulcer Prophylaxis  Shock, no other indication Bowel Regimen IV to PO      Continuous Infusions:   norepinephrine 25 mcg/min (12/10/20 0620)   sodium chloride Stopped (12/10/20 0647)   dextrose     DOPamine 2.5 mcg/kg/min (12/10/20 0717)     Antimicrobial Therapy:   ceftriaxone  ID on consult: Y Insulin Therapy (goal: 140-180):  0 units given past 24 hours   Lantus 6    insulin glargine  6 Units Subcutaneous BID   insulin lispro  0-12 Units Subcutaneous TID WC   insulin lispro  0-6 Units Subcutaneous Nightly  Recent Labs     12/09/20  1615 12/09/20  2345 12/10/20  0416   GLUCOSE 200* 112* 200*      Diet (NPO, tube feeds, TPN): Carb control   Oxygen Therapy: Nasal cannula

## 2020-12-10 NOTE — PROGRESS NOTES
Physician Progress Note      PATIENTMireille Brooks  CSN #:                  [id number]  :                       1950  ADMIT DATE:       2020 11:12 PM  [address] [place] DATE:  RESPONDING  PROVIDER #:        Nikko HANSON DO          QUERY TEXT:    Pt admitted with COVID. Patient noted to have sepsis criteria. If possible,   please document in the progress notes and discharge summary if you are   evaluating and /or treating any of the following: The medical record reflects the following:  Risk Factors: COVID, UTI, resident of nursing home  Clinical Indicators: ferritin 1170, fibrinogen 617, d-dimer 3.23, CRP 85.5, T   101, RR 25  Treatment: ICU admission, pulmonology consult, ID consult    Keisha PORTER, RN, CDS  [phone]  Options provided:  -- Sepsis, present on admission  -- Sepsis, present on admission  -- Sepsis, not present on admission,  -- No Sepsis, localized infection only  -- Other - I will add my own diagnosis  -- Disagree - Not applicable / Not valid  -- Disagree - Clinically unable to determine / Unknown  -- Refer to Clinical Documentation Reviewer    PROVIDER RESPONSE TEXT:    This patient has sepsis which was present on admission.     Query created by: Demarcus Loya on 12/10/2020 10:01 AM      Electronically signed by:  Hakeem Massey DO 12/10/2020 5:49 PM

## 2020-12-10 NOTE — ED NOTES
Pt to tcu with newton rodriguez and taran rodriguez, pt on [facility] monitored. Stable, alert, responds to voice commands, skin w/d/slifghtly pale. 0 distress, 0 sob at this time.      Daina Ruiz RN  12/10/20 9221

## 2020-12-10 NOTE — H&P
Hospitalist Group   History and Physical      CHIEF COMPLAINT: Labored breathing    History of Present Illness:  65 y.o. male with a history of dementia, bipolar, hypertension, hyperlipidemia presents from nursing home with labored breathing. Patient was sent earlier to the ER because he pulled his Polo. He was sent back to the nursing home from the ER after placing Polo in the send him back because of shortness of breath. Patient has dementia and he is unable to give any history. It was reported that patient had Covid 19+ at the nursing home. He was admitted less than 3 weeks ago because of pneumonia and after a syncopal episode. His COVID-19 test was negative then. In the ER, patient was tested positive for COVID-19. He was also found to have UTI. Patient was also hypotensive despite receiving 3 L normal saline fluids. Patient had an episode of sinus bradycardia requiring atropine in the ER and for that reason he was admitted to the ICU. REVIEW OF SYSTEMS:  Unable to obtain due to mental status      PMH:  Past Medical History:   Diagnosis Date    Adrenal adenoma, right 11/2020    Bipolar 1 disorder ([address]     Cancer of skin of left ear     cured    Hearing loss     History of CVA (cerebrovascular accident)     History of tracheostomy     Hyperlipidemia     Hypertension     Left renal mass 11/2020    MCI (mild cognitive impairment) 2019    Uncontrolled diabetes mellitus ([address]        Surgical History:  Past Surgical History:   Procedure Laterality Date    AV FISTULA REPAIR      COLONOSCOPY  10/21/15    w/polypectomy     HERNIA REPAIR      TONSILLECTOMY      TRACHEOTOMY         Medications Prior to Admission:    Prior to Admission medications    Medication Sig Start Date End Date Taking?  Authorizing Provider   sulfamethoxazole-trimethoprim (BACTRIM DS;SEPTRA DS) 800-160 MG per tablet Take 1 tablet by mouth 2 times daily for 7 days 12/9/20 12/16/20  Anthony Means APRN - CNP   tamsulosin (FLOMAX) 0.4 MG capsule Take 1 capsule by mouth daily 12/9/20   Bryan Kwan, APRN - CNP   metFORMIN (GLUCOPHAGE) 1000 MG tablet Take 1 tablet by mouth 2 times daily (with meals) 11/30/20   Gerald Eagle MD   insulin glargine (LANTUS) 100 UNIT/ML injection vial Inject 6 Units into the skin 2 times daily 11/29/20   Margarette Jacek, DO   rivaroxaban (XARELTO) 20 MG TABS tablet Take 1 tablet by mouth daily 11/26/20   Pitjudson Jacek, DO   metoprolol tartrate (LOPRESSOR) 50 MG tablet Take 1 tablet by mouth every 8 hours 11/26/20   [facility], DO   digoxin (LANOXIN) 125 MCG tablet Take 1 tablet by mouth daily 11/27/20   Margarette Riojases, DO   tamsulosin (FLOMAX) 0.4 MG capsule Take 1 capsule by mouth daily 11/27/20   Hakeem Sarah, DO   gabapentin (NEURONTIN) 300 MG capsule Take 300 mg by mouth 2 times daily. Historical Provider, MD   memantine (NAMENDA) 5 MG tablet Take 5 mg by mouth 2 times daily    Historical Provider, MD   alogliptin (NESINA) 25 MG TABS tablet Take 25 mg by mouth daily    Historical Provider, MD   SIMVASTATIN PO Take by mouth    Historical Provider, MD   aspirin 81 MG tablet Take 81 mg by mouth daily    Historical Provider, MD       Allergies:    Pioglitazone    Social History:    reports that he has quit smoking. He has never used smokeless tobacco. He reports that he does not drink alcohol or use drugs.     Family History:       Problem Relation Age of Onset    Diabetes Mother     Heart Disease Mother     Stroke Father     Heart Disease Father     Cancer Brother     Stroke Sister        PHYSICAL EXAM:  Vitals:  BP (!) 81/56   Pulse 70   Temp 101 °F (38.3 °C) (Oral)   Resp 15   Ht 6' (1.829 m)   Wt 220 lb (99.8 kg)   SpO2 95%   BMI 29.84 kg/m²   General Appearance: Lethargic,  in no acute distress  Skin: warm and dry, no rash or erythema  Head: normocephalic and atraumatic  Eyes: pupils equal, round, and reactive to light, extraocular eye movements intact, conjunctivae normal  ENT: tympanic membrane, external ear and ear canal normal bilaterally, nose without deformity, nasal mucosa and turbinates normal without polyps  Neck: supple and non-tender without mass, no thyromegaly or thyroid nodules, no cervical lymphadenopathy  Pulmonary/Chest: clear to auscultation bilaterally- no wheezes   Cardiovascular: normal rate, regular rhythm, normal S1 and S2, no murmurs   Abdomen: soft, non-tender, non-distended, normal bowel sounds, no masses or organomegaly  Extremities: no cyanosis, clubbing    Musculoskeletal: normal range of motion, no joint swelling, deformity or tenderness  Neurologic: reflexes normal and symmetric, no cranial nerve deficit       LABS:  Recent Labs     12/08/20  1102 12/09/20  1615 12/09/20  2345   * 132* 131*   K 4.0 4.3 3.6   CL 95 96 99   CO2 23 24 18*   BUN 14 16 15   CREATININE 0.70 0.90 0.80   GLUCOSE 247* 200* 112*   CALCIUM 9.0 9.3 8.9       Recent Labs     12/08/20  1102 12/09/20  1615 12/09/20  2345   WBC 10.5 7.5 7.4   RBC 4.22* 4.39* 4.22*   HGB 13.0* 13.5* 13.0*   HCT 38.6* 39.6* 38.3*   MCV 91.5 90.2 90.7   MCH 30.7 30.8 30.9   MCHC 33.5 34.1 34.1   RDW 13.4 13.3 12.9    356 355       No results for input(s): POCGLU in the last 72 hours.     CBC with Differential:    Lab Results   Component Value Date    WBC 7.4 12/09/2020    RBC 4.22 12/09/2020    HGB 13.0 12/09/2020    HCT 38.3 12/09/2020     12/09/2020    MCV 90.7 12/09/2020    MCH 30.9 12/09/2020    MCHC 34.1 12/09/2020    RDW 12.9 12/09/2020    LYMPHOPCT 8.0 12/09/2020    MONOPCT 6.8 12/09/2020    BASOPCT 0.2 12/09/2020    MONOSABS 0.5 12/09/2020    LYMPHSABS 0.6 12/09/2020    EOSABS 0.0 12/09/2020    BASOSABS 0.0 12/09/2020     CMP:    Lab Results   Component Value Date     12/09/2020    K 3.6 12/09/2020    K 4.3 11/28/2020    CL 99 12/09/2020    CO2 18 12/09/2020    BUN 15 12/09/2020    CREATININE 0.80 12/09/2020    GFRAA >60.0 12/09/2020 LABGLOM >60.0 12/09/2020    GLUCOSE 112 12/09/2020    PROT 6.3 12/09/2020    LABALBU 3.3 12/09/2020    CALCIUM 8.9 12/09/2020    BILITOT 0.4 12/09/2020    ALKPHOS 67 12/09/2020    AST 28 12/09/2020    ALT 22 12/09/2020       Radiology: Xr Chest Portable    Result Date: 12/9/2020  EXAMINATION: XR CHEST PORTABLE CLINICAL HISTORY: HYPERVENTILATING COMPARISONS: None available. FINDINGS: Osseous structures intact. Cardiopericardial silhouette upper limits of normal. Lungs clear. Borderline cardiomegaly. EKG: Normal sinus rhythm    ASSESSMENT/ PLAN[de-identified]      Active Problems:    Pneumonia due to COVID-19 virus  Resolved Problems:    * No resolved hospital problems. *      1. COVID-19 pneumonia   Patient was sent due to labored breathing-currently not in distress   Intermittent hypoxia on room air. Currently on 3 L O2   He is on Xarelto for history of CVA-we will continue   Will give dexamethasone   Will check D-dimer, fibrinogen, CRP, ferritin   Will consult infectious disease and follow the recommendation regarding the need for remdesivir based on these labs  2. UTI    UA positive for UTI   Patient has altered mental status   Hematuria likely due to traumatic Polo catheter removal   Will treat with Rocephin for now   ID on consult  3. Hypotension   Blood pressure currently 81/56 with map less than 60 after 3 L normal saline boluses   Will continue IV fluids   Will start Levophed for goal above 65 map  4. Sinus bradycardia   1 episode with heart rate in the 30s in the ER requiring atropine   Another episode noted in the ICU-patient noted to be apneic when these bradycardic episodes happen   Questionable sleep apnea as a cause-we will place patient on BiPAP   Will place patient on dopamine given the hypotension as well   Will monitor under telemetry   will consult cardiology  5.  Altered mental status   Likely baseline-history of dementia and bipolar   Also might have been exacerbated by UTI and Covid 19   Will monitor    Code Status: Full  DVT prophylaxis: Xarelto          Electronically signed by Rodger Ormond, MD on 12/10/2020 at 4:04 AM      NOTE: This report was transcribed using voice recognition software. Every effort was made to ensure accuracy; however, inadvertent computerized transcription errors may be present.

## 2020-12-10 NOTE — ED PROVIDER NOTES
[address] [facility] ED  eMERGENCY dEPARTMENT eNCOUnter      Pt Name: Bridgette Reddy  MRN: [id number]  [place] 1950  Date of evaluation: 12/9/2020  Provider: Germaine Calderón PA-C    CHIEF COMPLAINT       Chief Complaint   Patient presents with    Shortness of Breath     covid +         HISTORY OF PRESENT ILLNESS   (Location/Symptom, Timing/Onset,Context/Setting, Quality, Duration, Modifying Factors, Severity)  Note limiting factors. Bridgette Reddy is a 65 y.o. male who presents to the emergency department patient sent from nursing home for increased work of breathing and fever. He is Covid positive per report. Patient was seen earlier today for catheter malfunction. Noted to have UTI. Was given Rocephin at that time. Patient has no complaints at this time he does have notable slight cough. Denies nausea vomiting. HPI    NursingNotes were reviewed. REVIEW OF SYSTEMS    (2-9 systems for level 4, 10 or more for level 5)     Review of Systems    Except as noted above the remainder of the review of systems was reviewed and negative.        PAST MEDICAL HISTORY     Past Medical History:   Diagnosis Date    Adrenal adenoma, right 11/2020    B[facility] 1 disorder ([facility]     Cancer of skin of left ear     cured    Hearing loss     History of CVA (cerebrovascular accident)     History of tracheostomy     Hyperlipidemia     Hypertension     Left renal mass 11/2020    MCI (mild cognitive impairment) 2019    Uncontrolled diabetes mellitus (HCC)          SURGICALHISTORY       Past Surgical History:   Procedure Laterality Date    AV FISTULA REPAIR      COLONOSCOPY  10/21/15    w/polypectomy     HERNIA REPAIR      TONSILLECTOMY      TRACHEOTOMY           CURRENT MEDICATIONS       Previous Medications    ALOGLIPTIN (NESINA) 25 MG TABS TABLET    Take 25 mg by mouth daily    ASPIRIN 81 MG TABLET    Take 81 mg by mouth daily    DIGOXIN (LANOXIN) 125 MCG TABLET    Take 1 tablet by mouth daily    GABAPENTIN (NEURONTIN) 300 MG CAPSULE    Take 300 mg by mouth 2 times daily.     INSULIN GLARGINE (LANTUS) 100 UNIT/ML INJECTION VIAL    Inject 6 Units into the skin 2 times daily    MEMANTINE (NAMENDA) 5 MG TABLET    Take 5 mg by mouth 2 times daily    METFORMIN (GLUCOPHAGE) 1000 MG TABLET    Take 1 tablet by mouth 2 times daily (with meals)    METOPROLOL TARTRATE (LOPRESSOR) 50 MG TABLET    Take 1 tablet by mouth every 8 hours    RIVAROXABAN (XARELTO) 20 MG TABS TABLET    Take 1 tablet by mouth daily    SIMVASTATIN PO    Take by mouth    SULFAMETHOXAZOLE-TRIMETHOPRIM (BACTRIM DS;SEPTRA DS) 800-160 MG PER TABLET    Take 1 tablet by mouth 2 times daily for 7 days    TAMSULOSIN (FLOMAX) 0.4 MG CAPSULE    Take 1 capsule by mouth daily    TAMSULOSIN (FLOMAX) 0.4 MG CAPSULE    Take 1 capsule by mouth daily       ALLERGIES     Pioglitazone    FAMILY HISTORY       Family History   Problem Relation Age of Onset    Diabetes Mother     Heart Disease Mother     Stroke Father     Heart Disease Father     Cancer Brother     Stroke Sister           SOCIAL HISTORY       Social History     Socioeconomic History    Marital status:      Spouse name: None    Number of children: None    Years of education: None    Highest education level: None   Occupational History    None   Social Needs    Financial resource strain: None    Food insecurity     Worry: None     Inability: None    Transportation needs     Medical: None     Non-medical: None   Tobacco Use    Smoking status: Former Smoker    Smokeless tobacco: Never Used   Substance and Sexual Activity    Alcohol use: No     Alcohol/week: 0.0 standard drinks    Drug use: No    Sexual activity: None   Lifestyle    Physical activity     Days per week: None     Minutes per session: None    Stress: None   Relationships    Social connections     Talks on phone: None     Gets together: None     Attends Muslim service: None     Active member of club or organization: None     Attends meetings of clubs or organizations: None     Relationship status: None    Intimate partner violence     Fear of current or ex partner: None     Emotionally abused: None     Physically abused: None     Forced sexual activity: None   Other Topics Concern    None   Social History Narrative    Born in [place], had 2 siblings, they both passed        One daughter in [place] 32 for the [employer] x 38 years       SCREENINGS      @FLOW([id number])@      PHYSICAL EXAM    (up to 7 for level 4, 8 or more for level 5)     ED Triage Vitals [12/09/20 2312]   BP Temp Temp Source Pulse Resp SpO2 Height Weight   -- 101 °F (38.3 °C) Oral 70 25 -- 6' (1.829 m) 220 lb (99.8 kg)       Physical Exam    DIAGNOSTIC RESULTS     EKG: All EKG's are interpreted by the Emergency Department Physician who either signs or Co-signsthis chart in the absence of a cardiologist.         RADIOLOGY:   Charma Bourdon such as CT, Ultrasound and MRI are read by the radiologist. Plain radiographic images are visualized and preliminarily interpreted by the emergency physician with the below findings:         Interpretation per the Radiologist below, if available at the time ofthis note:    No orders to display         ED BEDSIDE ULTRASOUND:   Performed by ED Physician - none    LABS:  Labs Reviewed   COMPREHENSIVE METABOLIC PANEL - Abnormal; Notable for the following components:       Result Value    Sodium 131 (*)     CO2 18 (*)     Glucose 112 (*)     Alb 3.3 (*)     All other components within normal limits   CBC WITH AUTO DIFFERENTIAL - Abnormal; Notable for the following components:    RBC 4.22 (*)     Hemoglobin 13.0 (*)     Hematocrit 38.3 (*)     Lymphocytes Absolute 0.6 (*)     All other components within normal limits   LACTIC ACID, PLASMA - Abnormal; Notable for the following components:    Lactic Acid 3.3 (*)     All other components within normal limits    Narrative:     CALL Anderson  [facility] tel. [phone],  Lactic Acid results called to and read back by Vianey MCCLENDON, 12/10/2020  00:18, by William Antony   MAGNESIUM - Abnormal; Notable for the following components:    Magnesium 1.5 (*)     All other components within normal limits   URINE RT REFLEX TO CULTURE - Abnormal; Notable for the following components:    Color, UA RED (*)     Clarity, UA TURBID (*)     Bilirubin Urine SMALL (*)     Ketones, Urine 15 (*)     Blood, Urine LARGE (*)     Protein, UA >=300 (*)     Nitrite, Urine POSITIVE (*)     Leukocyte Esterase, Urine MODERATE (*)     All other components within normal limits   MICROSCOPIC URINALYSIS - Abnormal; Notable for the following components:    RBC, UA  (*)     Bacteria, UA FEW (*)     All other components within normal limits   COVID-19 - Abnormal; Notable for the following components:    SARS-CoV-2, NAAT DETECTED (*)     All other components within normal limits    Narrative:     Molly Deleon tel. [phone],  COVID results called to and read back by ALAN MCCLENDON, 12/10/2020 01:43,  by [facility]   CULTURE, BLOOD 1   CULTURE, BLOOD 2   CULTURE, URINE   PROCALCITONIN       All other labs were within normal range or not returned as of this dictation. EMERGENCY DEPARTMENT COURSE and DIFFERENTIAL DIAGNOSIS/MDM:   Vitals:    Vitals:    12/09/20 2312 12/09/20 2330 12/10/20 0000 12/10/20 0030   BP:  (!) 95/53 (!) 95/50 (!) 91/49   Pulse: 70 69 64 72   Resp: 25 19 17 19   Temp: 101 °F (38.3 °C)      TempSrc: Oral      SpO2:  99% 98% 94%   Weight: 220 lb (99.8 kg)      Height: 6' (1.829 m)               MDM  Number of Diagnoses or Management Options  Diagnosis management comments: Pradip with Dr. Rajeev Charles. Patient has negative chest x-ray from today. He requests repeat Covid repeat urine test.  Patient was hypotensive 91/49 while discussing patient we added a second liter fluid elevated lactic acid. Patient has oxygen in place. When removed his saturations dropped to 82%. betsey Amount and/or Complexity of Data Reviewed  Clinical lab tests: reviewed and ordered  Tests in the radiology section of CPT®: reviewed  Discuss the patient with other providers: yes          CONSULTS:  None    PROCEDURES:  Unless otherwise noted below, none     Procedures    FINAL IMPRESSION      1. COVID-19    2. Urinary tract infection with hematuria, site unspecified    3. Hypotension, unspecified hypotension type          DISPOSITION/PLAN   DISPOSITION Admitted 12/10/2020 01:54:46 AM      PATIENT REFERRED TO:  No follow-up provider specified.     DISCHARGE MEDICATIONS:  New Prescriptions    No medications on file          (Please note that portions of this note were completed with a voice recognition program.  Efforts were made to edit the dictations but occasionally words are mis-transcribed.)    Mira Brunson PA-C (electronically signed)  Attending Emergency Physician       Mira Brunson PA-C  12/10/20 6613

## 2020-12-10 NOTE — ED NOTES
Transport to [facility] [employer] [facility], [facility]  12/10/20 3882

## 2020-12-10 NOTE — ED TRIAGE NOTES
Patient is +covid, from covid unit, seen earlier today due to he pulled out his [place] cath, sent back from ECF due to \"labored breathing\".   Patient is tachypnic, febrile, BP 82/53, but denies complaints

## 2020-12-10 NOTE — PROGRESS NOTES
Patient seen and examined, discussed with patient RN.   Agree with plan as documented by my colleague's note at 4 am.

## 2020-12-10 NOTE — ED PROVIDER NOTES
Addendum to patient's chart. Patient was being admitted to the ICU and I was called back to the room for a marked bradycardia. Patient is awake with a sinus bradycardia of 36. He then moves up to the mid 40s range and back down to the 30s. EKG demonstrates sinus bradycardia significant with a rate of 36. He has no ischemic changes. Normal axis. Abnormal EKG. I ordered 1 mg atropine and the patient's heart rate went into the mid 60s and is maintained there. He will need cardiology consultation. He has been borderline hypotensive so I ordered an additional liter of fluid to help with his management. Again he is awake and alert.      Maribeth Fisher MD  12/10/20 [id number]

## 2020-12-10 NOTE — ED NOTES
Patients magnesium was set to run over 2 hours on pump due to blood pressure already running low, BP has dropped anyway, magnesium stopped at this time, [facility]  12/10/20 8739

## 2020-12-10 NOTE — CARE COORDINATION
[facility] Case Management Initial Discharge Assessment    The LSW talked to the patient's daughter, Francisco Duarte, regarding the discharge plan. The patient's daughter verbally signed the IMM and expressed an understanding of the medicare appeal process. The discharge plan is for the patient to return to [facility]. Await PT/OT. The patient will need a precert to return. The patient was from the covid unit at [facility]. PCP: Dm Garcia, APRN - CNP                                Date of Last Visit: The patient has been at [facility]. If no PCP, list provided? N/A    Discharge Planning    Living Arrangements: From [facility]    Who do you live with? From [facility]    Who helps you with your care:  Staff at [facility]    If lives at home:     Do you have any barriers navigating in your home? Patient can perform ADL? Staff at [facility] (outpatient and in home) :  From [facility]    Dialysis: No    Is transportation available to get to your appointments? Yes    DME Equipment:  Per the patient's daughter, Francisco Duarte, the patient did not have any equipment prior to being at the SNF ([facility]). Respiratory equipment: Not at home - prior to SNF stay    Respiratory provider:       Pharmacy:  [facility] [address]  [place] [address] with Medication Assistance Program?  No      Patient agreeable to [facility] [place]? N/A    Patient agreeable to SNF/Rehab? Per the patient's daughter, the patient will return to [facility] when medically stable. Per Earle Hines, the patient is short term and not a bed hold. Await PT/OT. The patient will need a precert to return. The patient was from the covid area at the facility. Other discharge needs identified? Palliative Care?     [place] of choice list provided with basic dialogue that supports the patient's individualized plan of care/goals and shares the quality data associated with the providers. N/A     Does Patient Have a High-Risk for Readmission Diagnosis (CHF, PN, MI, COPD)? The plan for Transition of Care is related to the following treatment goals:     Initial Discharge Plan? (Note: please see concurrent daily documentation for any updates after initial note). Await PT/OT. The patient is a precert to return to [address] [place] [address]. The Patient and/or patient representative: The patient's daughter, Flores Figueroa, was provided with choice of any post-acute providers for care and equipment and agrees with discharge plan  N/A    DCCOP was completed. The patient is a medium risk (yellow) for readmission. Per the [facility]'s daughter and [facility] , patient had an appointment for next Tuesday for capacity eval.  The patient's daughter may fax the paperwork to the hospital (in case the capacity eval is able to be completed at the hospital). Daughter may also fax the [address]. The LSW updated the patient's RN.      Electronically signed by Gabby Thomas on 12/10/2020 at 12:23 PM

## 2020-12-10 NOTE — ACP (ADVANCE CARE PLANNING)
The patient's daughter, Paulo Andrews, states the patient has a HCPOA. The patient's daughter states she will fax the HCPOA to the hospital.  The daughter states the names on the HCPOA are the patient's girlfriend and the daughter. Per the [place]  and the patient's daughter, the patient had an appointment for a capacity eval next week. The patient's daughter states she is working on guardianship for the patient. The LSW updated the patient's ICU RN.  Electronically signed by BELEN Pearce on 12/10/20 at 12:48 PM EST

## 2020-12-10 NOTE — CONSULTS
rash on exposed extremities. .      Data Review  Recent Labs     12/09/20  1615 12/09/20  2345 12/10/20  0416   WBC 7.5 7.4 6.4   HGB 13.5* 13.0* 12.0*   HCT 39.6* 38.3* 35.3*    355 314      Recent Labs     12/09/20  1615 12/09/20  2345 12/10/20  0416   * 131* 138   K 4.3 3.6 4.1   CL 96 99 107   CO2 24 18* 20   BUN 16 15 15   CREATININE 0.90 0.80 0.68*   GLUCOSE 200* 112* 200*       MV Settings:     Vt Ordered: 6 mL    ABGs: No results for input(s): PHART, OCX6UJZ, PO2ART, SBI3QAH, BEART, N6UFXTUW, NFX3FET in the last 72 hours. O2 Device: Nasal cannula  O2 Flow Rate (L/min): 4 L/min  Lab Results   Component Value Date    LACTA 1.2 12/10/2020    LACTA 3.3 12/09/2020    LACTA 1.2 11/23/2020       Radiology  Xr Chest (2 Vw)    Result Date: 11/22/2020  XR CHEST (2 VW) : 11/21/2020 CLINICAL HISTORY:  mental status change . COMPARISON: Portable chest 8/10/2012 and two-view chest 7/19/2005. TECHNIQUE: Upright AP and lateral radiographs of the chest were obtained. FINDINGS: A poor inspiratory volume is present with mild probable bibasilar atelectasis. Bronchopneumonia should be excluded clinically. There is no cardiomegaly, significant pleural effusion, vascular congestion, pneumothorax, or displaced fractures identified. POOR INSPIRATION WITH MILD PROBABLE BIBASILAR ATELECTASIS. BRONCHOPNEUMONIA SHOULD BE EXCLUDED CLINICALLY. Ct Head Wo Contrast    Result Date: 11/22/2020  CT Brain Contrast medium:  Not utilized. History: Altered mental status Comparison:  CT brain, August 10, 2012 MRI brain, August 12, 2012 Findings: Extra-axial spaces:  Normal. Intracranial hemorrhage:  None. Ventricular system: Ventricles mildly enlarged. Sulci mildly prominent. Basal Cisterns:  Normal. Cerebral Parenchyma: Bilateral symmetric periventricular areas decreased attenuation. Midline Shift:  None.  Cerebellum:  Normal. Paranasal sinuses and mastoid air cells:  Normal. Visualized Orbits:  Normal.     Impression: No acute findings. Mild cerebral atrophy. Chronic ischemic white matter disease. All CT scans at this facility use dose modulation, iterative reconstruction, and/or weight based dosing when appropriate to reduce radiation dose to as low as reasonably achievable. Mri Lumbar Spine Wo Contrast    Result Date: 11/23/2020  EXAMINATION: MRI LUMBAR SPINE WO CONTRAST CLINICAL HISTORY:  lumbar stenosis COMPARISONS: Lumbar spine MRI from August 12, 2012 TECHNIQUE: Multiplanar multisequence images of the lumbar spine were obtained without contrast. FINDINGS:  The spine is in anatomic alignment. There is no acute fracture. There is preservation of the vertebral body heights. The intervertebral discs are unremarkable from L1 to L3. There is disassociation moderate disc space narrowing with fissures at L3-4, L4-5, and L5-S1. The presence of annular fissures may be a sensitive pain generator at these levels. . There is a vertebral body hemangioma at L4 there is mild increased marrow signal involving the inferior articular surfaces of L5 which may indicate edema and may also be a specific pain  generator. The distal cord and conus medullaris are within normal limits. The cauda equina is unremarkable. The retroperitoneal structures demonstrate a well-defined 5 cm inhomogeneous mass arising from the inferior pole of the left kidney which is worrisome for a renal cell carcinoma. T12-L1: There is no disc herniation, central canal narrowing, or neural foraminal narrowing. L1-2: There is no disc herniation, central canal narrowing, or neural foraminal narrowing. L2-3: There is no disc herniation, central canal narrowing, or neural foraminal narrowing. There is mild bilateral facet arthrosis. L3-4: There is a 4 mm asymmetric disc bulge as well as moderate bilateral facet arthrosis and moderate ligamentum flavum hypertrophy producing severe narrowing of the central canal. AP diameter of the thecal sac is 6 mm.  There is moderate right and mild left neural foraminal narrowing. L4-5: There is a 3 mm symmetric disc bulges all areas mild bilateral facet arthrosis and mild ligamentum flavum hypertrophy with mild narrowing of the central canal and mild bilateral neural foraminal narrowing. L5-S1: There is a 5 mm symmetric disc bulge as well as its mild to moderate left facet arthrosis without ligamentum flavum hypertrophy. There is mild narrowing of central canal. There is mild right and moderate left neural foraminal narrowing. 1. There is no acute fracture or subluxation. The distal cord, conus medullaris, and cauda equina are within normal limits. 2. There is a 5 cm well-circumscribed inhomogeneous mass arising from the inferior pole of the left kidney which is worrisome for renal cell carcinoma. Dedicated renal imaging with CT urogram or renal MRI should be considered. 3. There is multilevel spondylosis from L3 to S1 with disc desiccation, disc space narrowing, and annular fissures which may be specifically generators at these levels. There is mild edema involving the articular surfaces at L5-S1 which may also be a specific pain generator. 4. At L3-4 there is severe central canal narrowing with AP diameter of thecal sac of 6 mm and a multifactorial basis. There is moderate right and mild left neural foraminal narrowing. The remaining levels do not demonstrate significant central canal narrowing or foraminal narrowing. 5. At L3-4 there is moderate bilateral facet arthrosis. This may correlate with facet syndrome, back pain radiating inferiorly to the buttocks and posterior thighs. In the appropriate clinical setting these may be potential targets for image guided therapeutic injections. Findings were called to 2 W. and the finding of left renal tumor was briefly discussed with Federica Macario RN. Xr Chest Portable    Result Date: 12/9/2020  EXAMINATION: XR CHEST PORTABLE CLINICAL HISTORY: HYPERVENTILATING COMPARISONS: None available.  FINDINGS: Prox VERT:  66 cm/s              ICA/CCA    0.70, which indicates less than 50% narrowing by velocity criteria. LEFT Peak Systolic Prox CCA:  343 cm/s             Mid CCA:  117 cm/s              Dist CCA:  123 cm/s              Prox ICA:  107 cm/s               Mid ICA:  86 cm/s            Dist ICA:  87 cm/s             Prox ECA:  83 cm/s             Prox VERT:  47 cm/s                 ICA/CCA     0.91, which indicates less than 50% narrowing by velocity criteria. MILD TO MODERATE ATHEROSCLEROTIC PLAQUING OF THE CAROTID BULBS WITHOUT EVIDENCE OF A FLOW-LIMITING STENOSIS. Assessment, plan:   1. Acute hypoxic respiratory failure, etiology is probably a combination of sepsis with lung injury, recent COVID-19 viral infection but no clear evidence of viral pneumonia, congestive heart failure, and obstructive sleep apnea. Patient improved with BiPAP and currently switched to nasal cannula, he tolerates that very well  2. Urinary tract infection  3. Shock associated with sepsis, relative volume depletion, and COVID-19 viral infection could also be contributing  4.  Obstructive sleep apnea patient had witnessed apneas initially, seem to improve neurologically with BiPAP use, will need evaluation and management long-term especially in light of his underlying neurologic disease including previous strokes  Continue current treatment, empiric antibiotic treatment, follow-up x-ray tomorrow, wean down vasopressors, monitor hemodynamic status and rhythm, cardiology evaluation, may need permanent pacemaker placement if he continues with these bradycardic episodes, patient had been on digoxin and beta-blockers which are being held, his dig level was adequate 0.9        Electronically signed by Alisia Walls MD, [facility] on 12/10/2020 at 2:21 PM

## 2020-12-10 NOTE — PROGRESS NOTES
8492- admitted to tcu bed 8 from ed. On 2L via NC. 3rd liter of iv ns currently infusing. Stable, no sob or signs of distress. Does appear lethargic, easily arousable but for very short period of time. Noted with open areas to scrotum and sacral region which had an old dressing from nursing home. 0400- dr Shirley Charlton at bedside. Gave new orders for stat labs and to start levo    0430- bradycardic, dr Shirley Charlton aware and at bedside. O2 increased to 5L. ekg completed and levophed started. 46- continues to be ac, hr irregular. Noted to have periods of apnea. Dr Shirley Charlton remains at bedside. Gave order to start dopamine and bipap. Respiratory therapy aware    0520- stat abg completed. bipap in place    0530- labs reviewed. New order for iv mag    0550- d dimer elevated. Dr Shirley Charlton aware. New order for cta chest to r/o pe    0650- pulling at bipap and iv tubing. Order received for bilateral soft wrist restraints.

## 2020-12-10 NOTE — ED NOTES
Bed: 20  Expected date: 12/9/20  Expected time: 10:45 PM  Means of arrival: [facility]  Comments:  65 m sob, covid positive. VSS.  Temp rectal 103.5        Denver Schooner, RN  12/09/20 9899

## 2020-12-10 NOTE — ED NOTES
Patient taken off of oxygen for RA saO2, hypoxic on RA at 83-88%     [facility], RN  12/10/20 2366

## 2020-12-11 ENCOUNTER — APPOINTMENT (OUTPATIENT)
Dept: GENERAL RADIOLOGY | Age: 70
DRG: 871 | End: 2020-12-11
Payer: MEDICARE

## 2020-12-11 LAB
ALBUMIN SERPL-MCNC: 2.8 G/DL (ref 3.5–4.6)
ALP BLD-CCNC: 59 U/L (ref 35–104)
ALT SERPL-CCNC: 21 U/L (ref 0–41)
ANION GAP SERPL CALCULATED.3IONS-SCNC: 13 MEQ/L (ref 9–15)
AST SERPL-CCNC: 24 U/L (ref 0–40)
BASOPHILS ABSOLUTE: 0 K/UL (ref 0–0.2)
BASOPHILS RELATIVE PERCENT: 0.1 %
BILIRUB SERPL-MCNC: 0.3 MG/DL (ref 0.2–0.7)
BUN BLDV-MCNC: 15 MG/DL (ref 8–23)
CALCIUM SERPL-MCNC: 8.1 MG/DL (ref 8.5–9.9)
CHLORIDE BLD-SCNC: 107 MEQ/L (ref 95–107)
CO2: 19 MEQ/L (ref 20–31)
CREAT SERPL-MCNC: 0.58 MG/DL (ref 0.7–1.2)
EOSINOPHILS ABSOLUTE: 0 K/UL (ref 0–0.7)
EOSINOPHILS RELATIVE PERCENT: 0 %
GFR AFRICAN AMERICAN: >60
GFR NON-AFRICAN AMERICAN: >60
GLOBULIN: 3.5 G/DL (ref 2.3–3.5)
GLUCOSE BLD-MCNC: 182 MG/DL (ref 60–115)
GLUCOSE BLD-MCNC: 186 MG/DL (ref 60–115)
GLUCOSE BLD-MCNC: 224 MG/DL (ref 70–99)
GLUCOSE BLD-MCNC: 276 MG/DL (ref 60–115)
HCT VFR BLD CALC: 39 % (ref 42–52)
HEMOGLOBIN: 13.4 G/DL (ref 14–18)
LYMPHOCYTES ABSOLUTE: 0.6 K/UL (ref 1–4.8)
LYMPHOCYTES RELATIVE PERCENT: 11.2 %
MAGNESIUM: 1.9 MG/DL (ref 1.7–2.4)
MCH RBC QN AUTO: 31 PG (ref 27–31.3)
MCHC RBC AUTO-ENTMCNC: 34.4 % (ref 33–37)
MCV RBC AUTO: 90.3 FL (ref 80–100)
MONOCYTES ABSOLUTE: 0.6 K/UL (ref 0.2–0.8)
MONOCYTES RELATIVE PERCENT: 10.6 %
NEUTROPHILS ABSOLUTE: 4.4 K/UL (ref 1.4–6.5)
NEUTROPHILS RELATIVE PERCENT: 78.1 %
PDW BLD-RTO: 13.3 % (ref 11.5–14.5)
PERFORMED ON: ABNORMAL
PLATELET # BLD: 377 K/UL (ref 130–400)
POTASSIUM SERPL-SCNC: 3.7 MEQ/L (ref 3.4–4.9)
RBC # BLD: 4.32 M/UL (ref 4.7–6.1)
SODIUM BLD-SCNC: 139 MEQ/L (ref 135–144)
TOTAL PROTEIN: 6.3 G/DL (ref 6.3–8)
WBC # BLD: 5.6 K/UL (ref 4.8–10.8)

## 2020-12-11 PROCEDURE — 83735 ASSAY OF MAGNESIUM: CPT

## 2020-12-11 PROCEDURE — 36415 COLL VENOUS BLD VENIPUNCTURE: CPT

## 2020-12-11 PROCEDURE — 6360000002 HC RX W HCPCS: Performed by: INTERNAL MEDICINE

## 2020-12-11 PROCEDURE — 94761 N-INVAS EAR/PLS OXIMETRY MLT: CPT

## 2020-12-11 PROCEDURE — 71045 X-RAY EXAM CHEST 1 VIEW: CPT

## 2020-12-11 PROCEDURE — 93005 ELECTROCARDIOGRAM TRACING: CPT | Performed by: INTERNAL MEDICINE

## 2020-12-11 PROCEDURE — 6370000000 HC RX 637 (ALT 250 FOR IP): Performed by: INTERNAL MEDICINE

## 2020-12-11 PROCEDURE — 2580000003 HC RX 258: Performed by: INTERNAL MEDICINE

## 2020-12-11 PROCEDURE — 94660 CPAP INITIATION&MGMT: CPT

## 2020-12-11 PROCEDURE — 99233 SBSQ HOSP IP/OBS HIGH 50: CPT | Performed by: INTERNAL MEDICINE

## 2020-12-11 PROCEDURE — 2500000003 HC RX 250 WO HCPCS: Performed by: INTERNAL MEDICINE

## 2020-12-11 PROCEDURE — 85025 COMPLETE CBC W/AUTO DIFF WBC: CPT

## 2020-12-11 PROCEDURE — 2700000000 HC OXYGEN THERAPY PER DAY

## 2020-12-11 PROCEDURE — 99212 OFFICE O/P EST SF 10 MIN: CPT

## 2020-12-11 PROCEDURE — 2000000000 HC ICU R&B

## 2020-12-11 PROCEDURE — 80053 COMPREHEN METABOLIC PANEL: CPT

## 2020-12-11 PROCEDURE — 99232 SBSQ HOSP IP/OBS MODERATE 35: CPT | Performed by: INTERNAL MEDICINE

## 2020-12-11 PROCEDURE — 99223 1ST HOSP IP/OBS HIGH 75: CPT | Performed by: INTERNAL MEDICINE

## 2020-12-11 RX ORDER — METOPROLOL TARTRATE 5 MG/5ML
5 INJECTION INTRAVENOUS EVERY 6 HOURS PRN
Status: DISCONTINUED | OUTPATIENT
Start: 2020-12-11 | End: 2020-12-16 | Stop reason: HOSPADM

## 2020-12-11 RX ORDER — 0.9 % SODIUM CHLORIDE 0.9 %
1000 INTRAVENOUS SOLUTION INTRAVENOUS ONCE
Status: COMPLETED | OUTPATIENT
Start: 2020-12-11 | End: 2020-12-11

## 2020-12-11 RX ORDER — LANOLIN ALCOHOL/MO/W.PET/CERES
400 CREAM (GRAM) TOPICAL 2 TIMES DAILY
Status: DISCONTINUED | OUTPATIENT
Start: 2020-12-11 | End: 2020-12-16 | Stop reason: HOSPADM

## 2020-12-11 RX ORDER — METOPROLOL TARTRATE 5 MG/5ML
5 INJECTION INTRAVENOUS ONCE
Status: COMPLETED | OUTPATIENT
Start: 2020-12-11 | End: 2020-12-11

## 2020-12-11 RX ORDER — INSULIN GLARGINE 100 [IU]/ML
12 INJECTION, SOLUTION SUBCUTANEOUS 2 TIMES DAILY
Status: DISCONTINUED | OUTPATIENT
Start: 2020-12-11 | End: 2020-12-15

## 2020-12-11 RX ADMIN — INSULIN GLARGINE 12 UNITS: 100 INJECTION, SOLUTION SUBCUTANEOUS at 20:31

## 2020-12-11 RX ADMIN — Medication 400 MG: at 20:48

## 2020-12-11 RX ADMIN — DEXAMETHASONE SODIUM PHOSPHATE 6 MG: 10 INJECTION INTRAMUSCULAR; INTRAVENOUS at 06:03

## 2020-12-11 RX ADMIN — SODIUM CHLORIDE: 9 INJECTION, SOLUTION INTRAVENOUS at 10:18

## 2020-12-11 RX ADMIN — DOPAMINE HYDROCHLORIDE 2.5 MCG/KG/MIN: 160 INJECTION, SOLUTION INTRAVENOUS at 05:07

## 2020-12-11 RX ADMIN — METOPROLOL TARTRATE 5 MG: 1 INJECTION, SOLUTION INTRAVENOUS at 09:07

## 2020-12-11 RX ADMIN — PIPERACILLIN AND TAZOBACTAM 3.38 G: 3; .375 INJECTION, POWDER, LYOPHILIZED, FOR SOLUTION INTRAVENOUS at 16:35

## 2020-12-11 RX ADMIN — ENOXAPARIN SODIUM 100 MG: 100 INJECTION SUBCUTANEOUS at 08:44

## 2020-12-11 RX ADMIN — SODIUM CHLORIDE 1000 ML: 9 INJECTION, SOLUTION INTRAVENOUS at 05:52

## 2020-12-11 RX ADMIN — ENOXAPARIN SODIUM 100 MG: 100 INJECTION SUBCUTANEOUS at 20:48

## 2020-12-11 RX ADMIN — Medication 10 ML: at 09:08

## 2020-12-11 RX ADMIN — MEMANTINE HYDROCHLORIDE 5 MG: 5 TABLET ORAL at 09:07

## 2020-12-11 RX ADMIN — CEFTRIAXONE SODIUM 1 G: 1 INJECTION, POWDER, FOR SOLUTION INTRAMUSCULAR; INTRAVENOUS at 00:19

## 2020-12-11 RX ADMIN — Medication 400 MG: at 09:07

## 2020-12-11 RX ADMIN — METOROPROLOL TARTRATE 5 MG: 5 INJECTION, SOLUTION INTRAVENOUS at 07:00

## 2020-12-11 RX ADMIN — INSULIN GLARGINE 12 UNITS: 100 INJECTION, SOLUTION SUBCUTANEOUS at 12:51

## 2020-12-11 RX ADMIN — MEMANTINE HYDROCHLORIDE 5 MG: 5 TABLET ORAL at 20:48

## 2020-12-11 RX ADMIN — REMDESIVIR 100 MG: 5 INJECTION INTRAVENOUS at 16:35

## 2020-12-11 RX ADMIN — Medication 10 ML: at 20:49

## 2020-12-11 RX ADMIN — PIPERACILLIN AND TAZOBACTAM 3.38 G: 3; .375 INJECTION, POWDER, LYOPHILIZED, FOR SOLUTION INTRAVENOUS at 22:57

## 2020-12-11 ASSESSMENT — PAIN SCALES - GENERAL
PAINLEVEL_OUTOF10: 0

## 2020-12-11 ASSESSMENT — ENCOUNTER SYMPTOMS
COUGH: 0
SHORTNESS OF BREATH: 1
GASTROINTESTINAL NEGATIVE: 1

## 2020-12-11 NOTE — CONSULTS
Chief Complaint   Patient presents with    Shortness of Breath     covid +        Patient is a 65 y.o. male who presents with a chief complaint of sob. . Patient is followed on a regular basis by Dr. Jhonny Vazquez, JERRICA - CNP. Patient is currently seen in the ICU. Tested positive for COVID-19 pneumonia. He is on BiPAP at this time. Patient with history of paroxysmal atrial fibrillation on oral anticoagulation. He was noted to have heart rates in the 30s in the emergency department placed on dopamine drip. Currently he developed atrial fibrillation with rapid ventricle response with heart rate into the 60s in ICU. He is currently off of Levophed and dopamine drips. His potassium is 3.7, magnesium is 1.9. Patient with past medical history of diabetes, hyperlipidemia, hypertension, CVA and paroxysmal atrial fibrillation. Patient initially developed atrial fibrillation during hospitalization for CVA in November 2020. Echocardiogram performed at that time showed ejection-60%, mild LVH, grade 1 diastolic dysfunction.     Past Medical History:   Diagnosis Date    Adrenal adenoma, right 11/2020    Bipolar 1 disorder ([address]     Cancer of skin of left ear     cured    Hearing loss     History of CVA (cerebrovascular accident)     History of tracheostomy     Hyperlipidemia     Hypertension     Left renal mass 11/2020    MCI (mild cognitive impairment) 2019    Paroxysmal A-fib (HCC)     Uncontrolled diabetes mellitus (HCC)       Patient Active Problem List   Diagnosis    Bipolar 1 disorder ([address]    Diabetes ([address]    Pneumonia    Dementia ([address]    Renal mass, left    Urinary retention    Spinal stenosis of lumbar region with neurogenic claudication    Weakness    Paroxysmal atrial fibrillation (HCC)    Acquired cavus deformity of foot    Adhesive capsulitis of shoulder    Diabetic peripheral neuropathy (HCC)    Mild cognitive disorder    Pneumonia due to COVID-19 virus       Past Surgical History:   Procedure Laterality Date    AV FISTULA REPAIR      COLONOSCOPY  10/21/15    w/polypectomy     HERNIA REPAIR      TONSILLECTOMY      TRACHEOTOMY         Social History     Socioeconomic History    Marital status:      Spouse name: None    Number of children: None    Years of education: None    Highest education level: None   Occupational History    None   Social Needs    Financial resource strain: None    Food insecurity     Worry: None     Inability: None    Transportation needs     Medical: None     Non-medical: None   Tobacco Use    Smoking status: Former Smoker    Smokeless tobacco: Never Used   Substance and Sexual Activity    Alcohol use: No     Alcohol/week: 0.0 standard drinks    Drug use: No    Sexual activity: None   Lifestyle    Physical activity     Days per week: None     Minutes per session: None    Stress: None   Relationships    Social connections     Talks on phone: None     Gets together: None     Attends Yarsanism service: None     Active member of club or organization: None     Attends meetings of clubs or organizations: None     Relationship status: None    Intimate partner violence     Fear of current or ex partner: None     Emotionally abused: None     Physically abused: None     Forced sexual activity: None   Other Topics Concern    None   Social History Narrative    Born [place], had 2 siblings, they both passed        One daughter in [place] [id number] for the [employer] x 45 years       Family History   Problem Relation Age of Onset    Diabetes Mother     Heart Disease Mother     Stroke Father     Heart Disease Father     Cancer Brother     Stroke Sister        Current Facility-Administered Medications   Medication Dose Route Frequency Provider Last Rate Last Dose    dexamethasone (PF) (DECADRON) injection 6 mg  6 mg Intravenous Q24H Chinedu Smith MD   6 mg at 12/11/20 0603    cefTRIAXone (ROCEPHIN) 1 g IVPB in 50 mL D5W minibag  1 g Intravenous Q24H Christian Richter MD   Stopped at 12/11/20 0049    norepinephrine (LEVOPHED) 16 mg in dextrose 5 % 250 mL infusion  2 mcg/min Intravenous Continuous Christian Richter MD   Stopped at 12/10/20 1719    0.9 % sodium chloride infusion   Intravenous Continuous Christian Richter MD   Stopped at 12/10/20 0647    insulin lispro (HUMALOG) injection vial 0-12 Units  0-12 Units Subcutaneous TID WC Christian Richter MD   8 Units at 12/10/20 1613    insulin lispro (HUMALOG) injection vial 0-6 Units  0-6 Units Subcutaneous Nightly Christian Richter MD   3 Units at 12/10/20 2117    memantine (NAMENDA) tablet 5 mg  5 mg Oral BID Christian Richter MD   5 mg at 12/10/20 2101    insulin glargine (LANTUS) injection vial 6 Units  6 Units Subcutaneous BID Christian Richter MD   6 Units at 12/10/20 2118    glucose (GLUTOSE) 40 % oral gel 15 g  15 g Oral PRN Christian Richter MD        dextrose 50 % IV solution  12.5 g Intravenous PRN Christian Richter MD        glucagon (rDNA) injection 1 mg  1 mg Intramuscular PRN Christian Richter MD        dextrose 5 % solution  100 mL/hr Intravenous PRN Christian Richter MD        sodium chloride flush 0.9 % injection 10 mL  10 mL Intravenous 2 times per day Christian Richter MD   10 mL at 12/10/20 2101    sodium chloride flush 0.9 % injection 10 mL  10 mL Intravenous PRN Christian Richter MD        acetaminophen (TYLENOL) tablet 650 mg  650 mg Oral Q6H PRN Christian Richter MD        Or   Marmolejo acetaminophen (TYLENOL) suppository 650 mg  650 mg Rectal Q6H PRN Christian Richter MD        polyethylene glycol (GLYCOLAX) packet 17 g  17 g Oral Daily PRN Christian Richter MD        promethazine (PHENERGAN) tablet 12.5 mg  12.5 mg Oral Q6H PRN Christian Richter MD        Or    ondansetron [facility]) injection 4 mg  4 mg Intravenous Q6H PRN Christian Richter MD        DOPamine (INTROPIN) 400 mg in dextrose 5 % 250 mL infusion  2.5 mcg/kg/min Intravenous Continuous Christian Richter MD   Stopped at 12/11/20 0530    magnesium sulfate 2 g in 50 mL IVPB premix  2 g Intravenous Once Nicolle Curtis MD        enoxaparin (LOVENOX) injection 100 mg  1 mg/kg Subcutaneous BID Nicolle Curtis MD   100 mg at 12/10/20 2100    remdesivir 100 mg in sodium chloride 0.9 % 250 mL IVPB  100 mg Intravenous Q24H Sary Qiu MD           ALLERGIES: Pioglitazone    Review of Systems   Unable to perform ROS: Acuity of condition         VITALS:  Blood pressure (!) 85/54, pulse 171, temperature 98.8 °F (37.1 °C), temperature source Oral, resp. rate 29, height 6' (1.829 m), weight 220 lb (99.8 kg), SpO2 97 %. Body mass index is 29.84 kg/m². Physical Exam  As per H&P per internal medicine, limited exposure to COVID-19 pandemic.   LABS:  Recent Results (from the past 24 hour(s))   POCT Glucose    Collection Time: 12/10/20  4:12 PM   Result Value Ref Range    POC Glucose 309 (H) 60 - 115 mg/dl    Performed on ACCU-CHEK    POCT Glucose    Collection Time: 12/10/20  8:34 PM   Result Value Ref Range    POC Glucose 278 (H) 60 - 115 mg/dl    Performed on ACCU-CHEK    Comprehensive Metabolic Panel    Collection Time: 12/11/20  5:14 AM   Result Value Ref Range    Sodium 139 135 - 144 mEq/L    Potassium 3.7 3.4 - 4.9 mEq/L    Chloride 107 95 - 107 mEq/L    CO2 19 (L) 20 - 31 mEq/L    Anion Gap 13 9 - 15 mEq/L    Glucose 224 (H) 70 - 99 mg/dL    BUN 15 8 - 23 mg/dL    CREATININE 0.58 (L) 0.70 - 1.20 mg/dL    GFR Non-African American >60.0 >60    GFR  >60.0 >60    Calcium 8.1 (L) 8.5 - 9.9 mg/dL    Total Protein 6.3 6.3 - 8.0 g/dL    Alb 2.8 (L) 3.5 - 4.6 g/dL    Total Bilirubin 0.3 0.2 - 0.7 mg/dL    Alkaline Phosphatase 59 35 - 104 U/L    ALT 21 0 - 41 U/L    AST 24 0 - 40 U/L    Globulin 3.5 2.3 - 3.5 g/dL   CBC Auto Differential    Collection Time: 12/11/20  5:14 AM   Result Value Ref Range    WBC 5.6 4.8 - 10.8 K/uL    RBC 4.32 (L) 4.70 - 6.10 M/uL    Hemoglobin 13.4 (L) 14.0 - 18.0 g/dL    Hematocrit 39.0 (L) 42.0 - 52.0 %    MCV 90.3 80.0 - 100.0 fL    MCH 31.0 27.0 - 31.3 pg    MCHC 34.4 33.0 - 37.0 %    RDW 13.3 11.5 - 14.5 %    Platelets 208 786 - 928 K/uL    Neutrophils % 78.1 %    Lymphocytes % 11.2 %    Monocytes % 10.6 %    Eosinophils % 0.0 %    Basophils % 0.1 %    Neutrophils Absolute 4.4 1.4 - 6.5 K/uL    Lymphocytes Absolute 0.6 (L) 1.0 - 4.8 K/uL    Monocytes Absolute 0.6 0.2 - 0.8 K/uL    Eosinophils Absolute 0.0 0.0 - 0.7 K/uL    Basophils Absolute 0.0 0.0 - 0.2 K/uL   Magnesium    Collection Time: 12/11/20  5:14 AM   Result Value Ref Range    Magnesium 1.9 1.7 - 2.4 mg/dL   EKG 12 Lead    Collection Time: 12/11/20  5:44 AM   Result Value Ref Range    Ventricular Rate 178 BPM    Atrial Rate 166 BPM    QRS Duration 102 ms    Q-T Interval 244 ms    QTc Calculation (Bazett) 420 ms    R Axis 80 degrees    T Axis 236 degrees     Troponin:   Lab Results   Component Value Date    TROPONINI <0.010 11/24/2020             ASSESSMENT:    Paroxysmal atrial fibrillation currently A. fib with RVR  Sick sinus syndrome with episode of bradycardia into the 30s  COVID-19 pneumonia  Normal LV function  History of CVA  Essential hypertension  Hyperlipidemia  History of diabetes        PLAN:   1. As always, aggressive risk factor modification is strongly recommended. We should adhere to the JNC VIII guidelines for HTN management and the NCEPATP III guidelines for LDL-C management. 2. Hold negative chronotropic for now. Only give as needed IV Lopressor. Would rather have patient's heart rate faster than slower. 3. Will eventually require permanent pacemaker placement. If needed will insert transvenous pacemaker plan  4. Continue with full dose Lovenox  5. Kerp potassium greater than 4, magnesium greater than 2  6. GI/DVT prophylaxis  7. ICU supportive care and management. Thank you for allowing me to participate in the care of your patient, please don't hesitate to contact me if you have any further questions.     Electronically signed by Tao Conway DO on 12/11/2020 at 8:10 AM

## 2020-12-11 NOTE — PROGRESS NOTES
Infectious Disease     Patient Name: Raliegh Boast  Date: 12/11/2020  YOB: 1950  Medical Record Number: [id number]      Sepsis     Covid 19 infection with hypoxia    Probable UTI        History of Present Illness:  Adrenal adenoma bipolar surgery skin cancer CVA  Hyperlipidemia hypertension diabetes  Dementia      Comes from nursing home diagnosed with COVID-19 becoming progressively more dyspneic  Repeat COVID-19 testing positive received fluid resuscitation hypertension and norepinephrine  Also developing bradycardia  Maintaining saturations with BiPAP         Chest x-ray shows cardiomegaly with clear lung fields    Currently on ceftriaxone and Decadron          Review of Systems   Constitutional: Negative for chills, diaphoresis and fatigue. Patient is poor historian confused   Respiratory: Positive for shortness of breath. Negative for cough. Gastrointestinal: Negative. Physical Exam   Eyes: Pupils are equal, round, and reactive to light. Cardiovascular: Normal heart sounds. No murmur heard. Pulmonary/Chest: No respiratory distress. He has no wheezes. He has no rales. Abdominal: Soft. Bowel sounds are normal. He exhibits no distension and no mass. There is no abdominal tenderness. There is no rebound. Blood pressure 104/62, pulse 69, temperature 98.2 °F (36.8 °C), resp. rate 19, height 6' (1.829 m), weight 220 lb (99.8 kg), SpO2 99 %.       .   Lab Results   Component Value Date    WBC 5.6 12/11/2020    HGB 13.4 (L) 12/11/2020    HCT 39.0 (L) 12/11/2020    MCV 90.3 12/11/2020     12/11/2020     Lab Results   Component Value Date     12/11/2020    K 3.7 12/11/2020    K 4.3 11/28/2020     12/11/2020    CO2 19 12/11/2020    BUN 15 12/11/2020    CREATININE 0.58 12/11/2020    GLUCOSE 224 12/11/2020    CALCIUM 8.1 12/11/2020        Microscopic Urinalysis [[id number]] (Abnormal)  Collected: 12/09/20 9632         Updated: 12/10/20 0123        WBC, UA 21-50  /HPF         RBC, UA    /HPF         Epithelial Cells, UA  5-10  /HPF         Bacteria, UA  FEW  /HPF        Urine Reflex to Culture [[id number]] (Abnormal)  Collected: 12/09/20 2345       Specimen: Urine, clean catch  Updated: 12/10/20 0119        Color, UA  RED        Clarity, UA  TURBID        Glucose, Ur  Negative  mg/dL         Bilirubin Urine  SMALL        Ketones, Urine  15  mg/dL         Specific [place], UA  1.025        Blood, Urine  LARGE        pH, UA  5.5        Protein, UA  >=300  mg/dL         Urobilinogen, Urine  1.0  E.U./dL         Nitrite, Urine  POSITIVE        Leukocyte Esterase, Urine  MODERATE        Urine Reflex to Culture  Yes      Culture, Urine [[id number]]  (Abnormal)  Collected: 12/09/20 2345    Order Status: Completed  Specimen: Urine, clean catch  Updated: 12/11/20 1115     Organism  Pseudomonas aeruginosaAbnormal       Urine Culture, Routine  --     >100,000 CFU/ml              PLAN:  Sepsis elevated lactic acid hypotension    Covid 19 infection with hypoxia  On O2 and Decadron  Interim visit    Probable UTI given growing Pseudomonas change to Zosyn

## 2020-12-11 NOTE — PROGRESS NOTES
Physician Progress Note    2020   12:27 PM    Name:  Ana Garcia  MRN:    [id number]     IP Day: 1     Admit Date: 2020 11:12 PM  PCP: JERRICA Uriarte CNP    Code Status:  Full Code      Assessment and Plan: Active Problems/ diagnosis:     COVID-19 pneumonia  UTI  Hypertension  Paroxysmal A. fib with RVR-initially sinus bradycardia-sick sinus syndrome with episode of bradycardia  Acute metabolic encephalopathy   Hypertension  Diabetes  Hyperlipidemia      -Resume remdesivir and Decadron. He is also on Rocephin for UTI  -Cardiology is following-we will likely need pacemaker placement  -Critical care, ID are on board.  -Patient is oriented x1.   -Monitor in ICU until heart rate is stable. DVT PPx     Subjective:     Poor historian. Denies chest pain. Physical Examination:      Vitals:  BP (!) 85/54   Pulse 171   Temp 98.8 °F (37.1 °C) (Oral)   Resp 29   Ht 6' (1.829 m)   Wt 220 lb (99.8 kg)   SpO2 97%   BMI 29.84 kg/m²   Temp (24hrs), Av.9 °F (37.2 °C), Min:98.8 °F (37.1 °C), Max:99 °F (37.2 °C)      General appearance: alert, cooperative and no distress  Mental Status: oriented to person only. Lungs: clear to auscultation bilaterally, normal effort  Heart: Tachycardic.   Abdomen: soft, nontender, nondistended, bowel sounds present, no masses  Extremities: noredness, tenderness in the calves  Skin: no gross lesions, rashes    Data:     Labs:  Recent Labs     12/10/20  0416 12/11/20  0514   WBC 6.4 5.6   HGB 12.0* 13.4*    377     Recent Labs     12/10/20  0416 12/11/20  0514    139   K 4.1 3.7    107   CO2 20 19*   BUN 15 15   CREATININE 0.68* 0.58*   GLUCOSE 200* 224*     Recent Labs     12/10/20  0416 12/11/20  0514   AST 23 24   ALT 19 21   BILITOT <0.2 0.3   ALKPHOS 57 59       Current Facility-Administered Medications   Medication Dose Route Frequency Provider Last Rate Last Dose    metoprolol (LOPRESSOR) injection 5 mg  5 mg Intravenous Q6H PRN Neftaly Trujillo, DO   5 mg at 12/11/20 0907    magnesium oxide (MAG-OX) tablet 400 mg  400 mg Oral BID Neftaly Trujillo, DO   400 mg at 12/11/20 0907    insulin glargine (LANTUS) injection vial 12 Units  12 Units Subcutaneous BID Randall Hardin MD        insulin lispro (HUMALOG) injection vial 0-18 Units  0-18 Units Subcutaneous TID WC Randall Hardin MD        insulin lispro (HUMALOG) injection vial 0-9 Units  0-9 Units Subcutaneous Nightly Randall Hardin MD        dexamethasone (PF) (DECADRON) injection 6 mg  6 mg Intravenous Q24H Neo Hayden MD   6 mg at 12/11/20 0603    cefTRIAXone (ROCEPHIN) 1 g IVPB in 50 mL D5W minibag  1 g Intravenous Q24H Neo Hayden MD   Stopped at 12/11/20 0049    norepinephrine (LEVOPHED) 16 mg in dextrose 5 % 250 mL infusion  2 mcg/min Intravenous Continuous Neo Hayden MD   Stopped at 12/10/20 1719    0.9 % sodium chloride infusion   Intravenous Continuous Neo Hayden  mL/hr at 12/11/20 1018      memantine (NAMENDA) tablet 5 mg  5 mg Oral BID Neo Hayden MD   5 mg at 12/11/20 0907    glucose (GLUTOSE) 40 % oral gel 15 g  15 g Oral PRN Neo Hayden MD        dextrose 50 % IV solution  12.5 g Intravenous PRN Neo Hayden MD        glucagon (rDNA) injection 1 mg  1 mg Intramuscular PRN Neo Hayden MD        dextrose 5 % solution  100 mL/hr Intravenous PRN Neo Hayden MD        sodium chloride flush 0.9 % injection 10 mL  10 mL Intravenous 2 times per day Neo Hayden MD   10 mL at 12/11/20 0908    sodium chloride flush 0.9 % injection 10 mL  10 mL Intravenous PRN Neo Hayden MD        acetaminophen (TYLENOL) tablet 650 mg  650 mg Oral Q6H PRN Neo Hayden MD        Or   Jordan Montane acetaminophen (TYLENOL) suppository 650 mg  650 mg Rectal Q6H PRN Neo Hayden MD        polyethylene glycol (GLYCOLAX) packet 17 g  17 g Oral Daily PRN Neo Hayden MD        promethazine (PHENERGAN) tablet 12.5 mg  12.5 mg Oral Q6H PRN Neo Hayden MD Or    ondansetron (ZOFRAN) injection 4 mg  4 mg Intravenous Q6H PRN Trey Negro MD        DOPamine (INTROPIN) 400 mg in dextrose 5 % 250 mL infusion  2.5 mcg/kg/min Intravenous Continuous Trey Negro MD   Stopped at 12/11/20 0530    magnesium sulfate 2 g in 50 mL IVPB premix  2 g Intravenous Once Trey Negro MD        enoxaparin (LOVENOX) injection 100 mg  1 mg/kg Subcutaneous BID Trey Negro MD   100 mg at 12/11/20 0844    remdesivir 100 mg in sodium chloride 0.9 % 250 mL IVPB  100 mg Intravenous Q24H Roya Benavides MD           Additional work up or/and treatment plan may be added today or then after based on clinical progression. I am managing a portion of pt care. Some medical issues are handled by other specialists. Additional work up and treatment should be done in out pt setting by pt PCP and other out pt providers. In addition to examining and evaluating pt, I spent additional time explaining care, normaland abnormal findings, and treatment plan. All of pt questions were answered. Counseling, diet and education were provided. Case will be discussed with nursing staff when appropriate. Family will be updated if and when appropriate.        Electronically signed by Tomy Cardona DO on 12/11/2020 at 12:27 PM

## 2020-12-11 NOTE — CARE COORDINATION
The patient is from [address] [place] [address] and will need a precert to return.  Electronically signed by BELEN Saez on 12/11/20 at 4:38 PM EST

## 2020-12-11 NOTE — PROGRESS NOTES
2000 Pt has periods of confusion where he thinks he is in [place]- does not know date or month. Pt is SOB with exertion. Nasal O2 at 4L. Has dry cough. Pt follows commands. Incontinent of large amount soft brown stool. Pt has abrased areas on R side of sacrum and buttocks. mepilex applied. Penis and scrotum are red, swollen, excoriated. Protective barrier ointment applied. Pt is in sinus rhythm. Dopamine at 1.5 mcg. Pacer pads in place. Life inder on monitor. 2230 To CT scan per bed with O2 , monitor on.     2330 Pts heart rate dropping in 50s. Dopamine increased to 5 mcg. 0000 placed on BIPAP per resp. Therapy- 12/6, 45% fio2. 0200 Pt awake and alert now. 0400 Cardiology did not see pt on 12/10/2020. Answering service of dr. Sidney Moe renotified of consult for bradycardia. 0530 Pt went into atrial fib with RVR. Heart rate 140 180. Pt remains alert and awake. BP stable. Dopamine turned off.     0545 12 lead EKG done. Dr. Joy dennison served about atrial fib.    6614 Dr. Joy Pritchett ordered a bolus of Normal Saline. 7402 Dr. Ron dennison served regarding atrial fib.     3756 Dr. Ron Garrison ordered Lopressor IV.    0700 Lopressor 5 mg IV.

## 2020-12-11 NOTE — PROGRESS NOTES
Critical Care Medicine  Progress note      Chief complaint: Acute respiratory failure    HISTORY OF PRESENT ILLNESS:    Patient was seen, examined, discussed, during multidisciplinary critical care rounds this morning. He remains improved since yesterday, fully alert awake, confused, but not in any distress. Hemodynamically stable with periods of bradycardia, now more tachycardic. He is in A. fib with RVR at the time I saw him. CT of the chest showed small left dependent area of consolidation suggestive of possible atelectasis but no pneumonic infiltrates to suggest Covid pneumonia. CT of the chest also showed left renal mass which was evaluated recently. Past Medical History:        Diagnosis Date    Adrenal adenoma, right 11/2020    Bipolar 1 disorder ([address]     Cancer of skin of left ear     cured    Hearing loss     History of CVA (cerebrovascular accident)     History of tracheostomy     Hyperlipidemia     Hypertension     Left renal mass 11/2020    MCI (mild cognitive impairment) 2019    Paroxysmal A-fib (HCC)     Uncontrolled diabetes mellitus ([address]        Past Surgical History:        Procedure Laterality Date    AV FISTULA REPAIR      COLONOSCOPY  10/21/15    w/polypectomy     HERNIA REPAIR      TONSILLECTOMY      TRACHEOTOMY         Social History:     reports that he has quit smoking. He has never used smokeless tobacco. He reports that he does not drink alcohol or use drugs.     Family History:       Problem Relation Age of Onset    Diabetes Mother     Heart Disease Mother     Stroke Father     Heart Disease Father     Cancer Brother     Stroke Sister        Allergies:  Pioglitazone        MEDICATIONS during current hospitalization:    Continuous Infusions:   norepinephrine Stopped (12/10/20 1719)    sodium chloride 100 mL/hr at 12/11/20 1018    dextrose      DOPamine Stopped (12/11/20 0530)       Scheduled Meds:   magnesium oxide  400 mg Oral BID    107 107   CO2 18* 20 19*   BUN 15 15 15   CREATININE 0.80 0.68* 0.58*   GLUCOSE 112* 200* 224*       MV Settings:     Vt Ordered: 6 mL    ABGs: No results for input(s): PHART, PXD0STW, PO2ART, NLN3GVN, BEART, S1YDPTIB, JZZ8ESH in the last 72 hours. O2 Device: Nasal cannula  O2 Flow Rate (L/min): 4 L/min  Lab Results   Component Value Date    LACTA 1.2 12/10/2020    LACTA 3.3 12/09/2020    LACTA 1.2 11/23/2020       Radiology  Xr Chest (2 Vw)    Result Date: 11/22/2020  XR CHEST (2 VW) : 11/21/2020 CLINICAL HISTORY:  mental status change . COMPARISON: Portable chest 8/10/2012 and two-view chest 7/19/2005. TECHNIQUE: Upright AP and lateral radiographs of the chest were obtained. FINDINGS: A poor inspiratory volume is present with mild probable bibasilar atelectasis. Bronchopneumonia should be excluded clinically. There is no cardiomegaly, significant pleural effusion, vascular congestion, pneumothorax, or displaced fractures identified. POOR INSPIRATION WITH MILD PROBABLE BIBASILAR ATELECTASIS. BRONCHOPNEUMONIA SHOULD BE EXCLUDED CLINICALLY. Ct Head Wo Contrast    Result Date: 11/22/2020  CT Brain Contrast medium:  Not utilized. History: Altered mental status Comparison:  CT brain, August 10, 2012 MRI brain, August 12, 2012 Findings: Extra-axial spaces:  Normal. Intracranial hemorrhage:  None. Ventricular system: Ventricles mildly enlarged. Sulci mildly prominent. Basal Cisterns:  Normal. Cerebral Parenchyma: Bilateral symmetric periventricular areas decreased attenuation. Midline Shift:  None. Cerebellum:  Normal. Paranasal sinuses and mastoid air cells:  Normal. Visualized Orbits:  Normal.     Impression: No acute findings. Mild cerebral atrophy. Chronic ischemic white matter disease. All CT scans at this facility use dose modulation, iterative reconstruction, and/or weight based dosing when appropriate to reduce radiation dose to as low as reasonably achievable.     Mri Lumbar Spine Wo Contrast    Result Date: 11/23/2020  EXAMINATION: MRI LUMBAR SPINE WO CONTRAST CLINICAL HISTORY:  lumbar stenosis COMPARISONS: Lumbar spine MRI from August 12, 2012 TECHNIQUE: Multiplanar multisequence images of the lumbar spine were obtained without contrast. FINDINGS:  The spine is in anatomic alignment. There is no acute fracture. There is preservation of the vertebral body heights. The intervertebral discs are unremarkable from L1 to L3. There is disassociation moderate disc space narrowing with fissures at L3-4, L4-5, and L5-S1. The presence of annular fissures may be a sensitive pain generator at these levels. . There is a vertebral body hemangioma at L4 there is mild increased marrow signal involving the inferior articular surfaces of L5 which may indicate edema and may also be a specific pain  generator. The distal cord and conus medullaris are within normal limits. The cauda equina is unremarkable. The retroperitoneal structures demonstrate a well-defined 5 cm inhomogeneous mass arising from the inferior pole of the left kidney which is worrisome for a renal cell carcinoma. T12-L1: There is no disc herniation, central canal narrowing, or neural foraminal narrowing. L1-2: There is no disc herniation, central canal narrowing, or neural foraminal narrowing. L2-3: There is no disc herniation, central canal narrowing, or neural foraminal narrowing. There is mild bilateral facet arthrosis. L3-4: There is a 4 mm asymmetric disc bulge as well as moderate bilateral facet arthrosis and moderate ligamentum flavum hypertrophy producing severe narrowing of the central canal. AP diameter of the thecal sac is 6 mm. There is moderate right and mild left neural foraminal narrowing. L4-5: There is a 3 mm symmetric disc bulges all areas mild bilateral facet arthrosis and mild ligamentum flavum hypertrophy with mild narrowing of the central canal and mild bilateral neural foraminal narrowing.   L5-S1: There is a 5 mm symmetric disc bulge as well as its mild to moderate left facet arthrosis without ligamentum flavum hypertrophy. There is mild narrowing of central canal. There is mild right and moderate left neural foraminal narrowing. 1. There is no acute fracture or subluxation. The distal cord, conus medullaris, and cauda equina are within normal limits. 2. There is a 5 cm well-circumscribed inhomogeneous mass arising from the inferior pole of the left kidney which is worrisome for renal cell carcinoma. Dedicated renal imaging with CT urogram or renal MRI should be considered. 3. There is multilevel spondylosis from L3 to S1 with disc desiccation, disc space narrowing, and annular fissures which may be specifically generators at these levels. There is mild edema involving the articular surfaces at L5-S1 which may also be a specific pain generator. 4. At L3-4 there is severe central canal narrowing with AP diameter of thecal sac of 6 mm and a multifactorial basis. There is moderate right and mild left neural foraminal narrowing. The remaining levels do not demonstrate significant central canal narrowing or foraminal narrowing. 5. At L3-4 there is moderate bilateral facet arthrosis. This may correlate with facet syndrome, back pain radiating inferiorly to the buttocks and posterior thighs. In the appropriate clinical setting these may be potential targets for image guided therapeutic injections. Findings were called to 2 W. and the finding of left renal tumor was briefly discussed with Irene Teixeira RN. Xr Chest Portable    Result Date: 12/9/2020  EXAMINATION: XR CHEST PORTABLE CLINICAL HISTORY: HYPERVENTILATING COMPARISONS: None available. FINDINGS: Osseous structures intact. Cardiopericardial silhouette upper limits of normal. Lungs clear. Borderline cardiomegaly.     Ct Urogram    Result Date: 11/23/2020  EXAM:  CT UROGRAM History: Renal mass Technique: Multiple contiguous axial images were obtained of the abdomen and pelvis from an level of effusion. Small left pleural effusion. All CT scans at this facility use dose modulation, iterative reconstruction, and/or weight based dosing when appropriate to reduce radiation dose to as low as reasonably achievable. Mri Brain Wo Contrast    Result Date: 11/25/2020  EXAMINATION: MRI BRAIN WO CONTRAST CLINICAL HISTORY:  encephalopathy, new onset afib COMPARISONS: Brain MRI from August 12, 2012 TECHNIQUE: Multiplanar multisequence images of the brain were obtained without contrast. Diffusion perfusion imaging was obtained. FINDINGS: This study is partly limited due to motion artifact. There are no extra-axial collections. There is no evidence of hemorrhage. There are no areas of signal abnormality on perfusion diffusion imaging to suggest ischemia. The susceptibility images do not demonstrate evidence of hemosiderin deposition within  the brain parenchyma or the leptomeninges. There is preservation of the gray-white matter differentiation. There are no areas of signal abnormality within the brain parenchyma or the posterior fossa to suggest lesion. There is prominence of the sulci and ventricles consistent with moderate global  cerebral atrophy and chronic involutional changes. The midline structures are intact, the corpus callosum is within normal limits. The region of the pineal gland and the sella turcica are unremarkable. There are no space-occupying lesions in the posterior fossa. The basilar cisterns are patent. The craniocervical junction is unremarkable. The visualized portions of the orbits are within normal limits, the globes are intact. The visualized portions of the paranasal sinuses are within normal limits. The calvarium and soft tissues are unremarkable. There are no acute intracranial changes, no evidence of ischemia or hemorrhage. There are no regions of signal abnormality.      Us Retroperitoneal Limited    Result Date: 11/24/2020  EXAMINATION: US RETROPERITONEAL LIMITED DATE AND TIME:11/23/2020 4:44 PM CLINICAL HISTORY: HYDRONEPHROSIS. LEFT RENAL MASS  COMPARISONS:  NONE FINDINGS: Right kidney 11.2 cm. Left kidney. 14.6 cm. Echogenicity The echogenicity of renal cortex is less than the adjacent liver. Hydronephrosis There is no hydronephrosis. Mass: 6.2 x 5.3 x 5.6 cm solid mass projecting off the lower pole of the left kidney with associated calcification. Appearance worrisome for renal cell carcinoma. Small simple cysts bilaterally. 6.2 CM SOLID MASS LOWER POLE LEFT KIDNEY. RENAL CELL CARCINOMA OF PRIMARY CONCERN. Us Carotid Artery Bilateral    Result Date: 11/23/2020  US CAROTID ARTERY BILATERAL: 11/22/2020 CLINICAL HISTORY:  stenosis . History of atherosclerotic disease. COMPARISON: 3/28/2008. Grayscale, color and waveform Doppler analysis of the cervical carotid and vertebral arteries was performed. Validated velocity measurements with angiographic measurements, velocity criteria are extrapolated from diameter data as defined by the Society of Radiologist in [address] [place] [address] Drive Radiology 2003; [id number]. FINDINGS: There is mild to moderate irregular atherosclerotic plaquing of the carotid bulbs without significantly elevated velocities, spectral waveform broadening, or incidental findings of concern identified. There is antegrade flow in both vertebral arteries. ARTERIAL BLOOD FLOW VELOCITY RIGHT Peak Systolic                                              Prox CCA:  116 cm/s             Mid CCA:  131 cm/s              Dist CCA:  1 cm/s              Prox ICA:  102 cm/s               Mid ICA:  80 cm/s            Dist ICA:  70 cm/s             Prox ECA:  173 cm/s             Prox VERT:  66 cm/s              ICA/CCA    0.70, which indicates less than 50% narrowing by velocity criteria.  LEFT Peak Systolic Prox CCA:  743 cm/s             Mid CCA:  117 cm/s              Dist CCA:  123 cm/s              Prox ICA:  107 cm/s               Mid ICA:  86 cm/s            Dist ICA:  87 cm/s             Prox ECA:  83 cm/s             Prox VERT:  47 cm/s                 ICA/CCA     0.91, which indicates less than 50% narrowing by velocity criteria. MILD TO MODERATE ATHEROSCLEROTIC PLAQUING OF THE CAROTID BULBS WITHOUT EVIDENCE OF A FLOW-LIMITING STENOSIS. Assessment, plan:   1. Acute hypoxic respiratory failure, etiology is probably a combination of sepsis with lung injury, recent COVID-19 viral infection but no clear evidence of viral pneumonia, congestive heart failure, and obstructive sleep apnea, improved  2. Urinary tract infection, culture positive for Pseudomonas aeruginosa, awaiting ID recommendations regarding antipseudomonal coverage patient is only on Rocephin  3. Shock associated with sepsis, remains on low-dose vasopressor support but improving  4. Tacky/bradycardia syndrome will likely need pacemaker placement in addition to medical control of ventricular rate  5. Obstructive sleep apnea patient had witnessed apneas initially, seem to improve neurologically with BiPAP use, will need evaluation and management long-term   6.  History of CVA, dementia          Electronically signed by Tanika Haider MD, [facility] on 12/11/2020 at 11:41 AM

## 2020-12-11 NOTE — PROGRESS NOTES
Interdisciplinary ICU Rounds:   Verbal Orders / Changes made by Pharmacy    Increased Lantus to 12 units BID  Changed insulin lispro from MEDIUM Dose Corrective Algorithm to HIGH Dose Corrective Algorithm    Thank you,    Kya Duenas, PharmD.   10:43 AM 12/11/2020

## 2020-12-12 LAB
ALBUMIN SERPL-MCNC: 2.7 G/DL (ref 3.5–4.6)
ALP BLD-CCNC: 54 U/L (ref 35–104)
ALT SERPL-CCNC: 24 U/L (ref 0–41)
ANION GAP SERPL CALCULATED.3IONS-SCNC: 12 MEQ/L (ref 9–15)
AST SERPL-CCNC: 33 U/L (ref 0–40)
BASE EXCESS ARTERIAL: -5 (ref -3–3)
BASOPHILS ABSOLUTE: 0 K/UL (ref 0–0.2)
BASOPHILS RELATIVE PERCENT: 0 %
BILIRUB SERPL-MCNC: 0.4 MG/DL (ref 0.2–0.7)
BUN BLDV-MCNC: 16 MG/DL (ref 8–23)
CALCIUM IONIZED: 1.15 MMOL/L (ref 1.12–1.32)
CALCIUM SERPL-MCNC: 7.7 MG/DL (ref 8.5–9.9)
CHLORIDE BLD-SCNC: 101 MEQ/L (ref 95–107)
CO2: 19 MEQ/L (ref 20–31)
CREAT SERPL-MCNC: 0.61 MG/DL (ref 0.7–1.2)
EOSINOPHILS ABSOLUTE: 0 K/UL (ref 0–0.7)
EOSINOPHILS RELATIVE PERCENT: 0 %
GFR AFRICAN AMERICAN: >60
GFR AFRICAN AMERICAN: >60
GFR NON-AFRICAN AMERICAN: >60
GFR NON-AFRICAN AMERICAN: >60
GLOBULIN: 3.3 G/DL (ref 2.3–3.5)
GLUCOSE BLD-MCNC: 170 MG/DL (ref 60–115)
GLUCOSE BLD-MCNC: 221 MG/DL (ref 70–99)
GLUCOSE BLD-MCNC: 226 MG/DL (ref 60–115)
GLUCOSE BLD-MCNC: 257 MG/DL (ref 60–115)
GLUCOSE BLD-MCNC: 310 MG/DL (ref 60–115)
GLUCOSE BLD-MCNC: 322 MG/DL (ref 60–115)
HCO3 ARTERIAL: 21.8 MMOL/L (ref 21–29)
HCT VFR BLD CALC: 37.4 % (ref 42–52)
HEMOGLOBIN: 12.4 G/DL (ref 14–18)
HEMOGLOBIN: 13.2 GM/DL (ref 13.5–17.5)
LACTATE: 0.88 MMOL/L (ref 0.4–2)
LYMPHOCYTES ABSOLUTE: 0.3 K/UL (ref 1–4.8)
LYMPHOCYTES RELATIVE PERCENT: 3 %
MAGNESIUM: 1.8 MG/DL (ref 1.7–2.4)
MCH RBC QN AUTO: 30.2 PG (ref 27–31.3)
MCHC RBC AUTO-ENTMCNC: 33.2 % (ref 33–37)
MCV RBC AUTO: 90.9 FL (ref 80–100)
MONOCYTES ABSOLUTE: 0.4 K/UL (ref 0.2–0.8)
MONOCYTES RELATIVE PERCENT: 3.6 %
NEUTROPHILS ABSOLUTE: 9.7 K/UL (ref 1.4–6.5)
NEUTROPHILS RELATIVE PERCENT: 93.4 %
O2 SAT, ARTERIAL: 97 % (ref 93–100)
ORGANISM: ABNORMAL
ORGANISM: ABNORMAL
PCO2 ARTERIAL: 46 MM HG (ref 35–45)
PDW BLD-RTO: 13.3 % (ref 11.5–14.5)
PERFORMED ON: ABNORMAL
PH ARTERIAL: 7.28 (ref 7.35–7.45)
PLATELET # BLD: 336 K/UL (ref 130–400)
PO2 ARTERIAL: 107 MM HG (ref 75–108)
POC CHLORIDE: 104 MEQ/L (ref 99–110)
POC CREATININE: 0.7 MG/DL (ref 0.8–1.3)
POC FIO2: 5
POC HEMATOCRIT: 39 % (ref 41–53)
POC POTASSIUM: 4.6 MEQ/L (ref 3.5–5.1)
POC SAMPLE TYPE: ABNORMAL
POC SODIUM: 137 MEQ/L (ref 136–145)
POTASSIUM SERPL-SCNC: 3.9 MEQ/L (ref 3.4–4.9)
RBC # BLD: 4.12 M/UL (ref 4.7–6.1)
SODIUM BLD-SCNC: 132 MEQ/L (ref 135–144)
TCO2 ARTERIAL: 23 (ref 22–29)
TOTAL PROTEIN: 6 G/DL (ref 6.3–8)
URINE CULTURE, ROUTINE: ABNORMAL
URINE CULTURE, ROUTINE: ABNORMAL
WBC # BLD: 10.4 K/UL (ref 4.8–10.8)

## 2020-12-12 PROCEDURE — 83735 ASSAY OF MAGNESIUM: CPT

## 2020-12-12 PROCEDURE — 80053 COMPREHEN METABOLIC PANEL: CPT

## 2020-12-12 PROCEDURE — 99233 SBSQ HOSP IP/OBS HIGH 50: CPT | Performed by: INTERNAL MEDICINE

## 2020-12-12 PROCEDURE — 94761 N-INVAS EAR/PLS OXIMETRY MLT: CPT

## 2020-12-12 PROCEDURE — 6360000002 HC RX W HCPCS: Performed by: INTERNAL MEDICINE

## 2020-12-12 PROCEDURE — 2700000000 HC OXYGEN THERAPY PER DAY

## 2020-12-12 PROCEDURE — 94660 CPAP INITIATION&MGMT: CPT

## 2020-12-12 PROCEDURE — 2060000000 HC ICU INTERMEDIATE R&B

## 2020-12-12 PROCEDURE — 2580000003 HC RX 258: Performed by: INTERNAL MEDICINE

## 2020-12-12 PROCEDURE — 6370000000 HC RX 637 (ALT 250 FOR IP): Performed by: INTERNAL MEDICINE

## 2020-12-12 PROCEDURE — 99232 SBSQ HOSP IP/OBS MODERATE 35: CPT | Performed by: INTERNAL MEDICINE

## 2020-12-12 PROCEDURE — 36415 COLL VENOUS BLD VENIPUNCTURE: CPT

## 2020-12-12 PROCEDURE — 2500000003 HC RX 250 WO HCPCS: Performed by: INTERNAL MEDICINE

## 2020-12-12 PROCEDURE — 85025 COMPLETE CBC W/AUTO DIFF WBC: CPT

## 2020-12-12 RX ADMIN — ENOXAPARIN SODIUM 100 MG: 100 INJECTION SUBCUTANEOUS at 20:41

## 2020-12-12 RX ADMIN — PIPERACILLIN AND TAZOBACTAM 3.38 G: 3; .375 INJECTION, POWDER, LYOPHILIZED, FOR SOLUTION INTRAVENOUS at 06:12

## 2020-12-12 RX ADMIN — INSULIN GLARGINE 12 UNITS: 100 INJECTION, SOLUTION SUBCUTANEOUS at 20:42

## 2020-12-12 RX ADMIN — DEXAMETHASONE SODIUM PHOSPHATE 6 MG: 10 INJECTION INTRAMUSCULAR; INTRAVENOUS at 06:12

## 2020-12-12 RX ADMIN — Medication 400 MG: at 08:35

## 2020-12-12 RX ADMIN — SODIUM CHLORIDE: 9 INJECTION, SOLUTION INTRAVENOUS at 02:00

## 2020-12-12 RX ADMIN — REMDESIVIR 100 MG: 5 INJECTION INTRAVENOUS at 17:29

## 2020-12-12 RX ADMIN — SODIUM CHLORIDE: 9 INJECTION, SOLUTION INTRAVENOUS at 08:46

## 2020-12-12 RX ADMIN — ENOXAPARIN SODIUM 100 MG: 100 INJECTION SUBCUTANEOUS at 08:35

## 2020-12-12 RX ADMIN — Medication 10 ML: at 08:40

## 2020-12-12 RX ADMIN — Medication 400 MG: at 20:42

## 2020-12-12 RX ADMIN — MEMANTINE HYDROCHLORIDE 5 MG: 5 TABLET ORAL at 08:35

## 2020-12-12 RX ADMIN — INSULIN GLARGINE 12 UNITS: 100 INJECTION, SOLUTION SUBCUTANEOUS at 08:56

## 2020-12-12 RX ADMIN — PIPERACILLIN AND TAZOBACTAM 3.38 G: 3; .375 INJECTION, POWDER, LYOPHILIZED, FOR SOLUTION INTRAVENOUS at 22:11

## 2020-12-12 RX ADMIN — PIPERACILLIN AND TAZOBACTAM 3.38 G: 3; .375 INJECTION, POWDER, LYOPHILIZED, FOR SOLUTION INTRAVENOUS at 14:14

## 2020-12-12 RX ADMIN — Medication 10 ML: at 20:46

## 2020-12-12 RX ADMIN — MEMANTINE HYDROCHLORIDE 5 MG: 5 TABLET ORAL at 20:42

## 2020-12-12 ASSESSMENT — PAIN SCALES - GENERAL
PAINLEVEL_OUTOF10: 0

## 2020-12-12 ASSESSMENT — ENCOUNTER SYMPTOMS
GASTROINTESTINAL NEGATIVE: 1
SHORTNESS OF BREATH: 1
COUGH: 0

## 2020-12-12 NOTE — FLOWSHEET NOTE
Spoke with patients daughter via phone. Updated on condition and plan of care. All questions answered.  Electronically signed by Gailen Cockayne, RN on 12/12/2020 at 4:14 PM

## 2020-12-12 NOTE — PLAN OF CARE
Problem: Airway Clearance - Ineffective  Goal: Achieve or maintain patent airway  Outcome: Ongoing     Problem: Gas Exchange - Impaired  Goal: Absence of hypoxia  Outcome: Ongoing  Goal: Promote optimal lung function  Outcome: Ongoing     Problem: Breathing Pattern - Ineffective  Goal: Ability to achieve and maintain a regular respiratory rate  Outcome: Ongoing     Problem:  Body Temperature -  Risk of, Imbalanced  Goal: Ability to maintain a body temperature within defined limits  Outcome: Ongoing  Goal: Complications related to the disease process, condition or treatment will be avoided or minimized  Outcome: Ongoing     Problem: Isolation Precautions - Risk of Spread of Infection  Goal: Prevent transmission of infection  Outcome: Ongoing     Problem: Nutrition Deficits  Goal: Optimize nutrtional status  Outcome: Ongoing     Problem: Skin Integrity:  Goal: Will show no infection signs and symptoms  Description: Will show no infection signs and symptoms  Outcome: Ongoing  Goal: Absence of new skin breakdown  Description: Absence of new skin breakdown  Outcome: Ongoing     Problem: Falls - Risk of:  Goal: Will remain free from falls  Description: Will remain free from falls  Outcome: Ongoing  Goal: Absence of physical injury  Description: Absence of physical injury  Outcome: Ongoing     Problem: Respiratory:  Goal: Respiratory status will improve  Description: Respiratory status will improve  Outcome: Ongoing
symptoms  Outcome: Ongoing  Goal: Absence of new skin breakdown  Description: Absence of new skin breakdown  Outcome: Ongoing     Problem: Falls - Risk of:  Goal: Will remain free from falls  Description: Will remain free from falls  Outcome: Ongoing  Goal: Absence of physical injury  Description: Absence of physical injury  Outcome: Ongoing     Problem: Cardiac:  Goal: Ability to maintain an adequate cardiac output will improve  Description: Ability to maintain an adequate cardiac output will improve  Outcome: Ongoing  Goal: Hemodynamic stability will improve  Description: Hemodynamic stability will improve  Outcome: Ongoing     Problem: Fluid Volume:  Goal: Ability to achieve and maintain adequate urine output will improve  Description: Ability to achieve and maintain adequate urine output will improve  Outcome: Ongoing     Problem: Respiratory:  Goal: Respiratory status will improve  Description: Respiratory status will improve  Outcome: Ongoing

## 2020-12-12 NOTE — PROGRESS NOTES
Assessment completed as documented. Pt is awake and alert. Confused, calm and cooperative. Resting quietly without distress. Denies pain or shortness of breath. HR Sinus rhythm in 60s. Vital signs are stable on 4L nasal cannula. IV L FA infiltrated, removed, catheter intact. #20 started R lower FA with good blood return.

## 2020-12-12 NOTE — PROGRESS NOTES
Infectious Disease     Patient Name: Tanesha Floyd  Date: 12/12/2020  YOB: 1950  Medical Record Number: [id number]      Sepsis     Covid 19 infection with hypoxia    Probable UTI        Comes from nursing home diagnosed with COVID-19 becoming progressively more dyspneic  Repeat COVID-19 testing positive received fluid resuscitation hypertension and norepinephrine  Also developing bradycardia  Maintaining saturations with BiPAP         Chest x-ray shows cardiomegaly with clear lung fields    Currently on ceftriaxone and Decadron          Review of Systems   Constitutional: Negative for chills, diaphoresis and fatigue. Patient is poor historian confused   Respiratory: Positive for shortness of breath. Negative for cough. Gastrointestinal: Negative. Physical Exam   Eyes: Pupils are equal, round, and reactive to light. Cardiovascular: Normal heart sounds. No murmur heard. Pulmonary/Chest: No respiratory distress. He has no wheezes. He has no rales. Abdominal: Soft. Bowel sounds are normal. He exhibits no distension and no mass. There is no abdominal tenderness. There is no rebound. Blood pressure (!) 132/52, pulse (!) 47, temperature 97.9 °F (36.6 °C), temperature source Axillary, resp. rate 15, height 6' (1.829 m), weight 220 lb (99.8 kg), SpO2 99 %.       .   Lab Results   Component Value Date    WBC 10.4 12/12/2020    HGB 12.4 (L) 12/12/2020    HCT 37.4 (L) 12/12/2020    MCV 90.9 12/12/2020     12/12/2020     Lab Results   Component Value Date     12/12/2020    K 3.9 12/12/2020    K 4.3 11/28/2020     12/12/2020    CO2 19 12/12/2020    BUN 16 12/12/2020    CREATININE 0.61 12/12/2020    GLUCOSE 221 12/12/2020    CALCIUM 7.7 12/12/2020        Microscopic Urinalysis [[id number]] (Abnormal)  Collected: 12/09/20 2345         Updated: 12/10/20 0123        WBC, UA  21-50  /HPF         RBC, UA    /HPF         Epithelial Cells, UA  5-10  /HPF

## 2020-12-12 NOTE — PROGRESS NOTES
Pt. Ismael Edwards on bipap 12/6 with 45% Fio2, for HS by RT. Pt. Tolerating well. Resting without distress. Assisted with repositioning and partial linen change.

## 2020-12-12 NOTE — PROGRESS NOTES
Physician Progress Note    2020   12:14 PM    Name:  Malou Card  MRN:    [id number]     IP Day: 2     Admit Date: 2020 11:12 PM  PCP: JERRICA Parry CNP    Code Status:  Full Code      Assessment and Plan: Active Problems/ diagnosis:     COVID-19 pneumonia  UTI  Hypertension  Paroxysmal A. fib with RVR-initially sinus bradycardia-sick sinus syndrome with episode of bradycardia  Acute metabolic encephalopathy   Hypertension  Diabetes  Hyperlipidemia      -Resume remdesivir and Decadron. He is also on Rocephin for UTI  -Cardiology is following-we will likely need pacemaker placement  -Critical care, ID are on board.  -Patient is oriented x1.   -Monitor in ICU until heart rate is stable.   -Resume remdesivir and Decadron. Switch to Zosyn for possible pseudomonal UTI  -Resume telemetry monitoring, possible pacemaker placement as per cardiology off of dopamine  -Cardiology, critical care and ID on board. DVT PPx     Subjective:     Poor historian. Denies chest pain. Physical Examination:      Vitals:  BP (!) 140/70   Pulse 58   Temp 97.9 °F (36.6 °C) (Axillary)   Resp 18   Ht 6' (1.829 m)   Wt 220 lb (99.8 kg)   SpO2 100%   BMI 29.84 kg/m²   Temp (24hrs), Av °F (36.7 °C), Min:97.8 °F (36.6 °C), Max:98.2 °F (36.8 °C)      General appearance: alert, cooperative and no distress  Mental Status: oriented to person only. Lungs: clear to auscultation bilaterally, normal effort  Heart: Bradycardic today. .  Abdomen: soft, nontender, nondistended, bowel sounds present, no masses  Extremities: noredness, tenderness in the calves  Skin: no gross lesions, rashes    Data:     Labs:  Recent Labs     20  1011   WBC 5.6 10.4   HGB 13.4* 12.4*    336     Recent Labs     20  1011    132*   K 3.7 3.9    101   CO2 19* 19*   BUN 15 16   CREATININE 0.58* 0.61*   GLUCOSE 224* 221*     Recent Labs     20 12/12/20  1011   AST 24 33   ALT 21 24   BILITOT 0.3 0.4   ALKPHOS 59 54       Current Facility-Administered Medications   Medication Dose Route Frequency Provider Last Rate Last Admin    metoprolol (LOPRESSOR) injection 5 mg  5 mg Intravenous Q6H PRN Wes J Holiday, DO   5 mg at 12/11/20 0907    magnesium oxide (MAG-OX) tablet 400 mg  400 mg Oral BID Wes J Holiday, DO   400 mg at 12/12/20 0835    insulin glargine (LANTUS) injection vial 12 Units  12 Units Subcutaneous BID Carmel Davis MD   12 Units at 12/12/20 0856    insulin lispro (HUMALOG) injection vial 0-18 Units  0-18 Units Subcutaneous TID WC Carmel Davis MD   3 Units at 12/12/20 0856    insulin lispro (HUMALOG) injection vial 0-9 Units  0-9 Units Subcutaneous Nightly Carmel Davis MD   2 Units at 12/11/20 2031    piperacillin-tazobactam (ZOSYN) 3.375 g in dextrose 5 % 50 mL IVPB extended infusion (mini-bag)  3.375 g Intravenous Q8H Teto Robertson MD   Stopped at 12/12/20 1015    dexamethasone (PF) (DECADRON) injection 6 mg  6 mg Intravenous Q24H Fortunato Jolly MD   6 mg at 12/12/20 0612    memantine (NAMENDA) tablet 5 mg  5 mg Oral BID Belkis Monique MD   5 mg at 12/12/20 0835    glucose (GLUTOSE) 40 % oral gel 15 g  15 g Oral PRN Belkis Monique MD        dextrose 50 % IV solution  12.5 g Intravenous PRN Belkis Monique MD        glucagon (rDNA) injection 1 mg  1 mg Intramuscular PRN Belkis Monique MD        dextrose 5 % solution  100 mL/hr Intravenous PRN Belkis Monique MD        sodium chloride flush 0.9 % injection 10 mL  10 mL Intravenous 2 times per day Belkis Monique MD   10 mL at 12/12/20 0840    sodium chloride flush 0.9 % injection 10 mL  10 mL Intravenous PRN Belkis Monique MD        acetaminophen (TYLENOL) tablet 650 mg  650 mg Oral Q6H PRN Belkis Monique MD        Or   Darlene Casper acetaminophen (TYLENOL) suppository 650 mg  650 mg Rectal Q6H PRN Belkis Monique MD        polyethylene glycol (GLYCOLAX) packet 17 g  17 g Oral Daily PRN Rodger Ormond, MD        promethazine [facility]) tablet 12.5 mg  12.5 mg Oral Q6H PRN Rodger Ormond, MD        Or    ondansetron [facility]) injection 4 mg  4 mg Intravenous Q6H PRN Rodger Ormond, MD        magnesium sulfate 2 g in 50 mL IVPB premix  2 g Intravenous Once Rodger Ormond, MD        enoxaparin (LOVENOX) injection 100 mg  1 mg/kg Subcutaneous BID Rodger Ormond, MD   100 mg at 12/12/20 0835    remdesivir 100 mg in sodium chloride 0.9 % 250 mL IVPB  100 mg Intravenous Q24H Yadiel Diaz MD   Stopped at 12/11/20 1707       Additional work up or/and treatment plan may be added today or then after based on clinical progression. I am managing a portion of pt care. Some medical issues are handled by other specialists. Additional work up and treatment should be done in out pt setting by pt PCP and other out pt providers. In addition to examining and evaluating pt, I spent additional time explaining care, normaland abnormal findings, and treatment plan. All of pt questions were answered. Counseling, diet and education were provided. Case will be discussed with nursing staff when appropriate. Family will be updated if and when appropriate.        Electronically signed by Margarette Read DO on 12/12/2020 at 12:14 PM

## 2020-12-12 NOTE — PROGRESS NOTES
MEDICATIONS    No current facility-administered medications on file prior to encounter. Current Outpatient Medications on File Prior to Encounter   Medication Sig Dispense Refill    sulfamethoxazole-trimethoprim (BACTRIM DS;SEPTRA DS) 800-160 MG per tablet Take 1 tablet by mouth 2 times daily for 7 days 14 tablet 0    tamsulosin (FLOMAX) 0.4 MG capsule Take 1 capsule by mouth daily 10 capsule 0    metFORMIN (GLUCOPHAGE) 1000 MG tablet Take 1 tablet by mouth 2 times daily (with meals) 60 tablet 0    insulin glargine (LANTUS) 100 UNIT/ML injection vial Inject 6 Units into the skin 2 times daily 1 vial 3    rivaroxaban (XARELTO) 20 MG TABS tablet Take 1 tablet by mouth daily 30 tablet 0    metoprolol tartrate (LOPRESSOR) 50 MG tablet Take 1 tablet by mouth every 8 hours 60 tablet 3    digoxin (LANOXIN) 125 MCG tablet Take 1 tablet by mouth daily 30 tablet 3    tamsulosin (FLOMAX) 0.4 MG capsule Take 1 capsule by mouth daily 30 capsule 3    gabapentin (NEURONTIN) 300 MG capsule Take 300 mg by mouth 2 times daily.       memantine (NAMENDA) 5 MG tablet Take 5 mg by mouth 2 times daily      alogliptin (NESINA) 25 MG TABS tablet Take 25 mg by mouth daily      SIMVASTATIN PO Take by mouth      aspirin 81 MG tablet Take 81 mg by mouth daily         Objective    BP (!) 105/54   Pulse 63   Temp 98.2 °F (36.8 °C)   Resp 23   Ht 6' (1.829 m)   Wt 220 lb (99.8 kg)   SpO2 99%   BMI 29.84 kg/m²     LABS:  WBC:    Lab Results   Component Value Date    WBC 5.6 12/11/2020     H/H:    Lab Results   Component Value Date    HGB 13.4 12/11/2020    HCT 39.0 12/11/2020     PTT:    Lab Results   Component Value Date    PTT 27.3 08/10/2012   [APTT}  PT/INR:    Lab Results   Component Value Date    PROTIME 11.2 08/10/2012    INR 1.1 08/10/2012     HgBA1c:    Lab Results   Component Value Date    LABA1C 10.2 12/08/2020       Assessment   Mayco Risk Score: Mayco Scale Score: 13    Patient Active Problem List Diagnosis    [facility] disorder [facility]    Diabetes ([facility]    Pneumonia    Dementia ([facility]    Renal mass, left    Urinary retention    Spinal stenosis of lumbar region with neurogenic claudication    Weakness    Paroxysmal atrial fibrillation (HCC)    Acquired cavus deformity of foot    Adhesive capsulitis of shoulder    Diabetic peripheral neuropathy (HCC)    Mild cognitive disorder    Pneumonia due to COVID-19 virus       Measurements:          Response to treatment:  With complaints of pain. Assessment: Pt admitted to ICU for pneumonia due to COVID-19. Pt incontinent of stool, unable to sense when he needs to have a BM. His sacrum to scrotum is red and blanchable. Some open areas of denuded skin noted to intergluteal cleft. Pt is turned frequently and cleaned when soiled. Moisture barrier cream applied to affected skin. Mepilex applied to sacrum.       Plan   Plan of Care:      Specialty Bed Required : Yes   [x] Low Air Loss   [] Pressure Redistribution  [] Fluid Immersion  [] Bariatric  [] Other:     Current Diet: DIET CARB CONTROL; Safety Tray; Safety Tray (Disposables)  Dietary Nutrition Supplements: Diabetic Oral Supplement  Dietician consult:  Yes    Discharge Plan:  Placement for patient upon discharge: skilled nursing    Patient appropriate for Outpatient [address]: No    Referrals:  []   [] [address] [facility]  [] Supplies  [] Other    Patient/Caregiver Teaching:  Level of patient/caregiver understanding able to:   [x] Indicates understanding       [] Needs reinforcement  [] Unsuccessful      [x] Verbal Understanding  [] Demonstrated understanding       [] No evidence of learning  [] Refused teaching         [] N/A       Electronically signed by Raffi Barrera RN on 12/11/2020 at 7:37 PM

## 2020-12-12 NOTE — PROGRESS NOTES
Progress Note  Patient: Stephany Aldana  Unit/Bed: IC05/IC05-01  YOB: 1950  MRN: [id number]  Acct: [de-identified]   Admitting Diagnosis: Pneumonia due to COVID-19 virus [U07.1, J12.89]  Date:  12/9/2020  Hospital Day: 2    Chief Complaint:  Hartness of breath atrial arrhythmias bradycardia    Subjective  Extensive past medical history with multiple comorbidities. Dementia bipolar disorder paroxysmal atrial fibrillation possibly sick sinus syndrome.     Review of Systems:   Review of Systems   Unable to perform ROS: Acuity of condition         Physical Examination:    /65   Pulse 56   Temp 97.9 °F (36.6 °C) (Axillary)   Resp 20   Ht 6' (1.829 m)   Wt 220 lb (99.8 kg)   SpO2 99%   BMI 29.84 kg/m²    Physical Exam    LABS:  CBC:   Lab Results   Component Value Date    WBC 10.4 12/12/2020    RBC 4.12 12/12/2020    HGB 12.4 12/12/2020    HCT 37.4 12/12/2020    MCV 90.9 12/12/2020    MCH 30.2 12/12/2020    MCHC 33.2 12/12/2020    RDW 13.3 12/12/2020     12/12/2020    MPV 7.3 08/12/2012     CBC with Differential:   Lab Results   Component Value Date    WBC 10.4 12/12/2020    RBC 4.12 12/12/2020    HGB 12.4 12/12/2020    HCT 37.4 12/12/2020     12/12/2020    MCV 90.9 12/12/2020    MCH 30.2 12/12/2020    MCHC 33.2 12/12/2020    RDW 13.3 12/12/2020    LYMPHOPCT 3.0 12/12/2020    MONOPCT 3.6 12/12/2020    BASOPCT 0.0 12/12/2020    MONOSABS 0.4 12/12/2020    LYMPHSABS 0.3 12/12/2020    EOSABS 0.0 12/12/2020    BASOSABS 0.0 12/12/2020     CMP:    Lab Results   Component Value Date     12/12/2020    K 3.9 12/12/2020    K 4.3 11/28/2020     12/12/2020    CO2 19 12/12/2020    BUN 16 12/12/2020    CREATININE 0.61 12/12/2020    GFRAA >60.0 12/12/2020    LABGLOM >60.0 12/12/2020    GLUCOSE 221 12/12/2020    PROT 6.0 12/12/2020    LABALBU 2.7 12/12/2020    CALCIUM 7.7 12/12/2020    BILITOT 0.4 12/12/2020    ALKPHOS 54 12/12/2020    AST 33 12/12/2020    ALT 24 12/12/2020     BMP:    Lab EMBOLISM. 2.  THERE ARE BIBASILAR AREAS ATELECTASIS, AND OPACITIES WITHIN BOTH BASES LEFT GREATER THAN RIGHT AS DESCRIBED ABOVE. WHILE THESE MAY REPRESENT DEVELOPING FOCAL CONSOLIDATIONS UNDERLYING SOFT TISSUE MASS MALIGNANCY IS NOT EXCLUDED ESPECIALLY GIVEN THE FINDINGS WITHIN THE LEFT KIDNEY AS DESCRIBED BELOW. WILL NEED CONTINUED FOLLOW-UP. 3. PARTIALLY VISUALIZED COMPLEX MASS LEFT KIDNEY. SOME OF THE APPEARANCE OF THE CT UROGRAM OF NOVEMBER 23, 2020. FINDINGS SHOULD REMAIN SUSPICIOUS FOR THAT OF MALIGNANCY UNTIL PROVEN OTHERWISE. All CT scans at this facility use dose modulation, iterative reconstruction, and/or weight based dosing when appropriate to reduce radiation dose to as low as reasonably achievable. Xr Chest Portable    Result Date: 12/11/2020  EXAMINATION: XR CHEST PORTABLE CLINICAL HISTORY: HYPOXIA COMPARISONS: CT CHEST, DECEMBER 10, 2020 FINDINGS: Osseous structures are intact. Cardiopericardial silhouette is normal. Pulmonary vasculature is normal. Lungs are clear. NO ACUTE CARDIOPULMONARY DISEASE.       EKG: Assessment:    Active Hospital Problems    Diagnosis Date Noted    Pneumonia due to COVID-19 virus [U07.1, J12.89] 12/10/2020           Plan:  1. Continue to monitor.   2. ?Need for PM.  Normal (KJ fraction 60%  Electronically signed by Dena Christopher MD on 12/12/2020 at 2:03 PM

## 2020-12-12 NOTE — CARE COORDINATION
Rounds done with Dr. Paola Herr outside of patients room and pt may get to tx out of ICU today. He still may need PM and continue to monitor with daily rounds.

## 2020-12-12 NOTE — PROGRESS NOTES
Pulmonary & Critical Care Medicine ICU Progress Note  Chief complaint : Acute respiratory failure/COVID-19 pneumonia    Subjunctive/24 hour events :   Patient seen and examined during multidisciplinary rounds with RN, charge nurse, RT, pharmacy, dietitian, and social service. Patient on BiPAP, he tolerates breaks briefly, he tolerated BiPAP overnight, no fever, heart rate 50s to 60s, blood pressure is good, he tolerates 4 L O2 100%, denies chest pain, no nausea or vomiting, he has dementia and history is not fully reliable      Social History     Tobacco Use    Smoking status: Former Smoker    Smokeless tobacco: Never Used   Substance Use Topics    Alcohol use: No     Alcohol/week: 0.0 standard drinks         Problem Relation Age of Onset    Diabetes Mother     Heart Disease Mother     Stroke Father     Heart Disease Father     Cancer Brother     Stroke Sister        No results for input(s): PHART, XUQ4NJB, PO2ART in the last 72 hours. MV Settings:     /Vt Ordered: 6 mL/ /FiO2 : 45 %           IV:   norepinephrine Stopped (12/10/20 1719)    sodium chloride 100 mL/hr at 12/12/20 0846    dextrose      DOPamine Stopped (12/11/20 0530)       Vitals:  BP (!) 140/70   Pulse 58   Temp 97.9 °F (36.6 °C) (Axillary)   Resp 18   Ht 6' (1.829 m)   Wt 220 lb (99.8 kg)   SpO2 100%   BMI 29.84 kg/m²    Tmax:        Intake/Output Summary (Last 24 hours) at 12/12/2020 0912  Last data filed at 12/12/2020 4363  Gross per 24 hour   Intake 3509 ml   Output 2200 ml   Net 1309 ml       EXAM:  General: alert, cooperative, no distress, on BiPAP  Head: normocephalic, atraumatic  Eyes:No gross abnormalities. ENT:  MMM no lesions  Neck:  supple and no masses  Chest : Good air movement, few rales, no wheezing, nontender, tympanic  Heart[de-identified] Heart sounds are normal.  Regular rate and rhythm without murmur, gallop or rub.   ABD:  symmetric, soft, non-tender  Musculoskeletal : no cyanosis, no clubbing and no edema  Neuro: Grossly normal  Skin: No rashes or nodules noted. Lymph node:  no cervical nodes  Urology: No Polo   Psychiatric: appropriate    Medications:  Scheduled Meds:   magnesium oxide  400 mg Oral BID    insulin glargine  12 Units Subcutaneous BID    insulin lispro  0-18 Units Subcutaneous TID WC    insulin lispro  0-9 Units Subcutaneous Nightly    piperacillin-tazobactam  3.375 g Intravenous Q8H    dexamethasone  6 mg Intravenous Q24H    memantine  5 mg Oral BID    sodium chloride flush  10 mL Intravenous 2 times per day    magnesium sulfate  2 g Intravenous Once    enoxaparin  1 mg/kg Subcutaneous BID    remdesivir IVPB  100 mg Intravenous Q24H       PRN Meds:  metoprolol, glucose, dextrose, glucagon (rDNA), dextrose, sodium chloride flush, acetaminophen **OR** acetaminophen, polyethylene glycol, promethazine **OR** ondansetron    Results: reviewed by me   CBC:   Recent Labs     12/09/20  2345 12/10/20  0416 12/11/20  0514   WBC 7.4 6.4 5.6   HGB 13.0* 12.0* 13.4*   HCT 38.3* 35.3* 39.0*   MCV 90.7 91.5 90.3    314 377     BMP:   Recent Labs     12/09/20  2345 12/10/20  0416 12/11/20  0514   * 138 139   K 3.6 4.1 3.7   CL 99 107 107   CO2 18* 20 19*   BUN 15 15 15   CREATININE 0.80 0.68* 0.58*     LIVER PROFILE:   Recent Labs     12/09/20  2345 12/10/20  0416 12/11/20  0514   AST 28 23 24   ALT 22 19 21   BILITOT 0.4 <0.2 0.3   ALKPHOS 67 57 59     PT/INR: No results for input(s): PROTIME, INR in the last 72 hours. APTT: No results for input(s): APTT in the last 72 hours. UA:  Recent Labs     12/09/20 2345   COLORU RED*   PHUR 5.5   WBCUA 21-50   RBCUA *   BACTERIA FEW*   CLARITYU TURBID*   SPECGRAV 1.025   LEUKOCYTESUR MODERATE*   UROBILINOGEN 1.0   BILIRUBINUR SMALL*   BLOODU LARGE*   GLUCOSEU Negative       Cultures:  Pseudomonas in the urine  Films:  CXR reviewed by me and it showed clear  CT chest shows left lower lobe nodule    Assessment:   This is a critically ill patient at risk of deterioration / death , needing close ICU monitoring and intervention due to below noted problems   · Acute hypoxic respiratory failure, could be related to COVID-19 bronchitis however no infiltrate on CT chest  · Left lower lobe lung nodule  · Shock physiology resolved, currently off pressors  · Pseudomonas UTI  · Tachybradycardia syndrome, however blood pressure is stable  · Obstructive sleep apnea, patient tolerating BiPAP  · History of CVA and dementia    Recommendations  · Continue Zosyn  · Patient currently off dopamine and Levophed, will DC both  · BiPAP while asleep and as needed during the day  · Will need CT follow-up in 6 weeks as outpatient for left lower lobe nodule if persisted this will need further work-up including biopsy  · Watch volume status and avoid overload  · Complete 5 days of remdesivir  · Complete 10 days of dexamethasone  · DC IV fluid  · Can transfer to stepdown unit with telemetry monitoring if okay with cardiology      I spent 35 min with this patient, greater the 50% of this time was spent in counseling and/or coordinating of care.           Electronically signed by Trisha Sahni MD,  [facility] ,on 12/12/2020 at 9:12 AM

## 2020-12-12 NOTE — FLOWSHEET NOTE
AM assessment completed. Patient resting in bed at this time with TV on. Denies any chest pain at this time. Remains SB on the monitor, rate 40-60. No edema noted. Pedal pulses palpable. Shortness of breath noted with exertion/turning in the bed. Lungs are diminished bilaterally. SATS 100% on 4L NC. He is A/Ox1-to self. Definite cognitive impairment noted with delayed responses. Patient does better with yes/no questions. Denies any pain with elimination. Polo catheter to gravity drainage with clear yellow urine noted. Anali care provided, new depends placed and more zinc cream applied. Anali-area/scrotum is reddened. No open areas noted. Bilateral prevalon boots on. #20g SL to right AC, flushed and patent. #20g SL to right forearm with NS infusing at 100ml/hr. Vital signs stable and AM medications provided. Call light remains in reach.  Electronically signed by Pinky Zamarripa RN on 12/12/2020 at 2:41 PM

## 2020-12-12 NOTE — FLOWSHEET NOTE
1625  Pt arrived to [address] [facility] via bed from ICU. Vitals obtained and stable. Pt positive COVID and remains in isolation. 1745  Pt set up for dinner. Remdesivir up as ordered. Bed alarm on for patient safety. No c/o chest pain or SOB at this time. Occasional cough noted. Will continue to monitor. 1830  Pt repositioned in bed for comfort. Bed alarm on for patient safety will continue to monitor.

## 2020-12-12 NOTE — FLOWSHEET NOTE
Patient transferred to room 167 via bed from ICU.  Electronically signed by Kristine Thurston RN on 12/12/2020 at 4:14 PM

## 2020-12-13 LAB
ALBUMIN SERPL-MCNC: 2.6 G/DL (ref 3.5–4.6)
ALP BLD-CCNC: 56 U/L (ref 35–104)
ALT SERPL-CCNC: 29 U/L (ref 0–41)
ANION GAP SERPL CALCULATED.3IONS-SCNC: 10 MEQ/L (ref 9–15)
AST SERPL-CCNC: 31 U/L (ref 0–40)
BASOPHILS ABSOLUTE: 0 K/UL (ref 0–0.2)
BASOPHILS RELATIVE PERCENT: 0.3 %
BILIRUB SERPL-MCNC: 0.3 MG/DL (ref 0.2–0.7)
BUN BLDV-MCNC: 14 MG/DL (ref 8–23)
CALCIUM SERPL-MCNC: 8.7 MG/DL (ref 8.5–9.9)
CHLORIDE BLD-SCNC: 105 MEQ/L (ref 95–107)
CO2: 18 MEQ/L (ref 20–31)
CREAT SERPL-MCNC: 0.56 MG/DL (ref 0.7–1.2)
EOSINOPHILS ABSOLUTE: 0 K/UL (ref 0–0.7)
EOSINOPHILS RELATIVE PERCENT: 0 %
GFR AFRICAN AMERICAN: >60
GFR NON-AFRICAN AMERICAN: >60
GLOBULIN: 3.4 G/DL (ref 2.3–3.5)
GLUCOSE BLD-MCNC: 152 MG/DL (ref 60–115)
GLUCOSE BLD-MCNC: 165 MG/DL (ref 70–99)
GLUCOSE BLD-MCNC: 300 MG/DL (ref 60–115)
GLUCOSE BLD-MCNC: 323 MG/DL (ref 60–115)
GLUCOSE BLD-MCNC: 394 MG/DL (ref 60–115)
HCT VFR BLD CALC: 38.5 % (ref 42–52)
HEMOGLOBIN: 13.1 G/DL (ref 14–18)
LYMPHOCYTES ABSOLUTE: 0.6 K/UL (ref 1–4.8)
LYMPHOCYTES RELATIVE PERCENT: 5.5 %
MAGNESIUM: 2 MG/DL (ref 1.7–2.4)
MCH RBC QN AUTO: 30.7 PG (ref 27–31.3)
MCHC RBC AUTO-ENTMCNC: 34.2 % (ref 33–37)
MCV RBC AUTO: 89.8 FL (ref 80–100)
MONOCYTES ABSOLUTE: 0.8 K/UL (ref 0.2–0.8)
MONOCYTES RELATIVE PERCENT: 6.7 %
NEUTROPHILS ABSOLUTE: 10.3 K/UL (ref 1.4–6.5)
NEUTROPHILS RELATIVE PERCENT: 87.5 %
PDW BLD-RTO: 13.4 % (ref 11.5–14.5)
PERFORMED ON: ABNORMAL
PLATELET # BLD: 329 K/UL (ref 130–400)
POTASSIUM SERPL-SCNC: 4.1 MEQ/L (ref 3.4–4.9)
RBC # BLD: 4.28 M/UL (ref 4.7–6.1)
SODIUM BLD-SCNC: 133 MEQ/L (ref 135–144)
TOTAL PROTEIN: 6 G/DL (ref 6.3–8)
WBC # BLD: 11.8 K/UL (ref 4.8–10.8)

## 2020-12-13 PROCEDURE — 6360000002 HC RX W HCPCS: Performed by: INTERNAL MEDICINE

## 2020-12-13 PROCEDURE — 83735 ASSAY OF MAGNESIUM: CPT

## 2020-12-13 PROCEDURE — 94660 CPAP INITIATION&MGMT: CPT

## 2020-12-13 PROCEDURE — 2580000003 HC RX 258: Performed by: INTERNAL MEDICINE

## 2020-12-13 PROCEDURE — 6370000000 HC RX 637 (ALT 250 FOR IP): Performed by: INTERNAL MEDICINE

## 2020-12-13 PROCEDURE — 85025 COMPLETE CBC W/AUTO DIFF WBC: CPT

## 2020-12-13 PROCEDURE — 99232 SBSQ HOSP IP/OBS MODERATE 35: CPT | Performed by: INTERNAL MEDICINE

## 2020-12-13 PROCEDURE — 80053 COMPREHEN METABOLIC PANEL: CPT

## 2020-12-13 PROCEDURE — 36415 COLL VENOUS BLD VENIPUNCTURE: CPT

## 2020-12-13 PROCEDURE — 2500000003 HC RX 250 WO HCPCS: Performed by: INTERNAL MEDICINE

## 2020-12-13 PROCEDURE — 2700000000 HC OXYGEN THERAPY PER DAY

## 2020-12-13 PROCEDURE — 2060000000 HC ICU INTERMEDIATE R&B

## 2020-12-13 RX ADMIN — Medication 400 MG: at 08:08

## 2020-12-13 RX ADMIN — MEMANTINE HYDROCHLORIDE 5 MG: 5 TABLET ORAL at 20:37

## 2020-12-13 RX ADMIN — DEXAMETHASONE SODIUM PHOSPHATE 6 MG: 10 INJECTION INTRAMUSCULAR; INTRAVENOUS at 06:17

## 2020-12-13 RX ADMIN — ENOXAPARIN SODIUM 100 MG: 100 INJECTION SUBCUTANEOUS at 20:37

## 2020-12-13 RX ADMIN — MEMANTINE HYDROCHLORIDE 5 MG: 5 TABLET ORAL at 08:08

## 2020-12-13 RX ADMIN — PIPERACILLIN AND TAZOBACTAM 3.38 G: 3; .375 INJECTION, POWDER, LYOPHILIZED, FOR SOLUTION INTRAVENOUS at 22:32

## 2020-12-13 RX ADMIN — Medication 400 MG: at 20:37

## 2020-12-13 RX ADMIN — INSULIN GLARGINE 12 UNITS: 100 INJECTION, SOLUTION SUBCUTANEOUS at 08:10

## 2020-12-13 RX ADMIN — PIPERACILLIN AND TAZOBACTAM 3.38 G: 3; .375 INJECTION, POWDER, LYOPHILIZED, FOR SOLUTION INTRAVENOUS at 06:17

## 2020-12-13 RX ADMIN — Medication 10 ML: at 20:38

## 2020-12-13 RX ADMIN — INSULIN GLARGINE 12 UNITS: 100 INJECTION, SOLUTION SUBCUTANEOUS at 21:42

## 2020-12-13 RX ADMIN — PIPERACILLIN AND TAZOBACTAM 3.38 G: 3; .375 INJECTION, POWDER, LYOPHILIZED, FOR SOLUTION INTRAVENOUS at 14:19

## 2020-12-13 RX ADMIN — REMDESIVIR 100 MG: 5 INJECTION INTRAVENOUS at 16:21

## 2020-12-13 RX ADMIN — Medication 10 ML: at 08:08

## 2020-12-13 RX ADMIN — ENOXAPARIN SODIUM 100 MG: 100 INJECTION SUBCUTANEOUS at 08:09

## 2020-12-13 ASSESSMENT — ENCOUNTER SYMPTOMS
COUGH: 0
SHORTNESS OF BREATH: 1
GASTROINTESTINAL NEGATIVE: 1

## 2020-12-13 ASSESSMENT — PAIN SCALES - GENERAL: PAINLEVEL_OUTOF10: 0

## 2020-12-13 NOTE — PROGRESS NOTES
Progress Note  Patient: Melody Dixon  Unit/Bed: K866/R075-99  YOB: 1950  MRN: [id number]  Acct: [de-identified]   Admitting Diagnosis: Pneumonia due to COVID-19 virus [U07.1, J12.89]  Date:  12/9/2020  Hospital Day: 3    Chief Complaint:    COVID-19 positive test (U07.1, COVID-19) with Acute Pneumonia (J12.89, Other viral pneumonia)  (If respiratory failure or sepsis present, add as separate assessment)        Subjective  Comorbidities. Dementia bipolar disorder hypoxemia. Maintaining oxygen on BiPAP.   Also possible UTI  Review of Systems:   Review of Systems   Unable to perform ROS: Acuity of condition         Physical Examination:    /75   Pulse 57   Temp 98.2 °F (36.8 °C) (Oral)   Resp 18   Ht 6' (1.829 m)   Wt 199 lb 11.2 oz (90.6 kg)   SpO2 100%   BMI 27.08 kg/m²    Physical Exam    Not done because of attempt to minimize the risk of spread of Covid  LABS:  CBC:   Lab Results   Component Value Date    WBC 11.8 12/13/2020    RBC 4.28 12/13/2020    HGB 13.1 12/13/2020    HCT 38.5 12/13/2020    MCV 89.8 12/13/2020    MCH 30.7 12/13/2020    MCHC 34.2 12/13/2020    RDW 13.4 12/13/2020     12/13/2020    MPV 7.3 08/12/2012     CBC with Differential:   Lab Results   Component Value Date    WBC 11.8 12/13/2020    RBC 4.28 12/13/2020    HGB 13.1 12/13/2020    HCT 38.5 12/13/2020     12/13/2020    MCV 89.8 12/13/2020    MCH 30.7 12/13/2020    MCHC 34.2 12/13/2020    RDW 13.4 12/13/2020    LYMPHOPCT 5.5 12/13/2020    MONOPCT 6.7 12/13/2020    BASOPCT 0.3 12/13/2020    MONOSABS 0.8 12/13/2020    LYMPHSABS 0.6 12/13/2020    EOSABS 0.0 12/13/2020    BASOSABS 0.0 12/13/2020     CMP:    Lab Results   Component Value Date     12/13/2020    K 4.1 12/13/2020    K 4.3 11/28/2020     12/13/2020    CO2 18 12/13/2020    BUN 14 12/13/2020    CREATININE 0.56 12/13/2020    GFRAA >60.0 12/13/2020    LABGLOM >60.0 12/13/2020    GLUCOSE 165 12/13/2020    PROT 6.0 12/13/2020    LABALBU 2.6 12/13/2020    CALCIUM 8.7 12/13/2020    BILITOT 0.3 12/13/2020    ALKPHOS 56 12/13/2020    AST 31 12/13/2020    ALT 29 12/13/2020     BMP:    Lab Results   Component Value Date     12/13/2020    K 4.1 12/13/2020    K 4.3 11/28/2020     12/13/2020    CO2 18 12/13/2020    BUN 14 12/13/2020    LABALBU 2.6 12/13/2020    CREATININE 0.56 12/13/2020    CALCIUM 8.7 12/13/2020    GFRAA >60.0 12/13/2020    LABGLOM >60.0 12/13/2020    GLUCOSE 165 12/13/2020     Magnesium:    Lab Results   Component Value Date    MG 2.0 12/13/2020     Troponin:    Lab Results   Component Value Date    TROPONINI <0.010 11/24/2020       Radiology:  No results found. EKG:       Assessment:    Active Hospital Problems    Diagnosis Date Noted    Pneumonia due to COVID-19 virus [U07.1, J12.89] 12/10/2020         Bradycardia     Plan:  1. Hemodynamically stable. Continue present medications. Bradycardia may have been related to hypoxemia.   Will follow  Electronically signed by Stuart Mcclure MD on 12/13/2020 at 8:32 AM

## 2020-12-13 NOTE — PROGRESS NOTES
Infectious Disease     Patient Name: Rita Linares  Date: 12/13/2020  YOB: 1950  Medical Record Number: [id number]      Sepsis     Covid 19 infection with hypoxia    Probable UTI      Review of Systems   Constitutional: Negative for chills, diaphoresis and fatigue. Patient is poor historian confused   Respiratory: Positive for shortness of breath. Negative for cough. Gastrointestinal: Negative. Physical Exam   Eyes: Pupils are equal, round, and reactive to light. Cardiovascular: Normal heart sounds. No murmur heard. Pulmonary/Chest: No respiratory distress. He has no wheezes. He has no rales. Abdominal: Soft. Bowel sounds are normal. He exhibits no distension and no mass. There is no abdominal tenderness. There is no rebound. Blood pressure 121/75, pulse 57, temperature 98.2 °F (36.8 °C), temperature source Oral, resp. rate 18, height 6' (1.829 m), weight 199 lb 11.2 oz (90.6 kg), SpO2 100 %.       .   Lab Results   Component Value Date    WBC 11.8 (H) 12/13/2020    HGB 13.1 (L) 12/13/2020    HCT 38.5 (L) 12/13/2020    MCV 89.8 12/13/2020     12/13/2020     Lab Results   Component Value Date     12/13/2020    K 4.1 12/13/2020    K 4.3 11/28/2020     12/13/2020    CO2 18 12/13/2020    BUN 14 12/13/2020    CREATININE 0.56 12/13/2020    GLUCOSE 165 12/13/2020    CALCIUM 8.7 12/13/2020        Microscopic Urinalysis [[id number]] (Abnormal)  Collected: 12/09/20 2345         Updated: 12/10/20 0123        WBC, UA  21-50  /HPF         RBC, UA    /HPF         Epithelial Cells, UA  5-10  /HPF         Bacteria, UA  FEW  /HPF        Urine Reflex to Culture [[id number]] (Abnormal)  Collected: 12/09/20 2345       Specimen: Urine, clean catch  Updated: 12/10/20 0119        Color, UA  RED        Clarity, UA  TURBID        Glucose, Ur  Negative  mg/dL         Bilirubin Urine  SMALL        Ketones, Urine  15  mg/dL         Specific [place], UA  1.025      Blood, Urine  LARGE        pH, UA  5.5        Protein, UA  >=300  mg/dL         Urobilinogen, Urine  1.0  E.U./dL         Nitrite, Urine  POSITIVE        Leukocyte Esterase, Urine  MODERATE        Urine Reflex to Culture  Yes      Culture, Urine [[id number]]  (Abnormal)  Collected: 12/09/20 6865    Order Status: Completed  Specimen: Urine, clean catch  Updated: 12/11/20 1117     Organism  Pseudomonas aeruginosaAbnormal       Urine Culture, Routine  --     >100,000 CFU/ml              PLAN:  Sepsis elevated lactic acid hypotension    Covid 19 infection with hypoxia  On O2 and Decadron  Remdesivir    Probable UTI given growing Pseudomonas change to Zosyn

## 2020-12-13 NOTE — PROGRESS NOTES
Physician Progress Note    2020   8:48 AM    Name:  Jermain Vogel  MRN:    [id number]     IP Day: 3     Admit Date: 2020 11:12 PM  PCP: JERRICA Torres CNP    Code Status:  Full Code      Assessment and Plan: Active Problems/ diagnosis:     COVID-19 pneumonia  UTI  Hypertension  Paroxysmal A. fib with RVR-initially sinus bradycardia-sick sinus syndrome with episode of bradycardia  Acute metabolic encephalopathy   Hypertension  Diabetes  Hyperlipidemia      -Resume remdesivir and Decadron. He is also on Rocephin for UTI  -Cardiology is following-we will likely need pacemaker placement  -Critical care, ID are on board.  -Patient is oriented x1.   -Monitor in ICU until heart rate is stable.   -Resume remdesivir and Decadron. Switch to Zosyn for possible pseudomonal UTI  -Resume telemetry monitoring, possible pacemaker placement as per cardiology off of dopamine  -Cardiology, critical care and ID on board. 12/15  -Resume remdesivir on Decadron.  -He is on Zosyn as per infectious  -Plan for possible pacemaker, pending final cardiology recommendations. DVT PPx     Subjective:     Poor historian. Denies chest pain. Physical Examination:      Vitals:  /75   Pulse 57   Temp 98.2 °F (36.8 °C) (Oral)   Resp 18   Ht 6' (1.829 m)   Wt 199 lb 11.2 oz (90.6 kg)   SpO2 100%   BMI 27.08 kg/m²   Temp (24hrs), Av.3 °F (36.8 °C), Min:98.2 °F (36.8 °C), Max:98.4 °F (36.9 °C)      General appearance: alert, cooperative and no distress  Mental Status: oriented to person only.   Lungs: clear to auscultation bilaterally, normal effort  Heart: Regular rate  Abdomen: soft, nontender, nondistended, bowel sounds present, no masses  Extremities: noredness, tenderness in the calves  Skin: no gross lesions, rashes    Data:     Labs:  Recent Labs     20  1011 20  0617   WBC 10.4 11.8*   HGB 12.4* 13.1*    329     Recent Labs     20  1011 20  0617 * 133*   K 3.9 4.1    105   CO2 19* 18*   BUN 16 14   CREATININE 0.61* 0.56*   GLUCOSE 221* 165*     Recent Labs     12/12/20  1011 12/13/20  0617   AST 33 31   ALT 24 29   BILITOT 0.4 0.3   ALKPHOS 54 56       Current Facility-Administered Medications   Medication Dose Route Frequency Provider Last Rate Last Admin    metoprolol (LOPRESSOR) injection 5 mg  5 mg Intravenous Q6H PRN Neftaly J Holiday, DO   5 mg at 12/11/20 0907    magnesium oxide (MAG-OX) tablet 400 mg  400 mg Oral BID Neftaly J Holiday, DO   400 mg at 12/13/20 7795    insulin glargine (LANTUS) injection vial 12 Units  12 Units Subcutaneous BID Doris Gamez MD   12 Units at 12/12/20 2042    insulin lispro (HUMALOG) injection vial 0-18 Units  0-18 Units Subcutaneous TID WC Doris Gamez MD   12 Units at 12/12/20 1628    insulin lispro (HUMALOG) injection vial 0-9 Units  0-9 Units Subcutaneous Nightly Doris Gamez MD   5 Units at 12/12/20 2041    piperacillin-tazobactam (ZOSYN) 3.375 g in dextrose 5 % 50 mL IVPB extended infusion (mini-bag)  3.375 g Intravenous Q8H William Wilson MD 12.5 mL/hr at 12/13/20 0617 3.375 g at 12/13/20 0617    dexamethasone (PF) (DECADRON) injection 6 mg  6 mg Intravenous Q24H Shelia Mancini MD   6 mg at 12/13/20 0617    memantine (NAMENDA) tablet 5 mg  5 mg Oral BID Chantel Guerrero MD   5 mg at 12/13/20 0808    glucose (GLUTOSE) 40 % oral gel 15 g  15 g Oral PRN Chantel Guerrero MD        dextrose 50 % IV solution  12.5 g Intravenous PRN Chantel Guerrero MD        glucagon (rDNA) injection 1 mg  1 mg Intramuscular PRN Chantel Guerrero MD        dextrose 5 % solution  100 mL/hr Intravenous PRN Chantel Guerrero MD        sodium chloride flush 0.9 % injection 10 mL  10 mL Intravenous 2 times per day Chantel Guerrero MD   10 mL at 12/13/20 0808    sodium chloride flush 0.9 % injection 10 mL  10 mL Intravenous PRN Chantel Guerrero MD        acetaminophen (TYLENOL) tablet 650 mg  650 mg Oral Q6H PRN Chantel Guerrero MD Or    acetaminophen (TYLENOL) suppository 650 mg  650 mg Rectal Q6H PRN Christian Richter MD        polyethylene glycol (GLYCOLAX) packet 17 g  17 g Oral Daily PRN Christian Richter MD        promethazine (PHENERGAN) tablet 12.5 mg  12.5 mg Oral Q6H PRN Christian Ricther MD        Or    ondansetron [facility]) injection 4 mg  4 mg Intravenous Q6H PRN Christian Richter MD        magnesium sulfate 2 g in 50 mL IVPB premix  2 g Intravenous Once Christian Richter MD        enoxaparin (LOVENOX) injection 100 mg  1 mg/kg Subcutaneous BID Christian Richter MD   100 mg at 12/13/20 0809    remdesivir 100 mg in sodium chloride 0.9 % 250 mL IVPB  100 mg Intravenous Q24H Juan Roth MD   Stopped at 12/12/20 2200       Additional work up or/and treatment plan may be added today or then after based on clinical progression. I am managing a portion of pt care. Some medical issues are handled by other specialists. Additional work up and treatment should be done in out pt setting by pt PCP and other out pt providers. In addition to examining and evaluating pt, I spent additional time explaining care, normaland abnormal findings, and treatment plan. All of pt questions were answered. Counseling, diet and education were provided. Case will be discussed with nursing staff when appropriate. Family will be updated if and when appropriate.        Electronically signed by Ca Broussard DO on 12/13/2020 at 8:48 AM

## 2020-12-14 LAB
ALBUMIN SERPL-MCNC: 2.8 G/DL (ref 3.5–4.6)
ALP BLD-CCNC: 64 U/L (ref 35–104)
ALT SERPL-CCNC: 50 U/L (ref 0–41)
ANION GAP SERPL CALCULATED.3IONS-SCNC: 13 MEQ/L (ref 9–15)
AST SERPL-CCNC: 40 U/L (ref 0–40)
BASOPHILS ABSOLUTE: 0.1 K/UL (ref 0–0.2)
BASOPHILS RELATIVE PERCENT: 1.1 %
BILIRUB SERPL-MCNC: 0.6 MG/DL (ref 0.2–0.7)
BUN BLDV-MCNC: 14 MG/DL (ref 8–23)
CALCIUM SERPL-MCNC: 8.7 MG/DL (ref 8.5–9.9)
CHLORIDE BLD-SCNC: 100 MEQ/L (ref 95–107)
CO2: 18 MEQ/L (ref 20–31)
CREAT SERPL-MCNC: 0.63 MG/DL (ref 0.7–1.2)
EOSINOPHILS ABSOLUTE: 0 K/UL (ref 0–0.7)
EOSINOPHILS RELATIVE PERCENT: 0 %
GFR AFRICAN AMERICAN: >60
GFR NON-AFRICAN AMERICAN: >60
GLOBULIN: 3.8 G/DL (ref 2.3–3.5)
GLUCOSE BLD-MCNC: 185 MG/DL (ref 70–99)
GLUCOSE BLD-MCNC: 198 MG/DL (ref 60–115)
GLUCOSE BLD-MCNC: 274 MG/DL (ref 60–115)
GLUCOSE BLD-MCNC: 294 MG/DL (ref 60–115)
GLUCOSE BLD-MCNC: 396 MG/DL (ref 60–115)
HCT VFR BLD CALC: 39.2 % (ref 42–52)
HEMOGLOBIN: 13.8 G/DL (ref 14–18)
LYMPHOCYTES ABSOLUTE: 1 K/UL (ref 1–4.8)
LYMPHOCYTES RELATIVE PERCENT: 11.4 %
MAGNESIUM: 1.9 MG/DL (ref 1.7–2.4)
MCH RBC QN AUTO: 31 PG (ref 27–31.3)
MCHC RBC AUTO-ENTMCNC: 35 % (ref 33–37)
MCV RBC AUTO: 88.6 FL (ref 80–100)
MONOCYTES ABSOLUTE: 0.9 K/UL (ref 0.2–0.8)
MONOCYTES RELATIVE PERCENT: 9.8 %
NEUTROPHILS ABSOLUTE: 7.1 K/UL (ref 1.4–6.5)
NEUTROPHILS RELATIVE PERCENT: 77.7 %
PDW BLD-RTO: 13.2 % (ref 11.5–14.5)
PERFORMED ON: ABNORMAL
PLATELET # BLD: 315 K/UL (ref 130–400)
POTASSIUM SERPL-SCNC: 4 MEQ/L (ref 3.4–4.9)
RBC # BLD: 4.43 M/UL (ref 4.7–6.1)
SODIUM BLD-SCNC: 131 MEQ/L (ref 135–144)
TOTAL PROTEIN: 6.6 G/DL (ref 6.3–8)
WBC # BLD: 9.2 K/UL (ref 4.8–10.8)

## 2020-12-14 PROCEDURE — 6370000000 HC RX 637 (ALT 250 FOR IP): Performed by: INTERNAL MEDICINE

## 2020-12-14 PROCEDURE — 2580000003 HC RX 258: Performed by: INTERNAL MEDICINE

## 2020-12-14 PROCEDURE — 94660 CPAP INITIATION&MGMT: CPT

## 2020-12-14 PROCEDURE — 6360000002 HC RX W HCPCS: Performed by: INTERNAL MEDICINE

## 2020-12-14 PROCEDURE — 2700000000 HC OXYGEN THERAPY PER DAY

## 2020-12-14 PROCEDURE — 99232 SBSQ HOSP IP/OBS MODERATE 35: CPT | Performed by: INTERNAL MEDICINE

## 2020-12-14 PROCEDURE — 83735 ASSAY OF MAGNESIUM: CPT

## 2020-12-14 PROCEDURE — 36415 COLL VENOUS BLD VENIPUNCTURE: CPT

## 2020-12-14 PROCEDURE — 2060000000 HC ICU INTERMEDIATE R&B

## 2020-12-14 PROCEDURE — 85025 COMPLETE CBC W/AUTO DIFF WBC: CPT

## 2020-12-14 PROCEDURE — 80053 COMPREHEN METABOLIC PANEL: CPT

## 2020-12-14 PROCEDURE — 2500000003 HC RX 250 WO HCPCS: Performed by: INTERNAL MEDICINE

## 2020-12-14 RX ORDER — INSULIN GLARGINE 100 [IU]/ML
12 INJECTION, SOLUTION SUBCUTANEOUS 2 TIMES DAILY
Qty: 1 VIAL | Refills: 3 | DISCHARGE
Start: 2020-12-14 | End: 2020-12-16 | Stop reason: HOSPADM

## 2020-12-14 RX ORDER — LANOLIN ALCOHOL/MO/W.PET/CERES
400 CREAM (GRAM) TOPICAL 2 TIMES DAILY
Qty: 30 TABLET | DISCHARGE
Start: 2020-12-14

## 2020-12-14 RX ORDER — DEXAMETHASONE 6 MG/1
6 TABLET ORAL EVERY 6 HOURS
Qty: 24 TABLET | Refills: 0 | DISCHARGE
Start: 2020-12-14 | End: 2020-12-16 | Stop reason: SDUPTHER

## 2020-12-14 RX ADMIN — PIPERACILLIN AND TAZOBACTAM 3.38 G: 3; .375 INJECTION, POWDER, LYOPHILIZED, FOR SOLUTION INTRAVENOUS at 06:21

## 2020-12-14 RX ADMIN — INSULIN GLARGINE 12 UNITS: 100 INJECTION, SOLUTION SUBCUTANEOUS at 22:30

## 2020-12-14 RX ADMIN — MEMANTINE HYDROCHLORIDE 5 MG: 5 TABLET ORAL at 10:16

## 2020-12-14 RX ADMIN — MEMANTINE HYDROCHLORIDE 5 MG: 5 TABLET ORAL at 22:33

## 2020-12-14 RX ADMIN — Medication 10 ML: at 10:15

## 2020-12-14 RX ADMIN — REMDESIVIR 100 MG: 5 INJECTION INTRAVENOUS at 17:36

## 2020-12-14 RX ADMIN — PIPERACILLIN AND TAZOBACTAM 3.38 G: 3; .375 INJECTION, POWDER, LYOPHILIZED, FOR SOLUTION INTRAVENOUS at 13:41

## 2020-12-14 RX ADMIN — Medication 400 MG: at 10:16

## 2020-12-14 RX ADMIN — DEXAMETHASONE SODIUM PHOSPHATE 6 MG: 10 INJECTION INTRAMUSCULAR; INTRAVENOUS at 06:22

## 2020-12-14 RX ADMIN — Medication 10 ML: at 22:34

## 2020-12-14 RX ADMIN — PIPERACILLIN AND TAZOBACTAM 3.38 G: 3; .375 INJECTION, POWDER, LYOPHILIZED, FOR SOLUTION INTRAVENOUS at 22:33

## 2020-12-14 RX ADMIN — ENOXAPARIN SODIUM 100 MG: 100 INJECTION SUBCUTANEOUS at 10:17

## 2020-12-14 RX ADMIN — INSULIN GLARGINE 12 UNITS: 100 INJECTION, SOLUTION SUBCUTANEOUS at 10:15

## 2020-12-14 RX ADMIN — ENOXAPARIN SODIUM 100 MG: 100 INJECTION SUBCUTANEOUS at 22:32

## 2020-12-14 RX ADMIN — Medication 400 MG: at 22:33

## 2020-12-14 ASSESSMENT — PAIN SCALES - GENERAL: PAINLEVEL_OUTOF10: 0

## 2020-12-14 ASSESSMENT — ENCOUNTER SYMPTOMS
GASTROINTESTINAL NEGATIVE: 1
SHORTNESS OF BREATH: 1

## 2020-12-14 NOTE — PROGRESS NOTES
Infectious Disease     Patient Name: Stephany Aldana  Date: 12/14/2020  YOB: 1950  Medical Record Number: [id number]      Sepsis     Covid 19 infection with hypoxia    Probable UTI      Requiring CPAP 100% saturating 48% FiO2          Review of Systems   Constitutional: Negative. Respiratory: Positive for shortness of breath. Gastrointestinal: Negative. Physical Exam   Eyes: Pupils are equal, round, and reactive to light. Cardiovascular: Normal heart sounds. No murmur heard. Pulmonary/Chest: No respiratory distress. He has no wheezes. He has no rales. Abdominal: Soft. Bowel sounds are normal. He exhibits no distension and no mass. There is no abdominal tenderness. There is no rebound. Blood pressure (!) 138/92, pulse 62, temperature 98.3 °F (36.8 °C), temperature source Oral, resp. rate 20, height 6' (1.829 m), weight 193 lb 3.2 oz (87.6 kg), SpO2 100 %.       .   Lab Results   Component Value Date    WBC 9.2 12/14/2020    HGB 13.8 (L) 12/14/2020    HCT 39.2 (L) 12/14/2020    MCV 88.6 12/14/2020     12/14/2020     Lab Results   Component Value Date     12/14/2020    K 4.0 12/14/2020    K 4.3 11/28/2020     12/14/2020    CO2 18 12/14/2020    BUN 14 12/14/2020    CREATININE 0.63 12/14/2020    GLUCOSE 185 12/14/2020    CALCIUM 8.7 12/14/2020        Microscopic Urinalysis [[id number]] (Abnormal)  Collected: 12/09/20 2345         Updated: 12/10/20 0123        WBC, UA  21-50  /HPF         RBC, UA    /HPF         Epithelial Cells, UA  5-10  /HPF         Bacteria, UA  FEW  /HPF        Urine Reflex to Culture [[id number]] (Abnormal)  Collected: 12/09/20 2345       Specimen: Urine, clean catch  Updated: 12/10/20 0119        Color, UA  RED        Clarity, UA  TURBID        Glucose, Ur  Negative  mg/dL         Bilirubin Urine  SMALL        Ketones, Urine  15  mg/dL         Specific [place], UA  1.025        Blood, Urine  LARGE        pH, UA  5.5        Protein, UA  >=300  mg/dL         Urobilinogen, Urine  1.0  E.U./dL         Nitrite, Urine  POSITIVE        Leukocyte Esterase, Urine  MODERATE        Urine Reflex to Culture  Yes      Culture, Urine [[id number]]  (Abnormal)  Collected: 12/09/20 5657    Order Status: Completed  Specimen: Urine, clean catch  Updated: 12/11/20 1116     Organism  Pseudomonas aeruginosaAbnormal       Urine Culture, Routine  --     >100,000 CFU/ml              PLAN:  Sepsis         Covid 19 infection with hypoxia  On O2 and Decadron  Remdesivir         UTI given growing Pseudomonas  Zosyn

## 2020-12-14 NOTE — FLOWSHEET NOTE
2000- patient's fiancee has called several times. I placed a call out to the patient's daughter, Dioni Amaro, to ask about the fiancee and if she was ok with information being given to the finacee. The daughter  said NO because the patient gets anxious and worked up after speaking to her. Daughter stated that only minimal information should be given to her. VSS. IV in the [facility] was removed per protocol. 0630- Patient had bipap on throughout the entire night, and slept well.      Electronically signed by Hillary Santiago RN on 12/14/2020 at 6:51 AM

## 2020-12-14 NOTE — PROGRESS NOTES
Discussed with Dr. Pedrito Chakraborty, plan to reassess need for pacemaker as outpatient by cardiology. Patient can be discharge to SNF whenever the pre-CERT is back. Discharge antibiotic for UTI as per infectious disease. Discussed with case management.

## 2020-12-14 NOTE — PROGRESS NOTES
Chief Complaint   Patient presents with    Shortness of Breath     covid +     Patient is a 65 y.o. male who presents with a chief complaint of sob. . Patient is followed on a regular basis by JERRICA Hicks - CNP. Patient is currently seen in the ICU. Tested positive for COVID-19 pneumonia. He is on BiPAP at this time. Patient with history of paroxysmal atrial fibrillation on oral anticoagulation. He was noted to have heart rates in the 30s in the emergency department placed on dopamine drip. Currently he developed atrial fibrillation with rapid ventricle response with heart rate into the 60s in ICU. He is currently off of Levophed and dopamine drips. His potassium is 3.7, magnesium is 1.9. Patient with past medical history of diabetes, hyperlipidemia, hypertension, CVA and paroxysmal atrial fibrillation. Patient initially developed atrial fibrillation during hospitalization for CVA in November 2020. Echocardiogram performed at that time showed ejection-60%, mild LVH, grade 1 diastolic dysfunction. 12-14-20: no further pauses, sinus ac on tele in 50-60's.  Remains in isolation           Past Medical History        Past Medical History:   Diagnosis Date    Adrenal adenoma, right 11/2020    Bipolar 1 disorder ([address]     Cancer of skin of left ear     cured    Hearing loss     History of CVA (cerebrovascular accident)     History of tracheostomy     Hyperlipidemia     Hypertension     Left renal mass 11/2020    MCI (mild cognitive impairment) 2019    Paroxysmal A-fib (HCC)     Uncontrolled diabetes mellitus (HCC)          Patient Active Problem List   Diagnosis    Bipolar 1 disorder ([address]    Diabetes ([address]    Pneumonia    Dementia ([address]    Renal mass, left    Urinary retention    Spinal stenosis of lumbar region with neurogenic claudication    Weakness    Paroxysmal atrial fibrillation (HCC)    Acquired cavus deformity of foot    Adhesive capsulitis of shoulder    Diabetic peripheral neuropathy (HCC)    Mild cognitive disorder    Pneumonia due to COVID-19 virus     Past Surgical History         Past Surgical History:   Procedure Laterality Date    AV FISTULA REPAIR      COLONOSCOPY  10/21/15    w/polypectomy     HERNIA REPAIR      TONSILLECTOMY      TRACHEOTOMY       Social History   Social History         Socioeconomic History    Marital status:      Spouse name: None    Number of children: None    Years of education: None    Highest education level: None   Occupational History    None   Social Needs    Financial resource strain: None    Food insecurity     Worry: None     Inability: None    Transportation needs     Medical: None     Non-medical: None   Tobacco Use    Smoking status: Former Smoker    Smokeless tobacco: Never Used   Substance and Sexual Activity    Alcohol use: No     Alcohol/week: 0.0 standard drinks    Drug use: No    Sexual activity: None   Lifestyle    Physical activity     Days per week: None     Minutes per session: None    Stress: None   Relationships    Social connections     Talks on phone: None     Gets together: None     Attends [employer] service: None     Active member of club or organization: None     Attends meetings of clubs or organizations: None     Relationship status: None    Intimate partner violence     Fear of current or ex partner: None     Emotionally abused: None     Physically abused: None     Forced sexual activity: None   Other Topics Concern    None   Social History Narrative    [place], had 2 siblings, they both passed        One daughter in [place] [address] for the [employer] x 45 years     Family History         Family History   Problem Relation Age of Onset    Diabetes Mother     Heart Disease Mother     Stroke Father     Heart Disease Father     Cancer Brother     Stroke Sister      Current Facility-Administered Medications             Current Facility-Administered Medications   Medication Dose Route Frequency Provider Last Rate Last Dose    dexamethasone (PF) (DECADRON) injection 6 mg 6 mg Intravenous Q24H Julia Jamison MD  6 mg at 12/11/20 0603    cefTRIAXone (ROCEPHIN) 1 g IVPB in 50 mL D5W minibag 1 g Intravenous Q24H Julia Jamison MD  Stopped at 12/11/20 0049    norepinephrine (LEVOPHED) 16 mg in dextrose 5 % 250 mL infusion 2 mcg/min Intravenous Continuous Julia Jamison MD  Stopped at 12/10/20 1719    0.9 % sodium chloride infusion  Intravenous Continuous Julia Jamison MD  Stopped at 12/10/20 0647    insulin lispro (HUMALOG) injection vial 0-12 Units 0-12 Units Subcutaneous TID WC Julia Jamison MD  8 Units at 12/10/20 1613    insulin lispro (HUMALOG) injection vial 0-6 Units 0-6 Units Subcutaneous Nightly Julia Jamison MD  3 Units at 12/10/20 2117    memantine (NAMENDA) tablet 5 mg 5 mg Oral BID Julia Jamison MD  5 mg at 12/10/20 2101    insulin glargine (LANTUS) injection vial 6 Units 6 Units Subcutaneous BID Julia Jamison MD  6 Units at 12/10/20 2118    glucose (GLUTOSE) 40 % oral gel 15 g 15 g Oral PRN Julia Jamison MD      dextrose 50 % IV solution 12.5 g Intravenous PRN Julia Jamison MD      glucagon (rDNA) injection 1 mg 1 mg Intramuscular PRN Julia Jamison MD      dextrose 5 % solution 100 mL/hr Intravenous PRN Julia Jamison MD      sodium chloride flush 0.9 % injection 10 mL 10 mL Intravenous 2 times per day Julia Jamison MD  10 mL at 12/10/20 2101    sodium chloride flush 0.9 % injection 10 mL 10 mL Intravenous PRN Julia Jamison MD      acetaminophen (TYLENOL) tablet 650 mg 650 mg Oral Q6H PRN Julia Jamison MD      Or   [place] acetaminophen (TYLENOL) suppository 650 mg 650 mg Rectal Q6H PRN Julia Jamison MD      polyethylene glycol (GLYCOLAX) packet 17 g 17 g Oral Daily PRN Julia Jamison MD      promethazine (PHENERGAN) tablet 12.5 mg 12.5 mg Oral Q6H PRN Julia Jamison MD      Or    [facility]) injection 4 mg 4 mg Intravenous Q6H PRN Mick Gerardo MD      DOPamine (INTROPIN) 400 mg in dextrose 5 % 250 mL infusion 2.5 mcg/kg/min Intravenous Continuous Mick Gerardo MD  Stopped at 12/11/20 0530    magnesium sulfate 2 g in 50 mL IVPB premix 2 g Intravenous Once Mick Gerardo MD      enoxaparin (LOVENOX) injection 100 mg 1 mg/kg Subcutaneous BID Mick Gerardo MD  100 mg at 12/10/20 2100    remdesivir 100 mg in sodium chloride 0.9 % 250 mL IVPB 100 mg Intravenous Q24H Brandee Goyal MD     ALLERGIES: Pioglitazone   Review of Systems   Unable to perform ROS: Acuity of condition     VITALS:   Blood pressure (!) 85/54, pulse 171, temperature 98.8 °F (37.1 °C), temperature source Oral, resp. rate 29, height 6' (1.829 m), weight 220 lb (99.8 kg), SpO2 97 %. Body mass index is 29.84 kg/m². Physical Exam   As per H&P per internal medicine, limited exposure to COVID-19 pandemic.    LABS:   Recent Results         Recent Results (from the past 24 hour(s))   POCT Glucose    Collection Time: 12/10/20 4:12 PM   Result Value Ref Range    POC Glucose 309 (H) 60 - 115 mg/dl    Performed on ACCU-CHEK    POCT Glucose    Collection Time: 12/10/20 8:34 PM   Result Value Ref Range    POC Glucose 278 (H) 60 - 115 mg/dl    Performed on ACCU-CHEK    Comprehensive Metabolic Panel    Collection Time: 12/11/20 5:14 AM   Result Value Ref Range    Sodium 139 135 - 144 mEq/L    Potassium 3.7 3.4 - 4.9 mEq/L    Chloride 107 95 - 107 mEq/L    CO2 19 (L) 20 - 31 mEq/L    Anion Gap 13 9 - 15 mEq/L    Glucose 224 (H) 70 - 99 mg/dL    BUN 15 8 - 23 mg/dL    CREATININE 0.58 (L) 0.70 - 1.20 mg/dL    GFR Non-African American >60.0 >60    GFR  >60.0 >60    Calcium 8.1 (L) 8.5 - 9.9 mg/dL    Total Protein 6.3 6.3 - 8.0 g/dL    Alb 2.8 (L) 3.5 - 4.6 g/dL    Total Bilirubin 0.3 0.2 - 0.7 mg/dL    Alkaline Phosphatase 59 35 - 104 U/L    ALT 21 0 - 41 U/L    AST 24 0 - 40 U/L    Globulin 3.5 2.3 - 3.5 g/dL   CBC Auto Differential Collection Time: 12/11/20 5:14 AM   Result Value Ref Range    WBC 5.6 4.8 - 10.8 K/uL    RBC 4.32 (L) 4.70 - 6.10 M/uL    Hemoglobin 13.4 (L) 14.0 - 18.0 g/dL    Hematocrit 39.0 (L) 42.0 - 52.0 %    MCV 90.3 80.0 - 100.0 fL    MCH 31.0 27.0 - 31.3 pg    MCHC 34.4 33.0 - 37.0 %    RDW 13.3 11.5 - 14.5 %    Platelets 416 739 - 989 K/uL    Neutrophils % 78.1 %    Lymphocytes % 11.2 %    Monocytes % 10.6 %    Eosinophils % 0.0 %    Basophils % 0.1 %    Neutrophils Absolute 4.4 1.4 - 6.5 K/uL    Lymphocytes Absolute 0.6 (L) 1.0 - 4.8 K/uL    Monocytes Absolute 0.6 0.2 - 0.8 K/uL    Eosinophils Absolute 0.0 0.0 - 0.7 K/uL    Basophils Absolute 0.0 0.0 - 0.2 K/uL   Magnesium    Collection Time: 12/11/20 5:14 AM   Result Value Ref Range    Magnesium 1.9 1.7 - 2.4 mg/dL   EKG 12 Lead    Collection Time: 12/11/20 5:44 AM   Result Value Ref Range    Ventricular Rate 178 BPM    Atrial Rate 166 BPM    QRS Duration 102 ms    Q-T Interval 244 ms    QTc Calculation (Bazett) 420 ms    R Axis 80 degrees    T Axis 236 degrees   Troponin:         Lab Results   Component Value Date    TROPONINI <0.010 11/24/2020     ASSESSMENT:   Paroxysmal atrial fibrillation currently A. fib with RVR   Sick sinus syndrome with episode of bradycardia into the 30s   COVID-19 pneumonia   Normal LV function   History of CVA   Essential hypertension   Hyperlipidemia   History of diabetes       PLAN:   1. As always, aggressive risk factor modification is strongly recommended. We should adhere to the JNC VIII guidelines for HTN management and the NCEPATP III guidelines for LDL-C management. 2. Hold negative chronotropic for now. Only give as needed IV Lopressor. Would rather have patient's heart rate faster than slower. 3. Will eventually require permanent pacemaker placement when feasible. . If needed will insert transvenous pacemaker   4. Cont to monitor on tele. 5. Continue with full dose Lovenox  6.  Kerp potassium greater than 4, magnesium greater than 2  7.  GI/DVT prophylaxis

## 2020-12-14 NOTE — FLOWSHEET NOTE
0915  Pt repositioned in bed and set up for breakfast.  BIPAP removed and placed on nasal cannula. 1000  AM assessment complete. VSS. Pt has no c/o chest pain or SOB, resp unlabored, no distress noted. Lungs diminished. Occasional moist cough noted. No peripheral edema noted, pulses palpable. Pt resting quietly in bed, no distress noted at this time, will continue to monitor. Bed alarm on for safety. 1145  Pt set up for lunch. No distress noted. Bed alarm on for safety. Will continue to monitor. 1345  Pt repositioned in bed. No distress noted. Bed alarm for safety will monitor. 1740  Pt eating dinner quietly in room. No requests/complaints at this time. No acute distress noted. COVID precautions remain, will continue to monitor. 1830  Pt watching tv quietly. No distress, will continue to monitor.

## 2020-12-14 NOTE — CARE COORDINATION
LSW spoke with Dyan Lopez at [address] [place] [address]. Pt is not a long term pt at [address] [place] [address] so he will need a precert approval to return. Dyan Lopez said that they will start precert today for pt return.

## 2020-12-14 NOTE — PROGRESS NOTES
Physician Progress Note    2020   9:42 AM    Name:  Juancarlos Mclaughlin  MRN:    [id number]     IP Day: 4     Admit Date: 2020 11:12 PM  PCP: JERRICA Gonzalez CNP    Code Status:  Full Code      Assessment and Plan: Active Problems/ diagnosis:     COVID-19 pneumonia  UTI  Hypertension  Paroxysmal A. fib with RVR-initially sinus bradycardia-sick sinus syndrome with episode of bradycardia  Acute metabolic encephalopathy   Hypertension  Diabetes  Hyperlipidemia      -Resume remdesivir and Decadron. He is also on Rocephin for UTI  -Cardiology is following-we will likely need pacemaker placement  -Critical care, ID are on board.  -Patient is oriented x1.   -Monitor in ICU until heart rate is stable.   -Resume remdesivir and Decadron. Switch to Zosyn for possible pseudomonal UTI  -Resume telemetry monitoring, possible pacemaker placement as per cardiology off of dopamine  -Cardiology, critical care and ID on board.   -Resume remdesivir on Decadron.  -He is on Zosyn as per infectious  -Plan for possible pacemaker, pending final cardiology recommendations.   - pt is hemodynamically stable. Last day of remdesivir, resume decadron for 10 days total   - cardiology is following for bradycardia- no clear plan for pacemaker   - on Zosyn for UTI  - dc to SNF once ok by ID and cardiology     DVT PPx     Subjective:     Poor historian. Denies chest pain. Physical Examination:      Vitals:  BP (!) 138/92   Pulse 62   Temp 98.3 °F (36.8 °C) (Oral)   Resp 20   Ht 6' (1.829 m)   Wt 193 lb 3.2 oz (87.6 kg)   SpO2 100%   BMI 26.20 kg/m²   Temp (24hrs), Av.6 °F (37 °C), Min:98.3 °F (36.8 °C), Max:98.8 °F (37.1 °C)      General appearance: alert, cooperative and no distress  Mental Status: oriented to person only.   Lungs: clear to auscultation bilaterally, normal effort  Heart: Regular rate  Abdomen: soft, nontender, nondistended, bowel sounds present, no masses  Extremities: noredness, tenderness in the calves  Skin: no gross lesions, rashes    Data:     Labs:  Recent Labs     12/13/20  0617 12/14/20 0614   WBC 11.8* 9.2   HGB 13.1* 13.8*    315     Recent Labs     12/13/20  0617 12/14/20 0614   * 131*   K 4.1 4.0    100   CO2 18* 18*   BUN 14 14   CREATININE 0.56* 0.63*   GLUCOSE 165* 185*     Recent Labs     12/13/20 0617 12/14/20 0614   AST 31 40   ALT 29 50*   BILITOT 0.3 0.6   ALKPHOS 56 64       Current Facility-Administered Medications   Medication Dose Route Frequency Provider Last Rate Last Admin    metoprolol (LOPRESSOR) injection 5 mg  5 mg Intravenous Q6H PRN Neftaly J Holiday, DO   5 mg at 12/11/20 0907    magnesium oxide (MAG-OX) tablet 400 mg  400 mg Oral BID Neftaly J Holiday, DO   400 mg at 12/13/20 2037    insulin glargine (LANTUS) injection vial 12 Units  12 Units Subcutaneous BID Judene Apley, MD   12 Units at 12/13/20 2142    insulin lispro (HUMALOG) injection vial 0-18 Units  0-18 Units Subcutaneous TID WC Judene Apley, MD   12 Units at 12/13/20 1621    insulin lispro (HUMALOG) injection vial 0-9 Units  0-9 Units Subcutaneous Nightly Judene Apley, MD   6 Units at 12/13/20 2142    piperacillin-tazobactam (ZOSYN) 3.375 g in dextrose 5 % 50 mL IVPB extended infusion (mini-bag)  3.375 g Intravenous Q8H Munir Ibarra MD 12.5 mL/hr at 12/14/20 0621 3.375 g at 12/14/20 0621    dexamethasone (PF) (DECADRON) injection 6 mg  6 mg Intravenous Q24H Mariano Ash MD   6 mg at 12/14/20 0622    memantine (NAMENDA) tablet 5 mg  5 mg Oral BID Giovanna Guerrero MD   5 mg at 12/13/20 2037    glucose (GLUTOSE) 40 % oral gel 15 g  15 g Oral PRN Giovanna Guerrero MD        dextrose 50 % IV solution  12.5 g Intravenous PRN Giovanna Guerrero MD        glucagon (rDNA) injection 1 mg  1 mg Intramuscular PRN Giovanna Guerrero MD        dextrose 5 % solution  100 mL/hr Intravenous PRN Giovanna Guerrero MD        sodium chloride flush 0.9 % injection 10 mL 10 mL Intravenous 2 times per day David Johnson MD   10 mL at 12/13/20 2038    sodium chloride flush 0.9 % injection 10 mL  10 mL Intravenous PRN David Johnson MD        acetaminophen (TYLENOL) tablet 650 mg  650 mg Oral Q6H PRN David Johnson MD        Or   Zarina Medal acetaminophen (TYLENOL) suppository 650 mg  650 mg Rectal Q6H PRN David Johnson MD        polyethylene glycol (GLYCOLAX) packet 17 g  17 g Oral Daily PRN David Johnson MD        promethazine (PHENERGAN) tablet 12.5 mg  12.5 mg Oral Q6H PRN David Johnson MD        Or    ondansetron [facility]) injection 4 mg  4 mg Intravenous Q6H PRN David Johnson MD        magnesium sulfate 2 g in 50 mL IVPB premix  2 g Intravenous Once David Johnson MD        enoxaparin (LOVENOX) injection 100 mg  1 mg/kg Subcutaneous BID David Johnson MD   100 mg at 12/13/20 2037    remdesivir 100 mg in sodium chloride 0.9 % 250 mL IVPB  100 mg Intravenous Q24H Mk Harris MD   Stopped at 12/13/20 1744       Additional work up or/and treatment plan may be added today or then after based on clinical progression. I am managing a portion of pt care. Some medical issues are handled by other specialists. Additional work up and treatment should be done in out pt setting by pt PCP and other out pt providers. In addition to examining and evaluating pt, I spent additional time explaining care, normaland abnormal findings, and treatment plan. All of pt questions were answered. Counseling, diet and education were provided. Case will be discussed with nursing staff when appropriate. Family will be updated if and when appropriate.        Electronically signed by Katharina Fleming DO on 12/14/2020 at 9:42 AM

## 2020-12-15 LAB
ALBUMIN SERPL-MCNC: 2.9 G/DL (ref 3.5–4.6)
ALP BLD-CCNC: 68 U/L (ref 35–104)
ALT SERPL-CCNC: 39 U/L (ref 0–41)
ANION GAP SERPL CALCULATED.3IONS-SCNC: 13 MEQ/L (ref 9–15)
AST SERPL-CCNC: 21 U/L (ref 0–40)
BASOPHILS ABSOLUTE: 0 K/UL (ref 0–0.2)
BASOPHILS RELATIVE PERCENT: 0.3 %
BILIRUB SERPL-MCNC: 0.4 MG/DL (ref 0.2–0.7)
BLOOD CULTURE, ROUTINE: NORMAL
BUN BLDV-MCNC: 18 MG/DL (ref 8–23)
CALCIUM SERPL-MCNC: 8.6 MG/DL (ref 8.5–9.9)
CHLORIDE BLD-SCNC: 99 MEQ/L (ref 95–107)
CO2: 19 MEQ/L (ref 20–31)
CREAT SERPL-MCNC: 0.62 MG/DL (ref 0.7–1.2)
CULTURE, BLOOD 2: NORMAL
EOSINOPHILS ABSOLUTE: 0 K/UL (ref 0–0.7)
EOSINOPHILS RELATIVE PERCENT: 0 %
GFR AFRICAN AMERICAN: >60
GFR NON-AFRICAN AMERICAN: >60
GLOBULIN: 3.4 G/DL (ref 2.3–3.5)
GLUCOSE BLD-MCNC: 252 MG/DL (ref 60–115)
GLUCOSE BLD-MCNC: 262 MG/DL (ref 70–99)
GLUCOSE BLD-MCNC: 271 MG/DL (ref 60–115)
GLUCOSE BLD-MCNC: 303 MG/DL (ref 60–115)
GLUCOSE BLD-MCNC: 374 MG/DL (ref 60–115)
HCT VFR BLD CALC: 39.5 % (ref 42–52)
HEMOGLOBIN: 13.5 G/DL (ref 14–18)
LYMPHOCYTES ABSOLUTE: 0.9 K/UL (ref 1–4.8)
LYMPHOCYTES RELATIVE PERCENT: 11.3 %
MAGNESIUM: 2 MG/DL (ref 1.7–2.4)
MCH RBC QN AUTO: 30.2 PG (ref 27–31.3)
MCHC RBC AUTO-ENTMCNC: 34.3 % (ref 33–37)
MCV RBC AUTO: 88.2 FL (ref 80–100)
MONOCYTES ABSOLUTE: 0.8 K/UL (ref 0.2–0.8)
MONOCYTES RELATIVE PERCENT: 10.7 %
NEUTROPHILS ABSOLUTE: 6.1 K/UL (ref 1.4–6.5)
NEUTROPHILS RELATIVE PERCENT: 77.7 %
PDW BLD-RTO: 13.4 % (ref 11.5–14.5)
PERFORMED ON: ABNORMAL
PLATELET # BLD: 334 K/UL (ref 130–400)
POTASSIUM SERPL-SCNC: 4.2 MEQ/L (ref 3.4–4.9)
RBC # BLD: 4.48 M/UL (ref 4.7–6.1)
SODIUM BLD-SCNC: 131 MEQ/L (ref 135–144)
TOTAL PROTEIN: 6.3 G/DL (ref 6.3–8)
WBC # BLD: 7.9 K/UL (ref 4.8–10.8)

## 2020-12-15 PROCEDURE — 6360000002 HC RX W HCPCS: Performed by: INTERNAL MEDICINE

## 2020-12-15 PROCEDURE — 6370000000 HC RX 637 (ALT 250 FOR IP): Performed by: INTERNAL MEDICINE

## 2020-12-15 PROCEDURE — 83735 ASSAY OF MAGNESIUM: CPT

## 2020-12-15 PROCEDURE — 2060000000 HC ICU INTERMEDIATE R&B

## 2020-12-15 PROCEDURE — 85025 COMPLETE CBC W/AUTO DIFF WBC: CPT

## 2020-12-15 PROCEDURE — 2580000003 HC RX 258: Performed by: INTERNAL MEDICINE

## 2020-12-15 PROCEDURE — 80053 COMPREHEN METABOLIC PANEL: CPT

## 2020-12-15 PROCEDURE — 2700000000 HC OXYGEN THERAPY PER DAY

## 2020-12-15 PROCEDURE — 99232 SBSQ HOSP IP/OBS MODERATE 35: CPT | Performed by: INTERNAL MEDICINE

## 2020-12-15 PROCEDURE — 99233 SBSQ HOSP IP/OBS HIGH 50: CPT | Performed by: INTERNAL MEDICINE

## 2020-12-15 PROCEDURE — 94660 CPAP INITIATION&MGMT: CPT

## 2020-12-15 PROCEDURE — 36415 COLL VENOUS BLD VENIPUNCTURE: CPT

## 2020-12-15 RX ORDER — INSULIN GLARGINE 100 [IU]/ML
15 INJECTION, SOLUTION SUBCUTANEOUS 2 TIMES DAILY
Status: DISCONTINUED | OUTPATIENT
Start: 2020-12-15 | End: 2020-12-16 | Stop reason: HOSPADM

## 2020-12-15 RX ORDER — INSULIN GLARGINE 100 [IU]/ML
18 INJECTION, SOLUTION SUBCUTANEOUS 2 TIMES DAILY
Status: DISCONTINUED | OUTPATIENT
Start: 2020-12-15 | End: 2020-12-15

## 2020-12-15 RX ADMIN — PIPERACILLIN AND TAZOBACTAM 3.38 G: 3; .375 INJECTION, POWDER, LYOPHILIZED, FOR SOLUTION INTRAVENOUS at 14:24

## 2020-12-15 RX ADMIN — INSULIN GLARGINE 12 UNITS: 100 INJECTION, SOLUTION SUBCUTANEOUS at 12:06

## 2020-12-15 RX ADMIN — DEXAMETHASONE SODIUM PHOSPHATE 6 MG: 10 INJECTION INTRAMUSCULAR; INTRAVENOUS at 06:12

## 2020-12-15 RX ADMIN — RIVAROXABAN 20 MG: 20 TABLET, FILM COATED ORAL at 10:21

## 2020-12-15 RX ADMIN — Medication 10 ML: at 21:59

## 2020-12-15 RX ADMIN — PIPERACILLIN AND TAZOBACTAM 3.38 G: 3; .375 INJECTION, POWDER, LYOPHILIZED, FOR SOLUTION INTRAVENOUS at 06:12

## 2020-12-15 RX ADMIN — MEMANTINE HYDROCHLORIDE 5 MG: 5 TABLET ORAL at 08:08

## 2020-12-15 RX ADMIN — PIPERACILLIN AND TAZOBACTAM 3.38 G: 3; .375 INJECTION, POWDER, LYOPHILIZED, FOR SOLUTION INTRAVENOUS at 21:55

## 2020-12-15 RX ADMIN — Medication 400 MG: at 21:55

## 2020-12-15 RX ADMIN — INSULIN GLARGINE 15 UNITS: 100 INJECTION, SOLUTION SUBCUTANEOUS at 21:50

## 2020-12-15 RX ADMIN — Medication 10 ML: at 08:08

## 2020-12-15 RX ADMIN — MEMANTINE HYDROCHLORIDE 5 MG: 5 TABLET ORAL at 21:55

## 2020-12-15 RX ADMIN — Medication 400 MG: at 08:08

## 2020-12-15 ASSESSMENT — ENCOUNTER SYMPTOMS
SHORTNESS OF BREATH: 1
GASTROINTESTINAL NEGATIVE: 1

## 2020-12-15 ASSESSMENT — PAIN SCALES - GENERAL: PAINLEVEL_OUTOF10: 0

## 2020-12-15 NOTE — PROGRESS NOTES
Infectious Disease     Patient Name: Ana Garcia  Date: 12/15/2020  YOB: 1950  Medical Record Number: [id number]      Sepsis     Covid 19 infection with hypoxia    Probable UTI    99%  On cpap    Review of Systems   Constitutional: Negative. Respiratory: Positive for shortness of breath. Gastrointestinal: Negative. Physical Exam   Eyes: Pupils are equal, round, and reactive to light. Cardiovascular: Normal heart sounds. No murmur heard. Pulmonary/Chest: No respiratory distress. He has no wheezes. He has no rales. Abdominal: Soft. Bowel sounds are normal. He exhibits no distension and no mass. There is no abdominal tenderness. There is no rebound. Blood pressure 95/70, pulse 53, temperature 98.3 °F (36.8 °C), temperature source Oral, resp. rate 20, height 6' (1.829 m), weight 190 lb 1.6 oz (86.2 kg), SpO2 99 %.       .   Lab Results   Component Value Date    WBC 7.9 12/15/2020    HGB 13.5 (L) 12/15/2020    HCT 39.5 (L) 12/15/2020    MCV 88.2 12/15/2020     12/15/2020     Lab Results   Component Value Date     12/15/2020    K 4.2 12/15/2020    K 4.3 11/28/2020    CL 99 12/15/2020    CO2 19 12/15/2020    BUN 18 12/15/2020    CREATININE 0.62 12/15/2020    GLUCOSE 262 12/15/2020    CALCIUM 8.6 12/15/2020        Microscopic Urinalysis [[id number]] (Abnormal)  Collected: 12/09/20 2345         Updated: 12/10/20 0123        WBC, UA  21-50  /HPF         RBC, UA    /HPF         Epithelial Cells, UA  5-10  /HPF         Bacteria, UA  FEW  /HPF        Urine Reflex to Culture [[id number]] (Abnormal)  Collected: 12/09/20 2345       Specimen: Urine, clean catch  Updated: 12/10/20 0119        Color, UA  RED        Clarity, UA  TURBID        Glucose, Ur  Negative  mg/dL         Bilirubin Urine  SMALL        Ketones, Urine  15  mg/dL         Specific Jackson, UA  1.025        Blood, Urine  LARGE        pH, UA  5.5        Protein, UA  >=300  mg/dL         Urobilinogen,

## 2020-12-15 NOTE — PROGRESS NOTES
Chief Complaint   Patient presents with    Shortness of Breath     covid +     Patient is a 65 y.o. male who presents with a chief complaint of sob. . Patient is followed on a regular basis by JERRICA Rodríguez - CNP. Patient is currently seen in the ICU. Tested positive for COVID-19 pneumonia. He is on BiPAP at this time. Patient with history of paroxysmal atrial fibrillation on oral anticoagulation. He was noted to have heart rates in the 30s in the emergency department placed on dopamine drip. Currently he developed atrial fibrillation with rapid ventricle response with heart rate into the 60s in ICU. He is currently off of Levophed and dopamine drips. His potassium is 3.7, magnesium is 1.9. Patient with past medical history of diabetes, hyperlipidemia, hypertension, CVA and paroxysmal atrial fibrillation. Patient initially developed atrial fibrillation during hospitalization for CVA in November 2020. Echocardiogram performed at that time showed ejection-60%, mild LVH, grade 1 diastolic dysfunction. 12-15-20: no further pauses, sinus ac on tele in 50-60's. Remains in isolation   Awaiting transfer to nursing home. No new events overnight. Discussed with internal medicine yesterday.           Past Medical History        Past Medical History:   Diagnosis Date    Adrenal adenoma, right 11/2020    Bipolar 1 disorder ([address]     Cancer of skin of left ear     cured    Hearing loss     History of CVA (cerebrovascular accident)     History of tracheostomy     Hyperlipidemia     Hypertension     Left renal mass 11/2020    MCI (mild cognitive impairment) 2019    Paroxysmal A-fib (HCC)     Uncontrolled diabetes mellitus ([address]          Patient Active Problem List   Diagnosis    Bipolar 1 disorder ([address]    Diabetes ([address]    Pneumonia    Dementia ([address]    Renal mass, left    Urinary retention    Spinal stenosis of lumbar region with neurogenic claudication    Weakness    Paroxysmal atrial fibrillation (HCC)    Acquired cavus deformity of foot    Adhesive capsulitis of shoulder    Diabetic peripheral neuropathy (HCC)    Mild cognitive disorder    Pneumonia due to COVID-19 virus     Past Surgical History         Past Surgical History:   Procedure Laterality Date    AV FISTULA REPAIR      COLONOSCOPY  10/21/15    w/polypectomy     HERNIA REPAIR      TONSILLECTOMY      TRACHEOTOMY       Social History   Social History         Socioeconomic History    Marital status:      Spouse name: None    Number of children: None    Years of education: None    Highest education level: None   Occupational History    None   Social Needs    Financial resource strain: None    Food insecurity     Worry: None     Inability: None    Transportation needs     Medical: None     Non-medical: None   Tobacco Use    Smoking status: Former Smoker    Smokeless tobacco: Never Used   Substance and Sexual Activity    Alcohol use: No     Alcohol/week: 0.0 standard drinks    Drug use: No    Sexual activity: None   Lifestyle    Physical activity     Days per week: None     Minutes per session: None    Stress: None   Relationships    Social connections     Talks on phone: None     Gets together: None     Attends Bahai service: None     Active member of club or organization: None     Attends meetings of clubs or organizations: None     Relationship status: None    Intimate partner violence     Fear of current or ex partner: None     Emotionally abused: None     Physically abused: None     Forced sexual activity: None   Other Topics Concern    None   Social History Narrative    Born in [address] [place] [address] , had 2 siblings, they both passed        One daughter in [place] [id number] for the [employer] x 45 years     Family History         Family History   Problem Relation Age of Onset    Diabetes Mother     Heart Disease Mother     Stroke Father     Heart Disease Father     Cancer Brother     Stroke Sister      Current Facility-Administered Medications             Current Facility-Administered Medications   Medication Dose Route Frequency Provider Last Rate Last Dose    dexamethasone (PF) (DECADRON) injection 6 mg 6 mg Intravenous Q24H Ashsih Bravo MD  6 mg at 12/11/20 0603    cefTRIAXone (ROCEPHIN) 1 g IVPB in 50 mL D5W minibag 1 g Intravenous Q24H Ashish Bravo MD  Stopped at 12/11/20 0049    norepinephrine (LEVOPHED) 16 mg in dextrose 5 % 250 mL infusion 2 mcg/min Intravenous Continuous Ashish Bravo MD  Stopped at 12/10/20 1719    0.9 % sodium chloride infusion  Intravenous Continuous Ashish Bravo MD  Stopped at 12/10/20 0647    insulin lispro (HUMALOG) injection vial 0-12 Units 0-12 Units Subcutaneous TID WC Ashish Bravo MD  8 Units at 12/10/20 1613    insulin lispro (HUMALOG) injection vial 0-6 Units 0-6 Units Subcutaneous Nightly Ashish Bravo MD  3 Units at 12/10/20 2117    memantine (NAMENDA) tablet 5 mg 5 mg Oral BID Ashish Bravo MD  5 mg at 12/10/20 2101    insulin glargine (LANTUS) injection vial 6 Units 6 Units Subcutaneous BID Ashish Bravo MD  6 Units at 12/10/20 2118    glucose (GLUTOSE) 40 % oral gel 15 g 15 g Oral PRN Ashish Bravo MD      dextrose 50 % IV solution 12.5 g Intravenous PRN Ashish Bravo MD      glucagon (rDNA) injection 1 mg 1 mg Intramuscular PRN Ashish Bravo MD      dextrose 5 % solution 100 mL/hr Intravenous PRDENI Bravo MD      sodium chloride flush 0.9 % injection 10 mL 10 mL Intravenous 2 times per day Ashish Bravo MD  10 mL at 12/10/20 2101    sodium chloride flush 0.9 % injection 10 mL 10 mL Intravenous PRN Ashish Bravo MD      acetaminophen (TYLENOL) tablet 650 mg 650 mg Oral Q6H PRN Ashish Bravo MD      Or    acetaminophen (TYLENOL) suppository 650 mg 650 mg Rectal Q6H PRN Ashish Bravo MD      polyethylene glycol (GLYCOLAX) packet 17 g 17 g Oral Daily PRN Ashish Bravo MD      promethazine (PHENERGAN) tablet 12.5 mg 12.5 mg Oral Q6H PRN Chinedu Smith MD      Or    ondansetron [facility]) injection 4 mg 4 mg Intravenous Q6H PRN Chinedu Smith MD      DOPamine (INTROPIN) 400 mg in dextrose 5 % 250 mL infusion 2.5 mcg/kg/min Intravenous Continuous Chinedu Smith MD  Stopped at 12/11/20 0530    magnesium sulfate 2 g in 50 mL IVPB premix 2 g Intravenous Once Chinedu Smith MD      enoxaparin (LOVENOX) injection 100 mg 1 mg/kg Subcutaneous BID Chinedu Smith MD  100 mg at 12/10/20 2100    remdesivir 100 mg in sodium chloride 0.9 % 250 mL IVPB 100 mg Intravenous Q24H Emma Chahal MD     ALLERGIES: Pioglitazone   Review of Systems   Unable to perform ROS: Acuity of condition     VITALS:   Blood pressure (!) 85/54, pulse 171, temperature 98.8 °F (37.1 °C), temperature source Oral, resp. rate 29, height 6' (1.829 m), weight 220 lb (99.8 kg), SpO2 97 %. Body mass index is 29.84 kg/m². Physical Exam   As per H&P per internal medicine, limited exposure to COVID-19 pandemic.    LABS:   Recent Results         Recent Results (from the past 24 hour(s))   POCT Glucose    Collection Time: 12/10/20 4:12 PM   Result Value Ref Range    POC Glucose 309 (H) 60 - 115 mg/dl    Performed on ACCU-CHEK    POCT Glucose    Collection Time: 12/10/20 8:34 PM   Result Value Ref Range    POC Glucose 278 (H) 60 - 115 mg/dl    Performed on ACCU-CHEK    Comprehensive Metabolic Panel    Collection Time: 12/11/20 5:14 AM   Result Value Ref Range    Sodium 139 135 - 144 mEq/L    Potassium 3.7 3.4 - 4.9 mEq/L    Chloride 107 95 - 107 mEq/L    CO2 19 (L) 20 - 31 mEq/L    Anion Gap 13 9 - 15 mEq/L    Glucose 224 (H) 70 - 99 mg/dL    BUN 15 8 - 23 mg/dL    CREATININE 0.58 (L) 0.70 - 1.20 mg/dL    GFR Non-African American >60.0 >60    GFR  >60.0 >60    Calcium 8.1 (L) 8.5 - 9.9 mg/dL    Total Protein 6.3 6.3 - 8.0 g/dL    Alb 2.8 (L) 3.5 - 4.6 g/dL    Total Bilirubin 0.3 0.2 - 0.7 mg/dL    Alkaline Phosphatase 59 35 - 104 U/L    ALT 21 outpatient. 4. Cont to monitor on tele. 5. Transition from Lovenox to full dose Xarelto 20 mg once daily  6. Kerp potassium greater than 4, magnesium greater than 2  7. GI/DVT prophylaxis  8. Stable for discharge from cardiology standpoint.

## 2020-12-16 VITALS
OXYGEN SATURATION: 100 % | DIASTOLIC BLOOD PRESSURE: 61 MMHG | TEMPERATURE: 98.7 F | HEART RATE: 58 BPM | HEIGHT: 72 IN | SYSTOLIC BLOOD PRESSURE: 103 MMHG | RESPIRATION RATE: 16 BRPM | WEIGHT: 190.1 LBS | BODY MASS INDEX: 25.75 KG/M2

## 2020-12-16 LAB
ALBUMIN SERPL-MCNC: 3 G/DL (ref 3.5–4.6)
ALP BLD-CCNC: 69 U/L (ref 35–104)
ALT SERPL-CCNC: 37 U/L (ref 0–41)
ANION GAP SERPL CALCULATED.3IONS-SCNC: 13 MEQ/L (ref 9–15)
AST SERPL-CCNC: 20 U/L (ref 0–40)
BASOPHILS ABSOLUTE: 0 K/UL (ref 0–0.2)
BASOPHILS RELATIVE PERCENT: 0.2 %
BILIRUB SERPL-MCNC: 0.4 MG/DL (ref 0.2–0.7)
BUN BLDV-MCNC: 18 MG/DL (ref 8–23)
CALCIUM SERPL-MCNC: 9.6 MG/DL (ref 8.5–9.9)
CHLORIDE BLD-SCNC: 101 MEQ/L (ref 95–107)
CO2: 21 MEQ/L (ref 20–31)
CREAT SERPL-MCNC: 0.61 MG/DL (ref 0.7–1.2)
EOSINOPHILS ABSOLUTE: 0 K/UL (ref 0–0.7)
EOSINOPHILS RELATIVE PERCENT: 0 %
GFR AFRICAN AMERICAN: >60
GFR NON-AFRICAN AMERICAN: >60
GLOBULIN: 3.4 G/DL (ref 2.3–3.5)
GLUCOSE BLD-MCNC: 161 MG/DL (ref 60–115)
GLUCOSE BLD-MCNC: 192 MG/DL (ref 70–99)
GLUCOSE BLD-MCNC: 220 MG/DL (ref 60–115)
GLUCOSE BLD-MCNC: 226 MG/DL (ref 60–115)
GLUCOSE BLD-MCNC: 278 MG/DL (ref 60–115)
GLUCOSE BLD-MCNC: 288 MG/DL (ref 60–115)
HCT VFR BLD CALC: 41.4 % (ref 42–52)
HEMOGLOBIN: 14.3 G/DL (ref 14–18)
LYMPHOCYTES ABSOLUTE: 0.9 K/UL (ref 1–4.8)
LYMPHOCYTES RELATIVE PERCENT: 8.3 %
MAGNESIUM: 1.9 MG/DL (ref 1.7–2.4)
MCH RBC QN AUTO: 31 PG (ref 27–31.3)
MCHC RBC AUTO-ENTMCNC: 34.6 % (ref 33–37)
MCV RBC AUTO: 89.5 FL (ref 80–100)
MONOCYTES ABSOLUTE: 0.9 K/UL (ref 0.2–0.8)
MONOCYTES RELATIVE PERCENT: 7.9 %
NEUTROPHILS ABSOLUTE: 9.2 K/UL (ref 1.4–6.5)
NEUTROPHILS RELATIVE PERCENT: 83.6 %
PDW BLD-RTO: 13.6 % (ref 11.5–14.5)
PERFORMED ON: ABNORMAL
PLATELET # BLD: 370 K/UL (ref 130–400)
POTASSIUM SERPL-SCNC: 4.3 MEQ/L (ref 3.4–4.9)
RBC # BLD: 4.63 M/UL (ref 4.7–6.1)
SODIUM BLD-SCNC: 135 MEQ/L (ref 135–144)
TOTAL PROTEIN: 6.4 G/DL (ref 6.3–8)
WBC # BLD: 11 K/UL (ref 4.8–10.8)

## 2020-12-16 PROCEDURE — 2580000003 HC RX 258: Performed by: INTERNAL MEDICINE

## 2020-12-16 PROCEDURE — 6370000000 HC RX 637 (ALT 250 FOR IP): Performed by: INTERNAL MEDICINE

## 2020-12-16 PROCEDURE — 80053 COMPREHEN METABOLIC PANEL: CPT

## 2020-12-16 PROCEDURE — 94761 N-INVAS EAR/PLS OXIMETRY MLT: CPT

## 2020-12-16 PROCEDURE — 85025 COMPLETE CBC W/AUTO DIFF WBC: CPT

## 2020-12-16 PROCEDURE — 6360000002 HC RX W HCPCS: Performed by: INTERNAL MEDICINE

## 2020-12-16 PROCEDURE — 36415 COLL VENOUS BLD VENIPUNCTURE: CPT

## 2020-12-16 PROCEDURE — 99233 SBSQ HOSP IP/OBS HIGH 50: CPT | Performed by: INTERNAL MEDICINE

## 2020-12-16 PROCEDURE — 99232 SBSQ HOSP IP/OBS MODERATE 35: CPT | Performed by: INTERNAL MEDICINE

## 2020-12-16 PROCEDURE — 83735 ASSAY OF MAGNESIUM: CPT

## 2020-12-16 RX ORDER — DEXAMETHASONE 1.5 MG/1
3 TABLET ORAL
Qty: 6 TABLET | Refills: 0 | DISCHARGE
Start: 2020-12-16 | End: 2020-12-19

## 2020-12-16 RX ORDER — INSULIN GLARGINE 100 [IU]/ML
16 INJECTION, SOLUTION SUBCUTANEOUS 2 TIMES DAILY
Qty: 1 VIAL | Refills: 3 | DISCHARGE
Start: 2020-12-16

## 2020-12-16 RX ORDER — CIPROFLOXACIN 500 MG/1
500 TABLET, FILM COATED ORAL 2 TIMES DAILY
Qty: 10 TABLET | Refills: 0
Start: 2020-12-16 | End: 2020-12-21

## 2020-12-16 RX ADMIN — INSULIN GLARGINE 15 UNITS: 100 INJECTION, SOLUTION SUBCUTANEOUS at 11:00

## 2020-12-16 RX ADMIN — PIPERACILLIN AND TAZOBACTAM 3.38 G: 3; .375 INJECTION, POWDER, LYOPHILIZED, FOR SOLUTION INTRAVENOUS at 06:46

## 2020-12-16 RX ADMIN — MEMANTINE HYDROCHLORIDE 5 MG: 5 TABLET ORAL at 09:52

## 2020-12-16 RX ADMIN — DEXAMETHASONE SODIUM PHOSPHATE 6 MG: 10 INJECTION INTRAMUSCULAR; INTRAVENOUS at 06:45

## 2020-12-16 RX ADMIN — Medication 400 MG: at 09:53

## 2020-12-16 NOTE — PROGRESS NOTES
Infectious Diseases Inpatient Progress Note          HISTORY OF PRESENT ILLNESS:  Follow up Pseudomonas UTI and COVID-19 pneumonia, status post remdesivir on IV Zosyn, well tolerated. Patient had remarkable clinical improvement with decreased shortness of breath and cough. Generalized weakness. No abdominal pain. Positive Polo catheter. Poor appetite. No diarrhea    Current Medications:     rivaroxaban  20 mg Oral Daily    insulin glargine  15 Units Subcutaneous BID    insulin lispro  10 Units Subcutaneous TID WC    magnesium oxide  400 mg Oral BID    insulin lispro  0-18 Units Subcutaneous TID WC    insulin lispro  0-9 Units Subcutaneous Nightly    piperacillin-tazobactam  3.375 g Intravenous Q8H    dexamethasone  6 mg Intravenous Q24H    memantine  5 mg Oral BID    sodium chloride flush  10 mL Intravenous 2 times per day    magnesium sulfate  2 g Intravenous Once       Allergies:  Pioglitazone      Review of Systems  14 system review is negative other than HPI    Physical Exam  Vitals:    12/15/20 2301 12/16/20 0810 12/16/20 0954 12/16/20 1242   BP:   (!) 95/51 (!) 98/56   Pulse:   65 60   Resp: 22  18 16   Temp:   98.4 °F (36.9 °C)    TempSrc:   Oral    SpO2:  100% 99%    Weight:       Height:         General Appearance: alert and oriented, well-developed and well-nourished, in no acute distress  Skin: warm and dry, no rash. Head: normocephalic and atraumatic  Eyes: anicteric sclerae  ENT: oropharynx clear and moist with normal mucous membranes.  No oral thrush  Lungs: normal respiratory effort, diminished breath sounds bilateral lung fields  Heart normal S1-S2 no murmur  Abdomen: soft, no tenderness  No leg edema  No erythema, no tenderness  Polo catheter with yellow urine no sediment      DATA:    Lab Results   Component Value Date    WBC 11.0 (H) 12/16/2020    HGB 14.3 12/16/2020    HCT 41.4 (L) 12/16/2020    MCV 89.5 12/16/2020     12/16/2020     Lab Results   Component Value Date CREATININE 0.61 (L) 12/16/2020    BUN 18 12/16/2020     12/16/2020    K 4.3 12/16/2020     12/16/2020    CO2 21 12/16/2020       Hepatic Function Panel:  Lab Results   Component Value Date    ALKPHOS 69 12/16/2020    ALT 37 12/16/2020    AST 20 12/16/2020    PROT 6.4 12/16/2020    BILITOT 0.4 12/16/2020    LABALBU 3.0 12/16/2020       IMPRESSION:    · Acute lower UTI with Pseudomonas aeruginosa  · COVID-19 pneumonia with acute respiratory failure and hypoxia, improved    Patient Active Problem List   Diagnosis    Bipolar 1 disorder ([address]    [facility]    Pneumonia    Dementia ([facility]    Renal mass, left    Urinary retention    Spinal stenosis of lumbar region with neurogenic claudication    Weakness    Paroxysmal atrial fibrillation (HCC)    Acquired cavus deformity of foot    Adhesive capsulitis of shoulder    Diabetic peripheral neuropathy (HCC)    Mild cognitive disorder    Pneumonia due to COVID-19 virus       PLAN:  · S/C Zosyn  · D/C to NH on PO Cipro x 5 days  · Oxygen support and weaning as tolerated    Discussed with Dr Ammy Freedman, patient and MAYA Shirley MD

## 2020-12-16 NOTE — DISCHARGE SUMMARY
Hospital Medicine Discharge Summary    Stephany Aldana  :  1950  MRN:  [id number]    Admit date:  2020  Discharge date:  20    Admitting Physician: Adore Barbour MD  Primary Care Physician:  Deisy Quinonez APRN - CNP      Discharge Diagnoses: Active Problems:    Pneumonia due to COVID-19 virus  Resolved Problems:    * No resolved hospital problems. *  Pseudomonas UTI.  DM2. Dementia  Hypertension  Hyperlipidemia  Bradycardia    Hospital Course:   Stephany Aldana is a 65 y.o. male that was admitted and treated at [facility] for acute hypoxic respiratory failure secondary to COVID-19 pneumonia. Patient was started on Decadron along with remdesivir with slow steady improvement. Patient will continue on CPAP for SHENG. Patient had noted bradycardia during hospitalization all laazro blocking agents were stopped and patient will follow up with cardiology in 3 to 4 weeks for possible pacemaker placement. CT of the chest also showed a possible kidney mass which is noted on predischarge review of imaging and will be recommended to follow-up with urology in 2 to 3 weeks for further recommendations. Patient has a noted UTI which is culture positive for Pseudomonas aeruginosa is currently on Zosyn. Further antibiotics on discharge be determined by infectious disease. Patient will discharge on Lantus 16 units twice nightly and Humalog 7 units 3 times daily. Patient will continue with Xarelto on discharge for history of CVA. Patient was seen by the following consultants while admitted to [facility]:   Consults:  [address] TO CARDIOLOGY    Physical Exam:   General appearance: No apparent distress, appears stated age and cooperative. HEENT: Pupils equal, round, and reactive to light. Conjunctivae/corneas clear. Neck: Supple, with full range of motion. No jugular venous distention. Trachea midline.   Respiratory: AP and cephalocaudad dimension.       There is multilevel degenerative change with bridging osteophytes of the visualized thoracic spine.               Impression   1.  NO EVIDENCE OF CENTRAL OR SEGMENTAL PULMONARY EMBOLISM.     2.  THERE ARE BIBASILAR AREAS ATELECTASIS, AND OPACITIES WITHIN BOTH BASES LEFT GREATER THAN RIGHT AS DESCRIBED ABOVE. WHILE THESE MAY REPRESENT DEVELOPING FOCAL CONSOLIDATIONS UNDERLYING SOFT TISSUE MASS MALIGNANCY IS NOT EXCLUDED ESPECIALLY GIVEN THE    FINDINGS WITHIN THE LEFT KIDNEY AS DESCRIBED BELOW. WILL NEED CONTINUED FOLLOW-UP. 3. PARTIALLY VISUALIZED COMPLEX MASS LEFT KIDNEY. SOME OF THE APPEARANCE OF THE CT UROGRAM OF NOVEMBER 23, 2020. FINDINGS SHOULD REMAIN SUSPICIOUS FOR THAT OF MALIGNANCY UNTIL PROVEN OTHERWISE.       All CT scans at this facility use dose modulation, iterative reconstruction, and/or weight based dosing when appropriate to reduce radiation dose to as low as reasonably achievable. Discharge Medications:       Medina Briones   [place] Medication Instructions DNO:[id number]    Printed on:12/16/20 1521   Medication Information                      alogliptin (NESINA) 25 MG TABS tablet  Take 25 mg by mouth daily             aspirin 81 MG tablet  Take 81 mg by mouth daily             ciprofloxacin (CIPRO) 500 MG tablet  Take 1 tablet by mouth 2 times daily for 5 days             dexamethasone (DECADRON) 1.5 MG tablet  Take 2 tablets by mouth daily (with breakfast) for 3 days             gabapentin (NEURONTIN) 300 MG capsule  Take 300 mg by mouth 2 times daily.              insulin glargine (LANTUS) 100 UNIT/ML injection vial  Inject 16 Units into the skin 2 times daily             insulin lispro (HUMALOG) 100 UNIT/ML injection vial  Inject 11 Units into the skin 3 times daily (with meals)             magnesium oxide (MAG-OX) 400 (240 Mg) MG tablet  Take 1 tablet by mouth 2 times daily             memantine (NAMENDA) 5 MG tablet  Take 5 mg by mouth 2 times daily             metFORMIN (GLUCOPHAGE) 1000 MG tablet  Take 1 tablet by mouth 2 times daily (with meals)             rivaroxaban (XARELTO) 20 MG TABS tablet  Take 1 tablet by mouth daily             SIMVASTATIN PO  Take by mouth             tamsulosin (FLOMAX) 0.4 MG capsule  Take 1 capsule by mouth daily             Antibiotics on discharge to be determined by infectious disease. Disposition:   Discharged to [facility]. Any [facility] needs that were indicated and/or required as been addressed and set up by Social Work. Condition at discharge: Pt was medically stable at the time of discharge. Activity: activity as tolerated    Total time taken for discharging this patient: 40 minutes. Greater than 70% of time was spent focused exclusively on this patient. Time was taken to review chart, discuss plans with consultants, reconciling medications, discussing plan answering questions with patient.      SignedLesly Walker Leyva  12/16/2020, 3:21 PM

## 2020-12-16 NOTE — FLOWSHEET NOTE
Report called to [facility]. 1520 pt blood sugar 278 covered for dinner and additional coverage. Polo cath emptied with yellow clear urine for 1000cc    1536 spoke to [facility] respiratory therapy bipap settings at 12/6/ 35 % bleed in. And 3L during the day     [facility] gave update in regard to pt one touch,  insulin coverage, vitals and still no bowel movement.

## 2020-12-16 NOTE — PROGRESS NOTES
Chief Complaint   Patient presents with    Shortness of Breath     covid +     Patient is a 65 y.o. male who presents with a chief complaint of sob. . Patient is followed on a regular basis by JERRICA Berry - CNP. Patient is currently seen in the ICU. Tested positive for COVID-19 pneumonia. He is on BiPAP at this time. Patient with history of paroxysmal atrial fibrillation on oral anticoagulation. He was noted to have heart rates in the 30s in the emergency department placed on dopamine drip. Currently he developed atrial fibrillation with rapid ventricle response with heart rate into the 60s in ICU. He is currently off of Levophed and dopamine drips. His potassium is 3.7, magnesium is 1.9. Patient with past medical history of diabetes, hyperlipidemia, hypertension, CVA and paroxysmal atrial fibrillation. Patient initially developed atrial fibrillation during hospitalization for CVA in November 2020. Echocardiogram performed at that time showed ejection-60%, mild LVH, grade 1 diastolic dysfunction. 12-15-20: no further pauses, sinus ac on tele in 50-60's. Remains in isolation   Awaiting transfer to nursing home. No new events overnight. Discussed with internal medicine yesterday. 12-16-20: SB at 57bpm, no malignant arrhythmias. Remains in Covid isolation. Awaiting transfer to rehab. Discussed with hospitalist.  Will eventually need permanent pacemaker placement once cleared from Covid 19 isolation. On Xarelto 20 mg daily. Hemoglobin is stable at 14.       Past Medical History        Past Medical History:   Diagnosis Date    Adrenal adenoma, right 11/2020    Bipolar 1 disorder ([facility]     Cancer of skin of left ear     cured    Hearing loss     History of CVA (cerebrovascular accident)     History of tracheostomy     Hyperlipidemia     Hypertension     Left renal mass 11/2020    MCI (mild cognitive impairment) 2019    Paroxysmal A-fib (HCC)     Uncontrolled diabetes mellitus [facility])          Patient Active Problem List   Diagnosis    Bipolar 1 disorder ([facility]    Diabetes ([facility]    Pneumonia    Dementia ([facility]    Renal mass, left    Urinary retention    Spinal stenosis of lumbar region with neurogenic claudication    Weakness    Paroxysmal atrial fibrillation (HCC)    Acquired cavus deformity of foot    Adhesive capsulitis of shoulder    Diabetic peripheral neuropathy (HCC)    Mild cognitive disorder    Pneumonia due to COVID-19 virus     Past Surgical History         Past Surgical History:   Procedure Laterality Date    AV FISTULA REPAIR      COLONOSCOPY  10/21/15    w/polypectomy     HERNIA REPAIR      TONSILLECTOMY      TRACHEOTOMY       Social History   Social History         Socioeconomic History    Marital status:      Spouse name: None    Number of children: None    Years of education: None    Highest education level: None   Occupational History    None   Social Needs    Financial resource strain: None    Food insecurity     Worry: None     Inability: None    Transportation needs     Medical: None     Non-medical: None   Tobacco Use    Smoking status: Former Smoker    Smokeless tobacco: Never Used   Substance and Sexual Activity    Alcohol use: No     Alcohol/week: 0.0 standard drinks    Drug use: No    Sexual activity: None   Lifestyle    Physical activity     Days per week: None     Minutes per session: None    Stress: None   Relationships    Social connections     Talks on phone: None     Gets together: None     Attends Ep[employer] service: None     Active member of club or organization: None     Attends meetings of clubs or organizations: None     Relationship status: None    Intimate partner violence     Fear of current or ex partner: None     Emotionally abused: None     Physically abused: None     Forced sexual activity: None   Other Topics Concern    None   Social History Narrative    Born in [address] [facility] [facility] , had 2 siblings, they both passed        One daughter in [facility] 32 for the [employer] x 45 years     Family History         Family History   Problem Relation Age of Onset    Diabetes Mother     Heart Disease Mother     Stroke Father     Heart Disease Father     Cancer Brother     Stroke Sister      Current Facility-Administered Medications             Current Facility-Administered Medications   Medication Dose Route Frequency Provider Last Rate Last Dose    dexamethasone (PF) (DECADRON) injection 6 mg 6 mg Intravenous Q24H Edouard Triplett MD  6 mg at 12/11/20 0603    cefTRIAXone (ROCEPHIN) 1 g IVPB in 50 mL D5W minibag 1 g Intravenous Q24H Edouard Triplett MD  Stopped at 12/11/20 0049    norepinephrine (LEVOPHED) 16 mg in dextrose 5 % 250 mL infusion 2 mcg/min Intravenous Continuous Edouard Triplett MD  Stopped at 12/10/20 1719    0.9 % sodium chloride infusion  Intravenous Continuous Edouard Triplett MD  Stopped at 12/10/20 0647    insulin lispro (HUMALOG) injection vial 0-12 Units 0-12 Units Subcutaneous TID WC Edouard Triplett MD  8 Units at 12/10/20 1613    insulin lispro (HUMALOG) injection vial 0-6 Units 0-6 Units Subcutaneous Nightly Edouard Triplett MD  3 Units at 12/10/20 2117    memantine (NAMENDA) tablet 5 mg 5 mg Oral BID Edouard Triplett MD  5 mg at 12/10/20 2101    insulin glargine (LANTUS) injection vial 6 Units 6 Units Subcutaneous BID Edouard Triplett MD  6 Units at 12/10/20 2118    glucose (GLUTOSE) 40 % oral gel 15 g 15 g Oral PRN Edouard Triplett MD      dextrose 50 % IV solution 12.5 g Intravenous PRN Edouard Triplett MD      glucagon (rDNA) injection 1 mg 1 mg Intramuscular PRN Edouard Triplett MD      dextrose 5 % solution 100 mL/hr Intravenous PRN Edouard Triplett MD      sodium chloride flush 0.9 % injection 10 mL 10 mL Intravenous 2 times per day Edouard Triplett MD  10 mL at 12/10/20 2101    sodium chloride flush 0.9 % injection 10 mL 10 mL Intravenous PRN MD Marcella Still acetaminophen (TYLENOL) tablet 650 mg 650 mg Oral Q6H PRN Julia Jamison MD      Or    acetaminophen (TYLENOL) suppository 650 mg 650 mg Rectal Q6H PRN Julia Jamison MD      polyethylene glycol (GLYCOLAX) packet 17 g 17 g Oral Daily PRN Julia Jamison MD      promethazine (PHENERGAN) tablet 12.5 mg 12.5 mg Oral Q6H PRN Julia Jamison MD      Or    ondansetron [facility]) injection 4 mg 4 mg Intravenous Q6H PRN Julia Jamison MD      DOPamine (INTROPIN) 400 mg in dextrose 5 % 250 mL infusion 2.5 mcg/kg/min Intravenous Continuous Julia Jamison MD  Stopped at 12/11/20 0530    magnesium sulfate 2 g in 50 mL IVPB premix 2 g Intravenous Once Julia Jamison MD      enoxaparin (LOVENOX) injection 100 mg 1 mg/kg Subcutaneous BID Julia Jamison MD  100 mg at 12/10/20 2100    remdesivir 100 mg in sodium chloride 0.9 % 250 mL IVPB 100 mg Intravenous Q24H Claudette Cox MD     ALLERGIES: Pioglitazone   Review of Systems   Unable to perform ROS: Acuity of condition     VITALS:   Blood pressure (!) 85/54, pulse 171, temperature 98.8 °F (37.1 °C), temperature source Oral, resp. rate 29, height 6' (1.829 m), weight 220 lb (99.8 kg), SpO2 97 %. Body mass index is 29.84 kg/m². Physical Exam   As per H&P per internal medicine, limited exposure to COVID-19 pandemic.    LABS:   Recent Results         Recent Results (from the past 24 hour(s))   POCT Glucose    Collection Time: 12/10/20 4:12 PM   Result Value Ref Range    POC Glucose 309 (H) 60 - 115 mg/dl    Performed on ACCU-CHEK    POCT Glucose    Collection Time: 12/10/20 8:34 PM   Result Value Ref Range    POC Glucose 278 (H) 60 - 115 mg/dl    Performed on ACCU-CHEK    Comprehensive Metabolic Panel    Collection Time: 12/11/20 5:14 AM   Result Value Ref Range    Sodium 139 135 - 144 mEq/L    Potassium 3.7 3.4 - 4.9 mEq/L    Chloride 107 95 - 107 mEq/L    CO2 19 (L) 20 - 31 mEq/L    Anion Gap 13 9 - 15 mEq/L    Glucose 224 (H) 70 - 99 mg/dL    BUN 15 8 - 23 mg/dL    CREATININE 0.58 (L) 0.70 - 1.20 mg/dL    GFR Non-African American >60.0 >60    GFR  >60.0 >60    Calcium 8.1 (L) 8.5 - 9.9 mg/dL    Total Protein 6.3 6.3 - 8.0 g/dL    Alb 2.8 (L) 3.5 - 4.6 g/dL    Total Bilirubin 0.3 0.2 - 0.7 mg/dL    Alkaline Phosphatase 59 35 - 104 U/L    ALT 21 0 - 41 U/L    AST 24 0 - 40 U/L    Globulin 3.5 2.3 - 3.5 g/dL   CBC Auto Differential    Collection Time: 12/11/20 5:14 AM   Result Value Ref Range    WBC 5.6 4.8 - 10.8 K/uL    RBC 4.32 (L) 4.70 - 6.10 M/uL    Hemoglobin 13.4 (L) 14.0 - 18.0 g/dL    Hematocrit 39.0 (L) 42.0 - 52.0 %    MCV 90.3 80.0 - 100.0 fL    MCH 31.0 27.0 - 31.3 pg    MCHC 34.4 33.0 - 37.0 %    RDW 13.3 11.5 - 14.5 %    Platelets 275 164 - 797 K/uL    Neutrophils % 78.1 %    Lymphocytes % 11.2 %    Monocytes % 10.6 %    Eosinophils % 0.0 %    Basophils % 0.1 %    Neutrophils Absolute 4.4 1.4 - 6.5 K/uL    Lymphocytes Absolute 0.6 (L) 1.0 - 4.8 K/uL    Monocytes Absolute 0.6 0.2 - 0.8 K/uL    Eosinophils Absolute 0.0 0.0 - 0.7 K/uL    Basophils Absolute 0.0 0.0 - 0.2 K/uL   Magnesium    Collection Time: 12/11/20 5:14 AM   Result Value Ref Range    Magnesium 1.9 1.7 - 2.4 mg/dL   EKG 12 Lead    Collection Time: 12/11/20 5:44 AM   Result Value Ref Range    Ventricular Rate 178 BPM    Atrial Rate 166 BPM    QRS Duration 102 ms    Q-T Interval 244 ms    QTc Calculation (Bazett) 420 ms    R Axis 80 degrees    T Axis 236 degrees   Troponin:         Lab Results   Component Value Date    TROPONINI <0.010 11/24/2020     ASSESSMENT:   Paroxysmal atrial fibrillation currently A. fib with RVR   Sick sinus syndrome with episode of bradycardia into the 30s   COVID-19 pneumonia   Normal LV function   History of CVA   Essential hypertension   Hyperlipidemia   History of diabetes       PLAN:   1. As always, aggressive risk factor modification is strongly recommended.  We should adhere to the JNC VIII guidelines for HTN management and the NCEPATP III guidelines for LDL-C management. 2. Hold negative chronotropic for now. Only give as needed IV Lopressor. Would rather have patient's heart rate faster than slower. 3. Will eventually require permanent pacemaker placement when feasible. . If needed will insert transvenous pacemaker . We will arrange as outpatient. 4. Cont to monitor on tele. 5. Xarelto 20 mg once daily  6. Kerp potassium greater than 4, magnesium greater than 2  7. GI/DVT prophylaxis  8. Stable for discharge from cardiology standpoint.       Electronically signed by DO Camille on 12/16/2020 at 7:32 AM

## 2020-12-16 NOTE — PROGRESS NOTES
Hospitalist Daily Progress Note  Name: Anika Fang  Age: 65 y.o. Gender: male  CodeStatus: Full Code  Allergies: Pioglitazone    Chief Complaint:Shortness of Breath (covid +)    Primary Care Provider: JERRICA Lee - CNP  InpatientTreatment Team: Treatment Team: Attending Provider: Neeta Walker DO; Consulting Physician: Gricel Sandoval MD; Consulting Physician: Michael Sexton DO; : BELEN Gonzalez; Utilization Reviewer: Ai Emery RN; : Carlee Talley RN; Registered Nurse: Marge Arauz RN; Patient Care Tech: Rubi Bowers [facility] [place]  Admission Date: 12/9/2020      Subjective: Patient is seen evaluated bedside. We still are awaiting precertification for discharge. Case is discussed with cardiology who feels that we are stable to wait until Covid has been ruled out before placing pacemaker. Heart rates been stable in the low 50s this evening. Physical Exam  Constitutional:       Appearance: He is not ill-appearing or diaphoretic. Comments: Awake and alert but oriented only to person   HENT:      Head: Normocephalic and atraumatic. Nose: Nose normal.      Mouth/Throat:      Mouth: Mucous membranes are moist.      Pharynx: Oropharynx is clear. Eyes:      Conjunctiva/sclera: Conjunctivae normal.      Pupils: Pupils are equal, round, and reactive to light. Cardiovascular:      Rate and Rhythm: Bradycardia present. Heart sounds: No murmur. No friction rub. No gallop. Pulmonary:      Breath sounds: No wheezing, rhonchi or rales. Abdominal:      General: There is no distension. Tenderness: There is no abdominal tenderness. There is no guarding. Musculoskeletal: Normal range of motion. Right lower leg: Edema present. Left lower leg: Edema present.    Neurological:      Comments: Awake alert oriented only to person   Psychiatric:      Comments: Unable to assess due to confusion         Review of Systems  14 point ROS unreliable due to confusion  Medications:  Reviewed    Infusion Medications:    dextrose       Scheduled Medications:    rivaroxaban  20 mg Oral Daily    insulin glargine  15 Units Subcutaneous BID    insulin lispro  15 Units Subcutaneous Once    [START ON 12/16/2020] insulin lispro  10 Units Subcutaneous TID WC    magnesium oxide  400 mg Oral BID    insulin lispro  0-18 Units Subcutaneous TID WC    insulin lispro  0-9 Units Subcutaneous Nightly    piperacillin-tazobactam  3.375 g Intravenous Q8H    dexamethasone  6 mg Intravenous Q24H    memantine  5 mg Oral BID    sodium chloride flush  10 mL Intravenous 2 times per day    magnesium sulfate  2 g Intravenous Once     PRN Meds: metoprolol, glucose, dextrose, glucagon (rDNA), dextrose, sodium chloride flush, acetaminophen **OR** acetaminophen, polyethylene glycol, promethazine **OR** ondansetron    Labs:   Recent Labs     12/13/20  0617 12/14/20  0614 12/15/20  0549   WBC 11.8* 9.2 7.9   HGB 13.1* 13.8* 13.5*   HCT 38.5* 39.2* 39.5*    315 334     Recent Labs     12/13/20  0617 12/14/20  0614 12/15/20  0549   * 131* 131*   K 4.1 4.0 4.2    100 99   CO2 18* 18* 19*   BUN 14 14 18   CREATININE 0.56* 0.63* 0.62*   CALCIUM 8.7 8.7 8.6     Recent Labs     12/13/20  0617 12/14/20  0614 12/15/20  0549   AST 31 40 21   ALT 29 50* 39   BILITOT 0.3 0.6 0.4   ALKPHOS 56 64 68     No results for input(s): INR in the last 72 hours. No results for input(s): Donportillo Keens in the last 72 hours. Urinalysis:   Lab Results   Component Value Date    NITRU POSITIVE 12/09/2020    WBCUA 21-50 12/09/2020    BACTERIA FEW 12/09/2020    RBCUA  12/09/2020    BLOODU LARGE 12/09/2020    SPECGRAV 1.025 12/09/2020    GLUCOSEU Negative 12/09/2020       Radiology:   Most recent    Chest CT      WITH CONTRAST:No results found for this or any previous visit. WITHOUT CONTRAST: No results found for this or any previous visit.     CXR      2-view:   Results for orders placed during the hospital encounter of 11/21/20   XR CHEST (2 VW)    Narrative XR CHEST (2 VW) : 11/21/2020    CLINICAL HISTORY:  mental status change . COMPARISON: Portable chest 8/10/2012 and two-view chest 7/19/2005. TECHNIQUE: Upright AP and lateral radiographs of the chest were obtained. FINDINGS:    A poor inspiratory volume is present with mild probable bibasilar atelectasis. Bronchopneumonia should be excluded clinically. There is no cardiomegaly, significant pleural effusion, vascular congestion, pneumothorax, or displaced fractures identified. Impression POOR INSPIRATION WITH MILD PROBABLE BIBASILAR ATELECTASIS. BRONCHOPNEUMONIA SHOULD BE EXCLUDED CLINICALLY. Portable:   Results for orders placed during the hospital encounter of 12/09/20   XR CHEST PORTABLE    Narrative EXAMINATION: XR CHEST PORTABLE    CLINICAL HISTORY: HYPOXIA    COMPARISONS: CT CHEST, DECEMBER 10, 2020    FINDINGS: Osseous structures are intact. Cardiopericardial silhouette is normal. Pulmonary vasculature is normal. Lungs are clear. Impression NO ACUTE CARDIOPULMONARY DISEASE. Echo No results found for this or any previous visit. Assessment/Plan:    Active Hospital Problems    Diagnosis Date Noted    Pneumonia due to COVID-19 virus [U07.1, J12.89] 12/10/2020     Acute hypoxic respiratory failure secondary to COVID-19. Status post treatment with remdesivir and Decadron. Pulmonary following. Outpatient follow-up with CT in 6 weeks for left lower lobe nodule. Continue CPAP for SHENG    Bradycardia. Hold any rate control agents or lazaro blocking agents. Follow-up with cardiology in 2 to 3 weeks for pacemaker placement following COVID-19 infection. Heart rate 52 currently. Daily electrolyte monitoring supplement as needed mag of 2 potassium 4    UTI, catheter associated present on admission, infectious disease following.:  Shock resolved. Continue Zosyn.   Pseudomonas aeruginosa culture. DM2 with hyperglycemia: 15 units Humalog now then Lantus 15 twice daily with 10 units Humalog 3 times daily and sliding scale coverage adjust as needed goal glucose less than 180    Dementia.:  Supportive care Namenda    Hypertension.:  Avoid lazaro blocking agents    Hyperlipidemia with a history of CVA Xarelto    Additional work up or/and treatment plan may be added today or then after based on clinical progression. I am managing a portion of pt care. Some medical issues are handled byother specialists. Additional work up and treatment should be done in out pt setting by pt PCP and other out pt providers. In addition to examining and evaluating pt, I spent additional time explaining care, normaland abnormal findings, and treatment plan. All of pt questions were answered. Counseling, diet and education were provided. Case will be discussed with nursing staff when appropriate. Family will be updated if and whenappropriate.       Electronically signed by Montana Powell DO on 12/15/2020 at 9:36 PM

## 2020-12-16 NOTE — CARE COORDINATION
The LSW updated Papito Riedel, admissions at [address] [place] [address], the patient is a possible discharge for today. Electronically signed by BELEN Moran on 12/16/20 at 12:39 PM EST    The LSW set the patient for discharge by cot (on O2 and has history of dementia) at 5 pm to return to [address] [place] [address]. The LSW updated the patient's daughter, Ben Yi, of the discharge time for the patient. Ben Yi states she will notify Deisy Thurston. No HCPOA papers were faxed to the hospital. Ben Yi, patient's daugher, discussed the competency paperwork and she stated she did not fax the information. The LSW updated Ben Yi that she would notify [address] [place] [address] regarding her concerns and for follow up with ROSY's  if needed. Ben Yi states she has been working with the [employer].  Electronically signed by BELEN Moran on 12/16/20 at 3:11 PM EST

## 2020-12-18 ENCOUNTER — TELEPHONE (OUTPATIENT)
Dept: CARDIOLOGY CLINIC | Age: 70
End: 2020-12-18

## 2020-12-18 LAB
ALBUMIN SERPL-MCNC: 2.7 G/DL (ref 3.5–4.6)
ALP BLD-CCNC: 72 U/L (ref 35–104)
ALT SERPL-CCNC: 24 U/L (ref 0–41)
ANION GAP SERPL CALCULATED.3IONS-SCNC: 14 MEQ/L (ref 9–15)
AST SERPL-CCNC: 15 U/L (ref 0–40)
BASOPHILS ABSOLUTE: 0 K/UL (ref 0–0.2)
BASOPHILS RELATIVE PERCENT: 0 %
BILIRUB SERPL-MCNC: 0.7 MG/DL (ref 0.2–0.7)
BUN BLDV-MCNC: 21 MG/DL (ref 8–23)
C-REACTIVE PROTEIN: 43 MG/L (ref 0–5)
CALCIUM SERPL-MCNC: 8.6 MG/DL (ref 8.5–9.9)
CHLORIDE BLD-SCNC: 98 MEQ/L (ref 95–107)
CO2: 18 MEQ/L (ref 20–31)
CREAT SERPL-MCNC: 0.67 MG/DL (ref 0.7–1.2)
D DIMER: 0.95 MG/L FEU (ref 0–0.5)
EOSINOPHILS ABSOLUTE: 0 K/UL (ref 0–0.7)
EOSINOPHILS RELATIVE PERCENT: 0.1 %
FERRITIN: 748.5 NG/ML (ref 30–400)
GFR AFRICAN AMERICAN: >60
GFR NON-AFRICAN AMERICAN: >60
GLOBULIN: 3.5 G/DL (ref 2.3–3.5)
GLUCOSE BLD-MCNC: 122 MG/DL (ref 70–99)
HCT VFR BLD CALC: 40.4 % (ref 42–52)
HEMOGLOBIN: 13.7 G/DL (ref 14–18)
LACTATE DEHYDROGENASE: 183 U/L (ref 135–225)
LYMPHOCYTES ABSOLUTE: 0.8 K/UL (ref 1–4.8)
LYMPHOCYTES RELATIVE PERCENT: 8.2 %
MCH RBC QN AUTO: 30.2 PG (ref 27–31.3)
MCHC RBC AUTO-ENTMCNC: 34 % (ref 33–37)
MCV RBC AUTO: 88.8 FL (ref 80–100)
MONOCYTES ABSOLUTE: 0.8 K/UL (ref 0.2–0.8)
MONOCYTES RELATIVE PERCENT: 8 %
NEUTROPHILS ABSOLUTE: 8.3 K/UL (ref 1.4–6.5)
NEUTROPHILS RELATIVE PERCENT: 83.7 %
PDW BLD-RTO: 13.7 % (ref 11.5–14.5)
PLATELET # BLD: 310 K/UL (ref 130–400)
POTASSIUM SERPL-SCNC: 3.7 MEQ/L (ref 3.4–4.9)
PROCALCITONIN: 0.04 NG/ML (ref 0–0.15)
RBC # BLD: 4.55 M/UL (ref 4.7–6.1)
SODIUM BLD-SCNC: 130 MEQ/L (ref 135–144)
TOTAL CK: 60 U/L (ref 0–190)
TOTAL PROTEIN: 6.2 G/DL (ref 6.3–8)
TROPONIN: <0.01 NG/ML (ref 0–0.01)
WBC # BLD: 10 K/UL (ref 4.8–10.8)

## 2020-12-18 NOTE — TELEPHONE ENCOUNTER
----- Message from Brittni Juarez DO sent at 12/14/2020  2:22 PM EST -----  Regarding: follow up  Needs to be seen in 2 weeks, virtual if possible, in hospital now with covid, likely going home. Need eventual pacemaker.      Electronically signed by Brittni Juarez DO on 12/14/2020 at 2:22 PM

## 2020-12-19 RX ORDER — FAMOTIDINE 20 MG/1
20 TABLET, FILM COATED ORAL 2 TIMES DAILY
COMMUNITY
End: 2021-01-05

## 2020-12-19 RX ORDER — CHOLECALCIFEROL (VITAMIN D3) 125 MCG
5 CAPSULE ORAL 2 TIMES DAILY
COMMUNITY
End: 2021-01-05

## 2020-12-19 RX ORDER — MULTIVIT-MIN/IRON/FOLIC ACID/K 18-600-40
2000 CAPSULE ORAL DAILY
COMMUNITY

## 2020-12-19 RX ORDER — ZINC GLUCONATE 50 MG
50 TABLET ORAL DAILY
COMMUNITY

## 2020-12-22 LAB
ALBUMIN SERPL-MCNC: 2.8 G/DL (ref 3.5–4.6)
ALP BLD-CCNC: 65 U/L (ref 35–104)
ALT SERPL-CCNC: 16 U/L (ref 0–41)
ANION GAP SERPL CALCULATED.3IONS-SCNC: 13 MEQ/L (ref 9–15)
AST SERPL-CCNC: 12 U/L (ref 0–40)
BASOPHILS ABSOLUTE: 0 K/UL (ref 0–0.2)
BASOPHILS RELATIVE PERCENT: 0.4 %
BILIRUB SERPL-MCNC: 0.5 MG/DL (ref 0.2–0.7)
BUN BLDV-MCNC: 16 MG/DL (ref 8–23)
C-REACTIVE PROTEIN: 11.9 MG/L (ref 0–5)
CALCIUM SERPL-MCNC: 9.1 MG/DL (ref 8.5–9.9)
CHLORIDE BLD-SCNC: 98 MEQ/L (ref 95–107)
CO2: 20 MEQ/L (ref 20–31)
CREAT SERPL-MCNC: 0.52 MG/DL (ref 0.7–1.2)
D DIMER: 0.65 MG/L FEU (ref 0–0.5)
EOSINOPHILS ABSOLUTE: 0 K/UL (ref 0–0.7)
EOSINOPHILS RELATIVE PERCENT: 0.1 %
GFR AFRICAN AMERICAN: >60
GFR NON-AFRICAN AMERICAN: >60
GLOBULIN: 3 G/DL (ref 2.3–3.5)
GLUCOSE BLD-MCNC: 80 MG/DL (ref 70–99)
HCT VFR BLD CALC: 39.6 % (ref 42–52)
HEMOGLOBIN: 13.2 G/DL (ref 14–18)
INR BLD: 1.2
LYMPHOCYTES ABSOLUTE: 1.2 K/UL (ref 1–4.8)
LYMPHOCYTES RELATIVE PERCENT: 12.1 %
MCH RBC QN AUTO: 30.4 PG (ref 27–31.3)
MCHC RBC AUTO-ENTMCNC: 33.3 % (ref 33–37)
MCV RBC AUTO: 91.1 FL (ref 80–100)
MONOCYTES ABSOLUTE: 0.7 K/UL (ref 0.2–0.8)
MONOCYTES RELATIVE PERCENT: 6.6 %
NEUTROPHILS ABSOLUTE: 8.3 K/UL (ref 1.4–6.5)
NEUTROPHILS RELATIVE PERCENT: 80.8 %
PDW BLD-RTO: 14 % (ref 11.5–14.5)
PLATELET # BLD: 345 K/UL (ref 130–400)
POTASSIUM SERPL-SCNC: 3.6 MEQ/L (ref 3.4–4.9)
PROCALCITONIN: 0.03 NG/ML (ref 0–0.15)
PROTHROMBIN TIME: 15.2 SEC (ref 12.3–14.9)
RBC # BLD: 4.35 M/UL (ref 4.7–6.1)
SODIUM BLD-SCNC: 131 MEQ/L (ref 135–144)
TOTAL PROTEIN: 5.8 G/DL (ref 6.3–8)
WBC # BLD: 10.2 K/UL (ref 4.8–10.8)

## 2020-12-23 ENCOUNTER — VIRTUAL VISIT (OUTPATIENT)
Dept: GERIATRIC MEDICINE | Age: 70
End: 2020-12-23
Payer: MEDICARE

## 2020-12-23 VITALS
SYSTOLIC BLOOD PRESSURE: 117 MMHG | WEIGHT: 220 LBS | HEART RATE: 56 BPM | DIASTOLIC BLOOD PRESSURE: 72 MMHG | BODY MASS INDEX: 29.84 KG/M2 | RESPIRATION RATE: 20 BRPM | TEMPERATURE: 97.3 F | OXYGEN SATURATION: 98 %

## 2020-12-23 PROCEDURE — 99309 SBSQ NF CARE MODERATE MDM 30: CPT | Performed by: NURSE PRACTITIONER

## 2020-12-23 NOTE — PROGRESS NOTES
2020    Bettie Dewitt [facility] / [facility] [facility]      TELEHEALTH EVALUATION -- Audio/Visual (During VDILG-07 public health emergency)    HPI:    Lamine Hood (:  1950) has requested an audio/video evaluation for the following concern(s):      visit for CC of COVID-19. Pt/Nurse noted  they had c/o  Hypoxia , poor po intake. Is worsened by covid pna, relieved by O2 and abx. Review of Systems  ROS: Nurse/pt reports   Had decrease in appetite, and low grade fever, has resolved is now weaker. He ate and drank fair today. Tolerating IV fluids and abx without adverse effects  Had no runny nose, sore throat. Has moist cough. denies c/o loss of taste or smell,  Respiratory: Respiratory therapist listened to lungs,pt denies SOB, dyspnea  Cardiovascular: Nurse listened to heart sounds, no observed PND, orthopnea  GI: No V/D/C. : no reports of dysuria  Extremities: No reports of pain issues, no edema     Prior to Visit Medications    Medication Sig Taking?  Authorizing Provider   Cholecalciferol (VITAMIN D) 50 MCG ( UT) CAPS capsule Take 2,000 Units by mouth daily  Historical Provider, MD   melatonin 5 MG TABS tablet Take 5 mg by mouth 2 times daily  Historical Provider, MD   famotidine (PEPCID) 20 MG tablet Take 20 mg by mouth 2 times daily  Historical Provider, MD   Ascorbic Acid (VITAMIN C PO) Take 500 mg by mouth 2 times daily  Historical Provider, MD   zinc gluconate 50 MG tablet Take 50 mg by mouth daily  Historical Provider, MD   insulin glargine (LANTUS) 100 UNIT/ML injection vial Inject 16 Units into the skin 2 times daily  Grover Leyva,    insulin lispro (HUMALOG) 100 UNIT/ML injection vial Inject 11 Units into the skin 3 times daily (with meals)  Huy Leyva,    magnesium oxide (MAG-OX) 400 (240 Mg) MG tablet Take 1 tablet by mouth 2 times daily  Margarette Read DO   metFORMIN (GLUCOPHAGE) 1000 MG tablet Take 1 tablet by mouth 2 times daily (with meals) Minerva Burnham MD   rivaroxaban (XARELTO) 20 MG TABS tablet Take 1 tablet by mouth daily  Brian Coupe, DO   tamsulosin (FLOMAX) 0.4 MG capsule Take 1 capsule by mouth daily  Brian Coupe, DO   gabapentin (NEURONTIN) 300 MG capsule Take 300 mg by mouth 2 times daily.   Historical Provider, MD   memantine (NAMENDA) 5 MG tablet Take 5 mg by mouth 2 times daily  Historical Provider, MD   alogliptin (NESINA) 25 MG TABS tablet Take 25 mg by mouth daily  Historical Provider, MD   SIMVASTATIN PO Take 10 mg by mouth daily   Historical Provider, MD   aspirin 81 MG tablet Take 81 mg by mouth daily  Historical Provider, MD       Social History     Tobacco Use    Smoking status: Former Smoker    Smokeless tobacco: Never Used   Substance Use Topics    Alcohol use: No     Alcohol/week: 0.0 standard drinks    Drug use: No        Allergies   Allergen Reactions    Pioglitazone    ,   Past Medical History:   Diagnosis Date    Adrenal adenoma, right 11/2020    Bipolar 1 disorder ([facility]     Cancer of skin of left ear     cured    Hearing loss     History of CVA (cerebrovascular accident)     History of tracheostomy     Hyperlipidemia     Hypertension     Left renal mass 11/2020    MCI (mild cognitive impairment) 2019    Paroxysmal A-fib ([address]     Uncontrolled diabetes mellitus ([facility]    ,   Past Surgical History:   Procedure Laterality Date    AV FISTULA REPAIR      COLONOSCOPY  10/21/15    w/polypectomy     HERNIA REPAIR      TONSILLECTOMY      TRACHEOTOMY     ,   Social History     Tobacco Use    Smoking status: Former Smoker    Smokeless tobacco: Never Used   Substance Use Topics    Alcohol use: No     Alcohol/week: 0.0 standard drinks    Drug use: No   ,   Family History   Problem Relation Age of Onset    Diabetes Mother     Heart Disease Mother     Stroke Father     Heart Disease Father     Cancer Brother     Stroke Sister        PHYSICAL EXAMINATION:  [ INSTRUCTIONS:  \"[x]\" Indicates a positive item  \"[]\" Indicates a negative item  -- DELETE ALL ITEMS NOT EXAMINED]  Vital Signs: (As obtained by patient/caregiver or practitioner observation)    /72   Pulse 56   Temp 97.3 °F (36.3 °C)   Resp 20   Wt 220 lb (99.8 kg)   SpO2 98%   BMI 29.84 kg/m²       Constitutional: [x] Appears well-developed and well-nourished [x] No apparent distress        Mental status  [x] Alert and awake  [x] Oriented to person [x]Able to follow commands    mild confusion, is conversational    Speech is clear, and appropriate     Eyes:  EOM    [x]  Normal  [] Abnormal-  Sclera  [x]  Normal  [] Abnormal -         Discharge [x]  None visible  [] Abnormal -    HENT:   [x] Normocephalic, atraumatic. [] Abnormal   [x] Mouth/Throat: Mucous membranes are moist.     External Ears [x] Normal  [] Abnormal-     Neck: [x] No visualized mass     Pulmonary/Chest: nurse reports : Heart sounds are  [place] rate  Lungs  sounds are scattered rhonchi posterior bases   [x] Respiratory effort normal.  [x] No visualized signs of difficulty breathing or respiratory distress  No cyanosis         Musculoskeletal:         [x] Normal range of motion of neck        [x] Abnormal-   LIMITED ROM in bed     Neurological:        [x] No Facial Asymmetry (Cranial nerve 7 motor function) (limited exam to video visit)          [x] No gaze palsy                Skin:        [x] No significant exanthematous lesions or discoloration noted on facial skin                   Psychiatric:       [x] Normal Affect [x] No Hallucinations       Other pertinent observable physical exam findings-     ASSESSMENT/PLAN:   Diagnosis Orders   1. 2019 novel coronavirus-infected pneumonia (NCIP)     2. Hypotension due to hypovolemia     3. Dementia without behavioral disturbance, unspecified dementia type ([facility]     1.  CXR was negative  Ct chest + trace b/l effusion AND bibasilar infiltrate; left is possibly nodular and will need f/u in future   Tolerating IV abx, finish course. 2. IV hydration, no further hypotension. Most likely d/c in a few days and continue to check BP and BMP is due weekly   3. Observe for progression, and covid encephalopathy -stable , same meds    Standing orders for COVID -19 labs on diagnosis AND q week: if needed will start  CBC  BMP  LFT  LDH  FERRITIN  PROCALCITONIN  CRP  D-DIMER  TROPONIN    CXR ON DIAGNOSIS    medications:  Vit D3 1000 units po bid  VIt C 500mg po bid  Zinc 50mg po daily  Pepcid 20mg po daily IF NOT on PPI    Continue same meds and POC    Return if symptoms worsen or fail to improve. Alexsander Briones is a 65 y.o. male being evaluated by a Virtual Visit (video visit) encounter to address concerns as mentioned above. A caregiver was present when appropriate. Due to this being a TeleHealth encounter (During [facility]- public health emergency), evaluation of the following organ systems was limited: Vitals/Constitutional/EENT/Resp/CV/GI//MS/Neuro/Skin/Heme-Lymph-Imm. Pursuant to the emergency declaration under the [address] [place] [address], [address] [place] [address] authority and the [employer] Act, this Virtual Visit was conducted with patient's (and/or legal guardian's) consent, to reduce the patient's risk of exposure to COVID-19 and provide necessary medical care. The patient (and/or legal guardian) has also been advised to contact this office for worsening conditions or problems, and seek emergency medical treatment and/or call 911 if deemed necessary. Patient identification was verified at the start of the visit: Yes    Total time spent on this encounter: Not billed by time    Services were provided through a video synchronous discussion virtually to substitute for in-person clinic visit, and they agree to continue with assistance of [facility] staff and via VIRTUAL platform Patient and provider were located at their individual homes.      --JERRICA Dodson - CNP on 12/23/2020 at 7:18 PM    An electronic signature was used to authenticate this note.

## 2020-12-24 ENCOUNTER — OFFICE VISIT (OUTPATIENT)
Dept: GERIATRIC MEDICINE | Age: 70
End: 2020-12-24
Payer: MEDICARE

## 2020-12-24 DIAGNOSIS — U07.1 COVID-19: Primary | ICD-10-CM

## 2020-12-24 PROCEDURE — 99309 SBSQ NF CARE MODERATE MDM 30: CPT | Performed by: INTERNAL MEDICINE

## 2020-12-29 LAB
ALBUMIN SERPL-MCNC: 2.9 G/DL (ref 3.5–4.6)
ALP BLD-CCNC: 77 U/L (ref 35–104)
ALT SERPL-CCNC: 13 U/L (ref 0–41)
ANION GAP SERPL CALCULATED.3IONS-SCNC: 18 MEQ/L (ref 9–15)
AST SERPL-CCNC: 12 U/L (ref 0–40)
BASOPHILS ABSOLUTE: 0 K/UL (ref 0–0.2)
BASOPHILS RELATIVE PERCENT: 0.3 %
BILIRUB SERPL-MCNC: 0.6 MG/DL (ref 0.2–0.7)
BUN BLDV-MCNC: 13 MG/DL (ref 8–23)
C-REACTIVE PROTEIN: 26.7 MG/L (ref 0–5)
CALCIUM SERPL-MCNC: 9.5 MG/DL (ref 8.5–9.9)
CHLORIDE BLD-SCNC: 103 MEQ/L (ref 95–107)
CO2: 20 MEQ/L (ref 20–31)
CREAT SERPL-MCNC: 0.61 MG/DL (ref 0.7–1.2)
D DIMER: 1.8 MG/L FEU (ref 0–0.5)
EOSINOPHILS ABSOLUTE: 0.1 K/UL (ref 0–0.7)
EOSINOPHILS RELATIVE PERCENT: 2.5 %
GFR AFRICAN AMERICAN: >60
GFR NON-AFRICAN AMERICAN: >60
GLOBULIN: 3.4 G/DL (ref 2.3–3.5)
GLUCOSE BLD-MCNC: 113 MG/DL (ref 70–99)
HCT VFR BLD CALC: 37.8 % (ref 42–52)
HEMOGLOBIN: 12.5 G/DL (ref 14–18)
LYMPHOCYTES ABSOLUTE: 0.9 K/UL (ref 1–4.8)
LYMPHOCYTES RELATIVE PERCENT: 16.7 %
MCH RBC QN AUTO: 30.2 PG (ref 27–31.3)
MCHC RBC AUTO-ENTMCNC: 33.1 % (ref 33–37)
MCV RBC AUTO: 91.2 FL (ref 80–100)
MONOCYTES ABSOLUTE: 0.4 K/UL (ref 0.2–0.8)
MONOCYTES RELATIVE PERCENT: 7.4 %
NEUTROPHILS ABSOLUTE: 4.1 K/UL (ref 1.4–6.5)
NEUTROPHILS RELATIVE PERCENT: 73.1 %
PDW BLD-RTO: 14.5 % (ref 11.5–14.5)
PLATELET # BLD: 255 K/UL (ref 130–400)
POTASSIUM SERPL-SCNC: 3.8 MEQ/L (ref 3.4–4.9)
PROCALCITONIN: 0.04 NG/ML (ref 0–0.15)
RBC # BLD: 4.14 M/UL (ref 4.7–6.1)
SODIUM BLD-SCNC: 141 MEQ/L (ref 135–144)
TOTAL PROTEIN: 6.3 G/DL (ref 6.3–8)
WBC # BLD: 5.6 K/UL (ref 4.8–10.8)

## 2020-12-29 NOTE — PROGRESS NOTES
Past Medical History:   Diagnosis Date    Adrenal adenoma, right 11/2020    Bipolar 1 disorder ([address]     Cancer of skin of left ear     cured    Hearing loss     History of CVA (cerebrovascular accident)     History of tracheostomy     Hyperlipidemia     Hypertension     Left renal mass 11/2020    MCI (mild cognitive impairment) 2019    Paroxysmal A-fib ([address]     Uncontrolled diabetes mellitus ([address]

## 2020-12-30 ENCOUNTER — OFFICE VISIT (OUTPATIENT)
Dept: GERIATRIC MEDICINE | Age: 70
End: 2020-12-30
Payer: MEDICARE

## 2020-12-30 VITALS
BODY MASS INDEX: 29.8 KG/M2 | RESPIRATION RATE: 16 BRPM | DIASTOLIC BLOOD PRESSURE: 74 MMHG | SYSTOLIC BLOOD PRESSURE: 118 MMHG | HEIGHT: 72 IN | TEMPERATURE: 97.2 F | WEIGHT: 220 LBS | HEART RATE: 76 BPM

## 2020-12-30 PROCEDURE — 99304 1ST NF CARE SF/LOW MDM 25: CPT | Performed by: INTERNAL MEDICINE

## 2020-12-30 ASSESSMENT — ENCOUNTER SYMPTOMS: TROUBLE SWALLOWING: 0

## 2020-12-30 ASSESSMENT — PATIENT HEALTH QUESTIONNAIRE - PHQ9: DEPRESSION UNABLE TO ASSESS: FUNCTIONAL CAPACITY MOTIVATION LIMITS ACCURACY

## 2020-12-30 NOTE — PROGRESS NOTES
Hari Estrada is a 65 y.o. male with type 2 diabetes mellitus, hypertension, history of CVA, cognitive impairment, whom I am seeing at [facility] of [address] [place] [address] for evaluation for the admission of 12/16/2020, after [facility] admission from December 9 till December 16, with treatment for pneumonia due to COVID-19 virus, Pseudomonas UTI. The patient was seen with his/her consent in his/her room at the facility. I also spoke with the nurse and reviewed the hospital and nursing home records. Chief Complaint   Patient presents with    Pneumonia    Altered Mental Status    Positive For Covid-19 on 12/09/2020    Urinary Tract Infection       Brief hospitalization summary:    The patient was taken from the nursing home to the emergency room for fever, tachypnea, in the context of COVID-19 positivity. He was also diagnosed with UTI and treatment started with Rocephin. While in the emergency room, he was hypotensive, acidotic, oxygen saturation dropped to 82%. The patient was admitted to the hospital for  \"acute hypoxic respiratory failure secondary to COVID-19 pneumonia. Patient was started on Decadron along with remdesivir with slow steady improvement. Patient will continue on CPAP for SHENG. Patient had noted bradycardia during hospitalization all lazaro blocking agents were stopped and patient will follow up with cardiology in 3 to 4 weeks for possible pacemaker placement. CT of the chest also showed a possible kidney mass which is noted on predischarge review of imaging and will be recommended to follow-up with urology in 2 to 3 weeks for further recommendations. Patient has a noted UTI which is culture positive for Pseudomonas aeruginosa is currently on Zosyn. Further antibiotics on discharge be determined by infectious disease. Patient will discharge on Lantus 16 units twice nightly and Humalog 7 units 3 times daily.   Patient will continue with Xarelto on discharge for history of CVA\".    Interim history: Since readmission to the nursing facility the patient's vital signs remained stable, no labored breathing or oxygen desaturation while on room air. The patient remained confused at baseline. No significant concerns from the nursing staff today. Laboratory and imaging studies reports reviewed included (but were not limited to) the following:    Orders Only on 12/29/2020   Component Date Value Ref Range Status    Sodium 12/29/2020 141  135 - 144 mEq/L Final    Potassium 12/29/2020 3.8  3.4 - 4.9 mEq/L Final    Chloride 12/29/2020 103  95 - 107 mEq/L Final    CO2 12/29/2020 20  20 - 31 mEq/L Final    Anion Gap 12/29/2020 18* 9 - 15 mEq/L Final    Glucose 12/29/2020 113* 70 - 99 mg/dL Final    BUN 12/29/2020 13  8 - 23 mg/dL Final    CREATININE 12/29/2020 0.61* 0.70 - 1.20 mg/dL Final    GFR Non- 12/29/2020 >60.0  >60 Final    Comment: >60 mL/min/1.73m2 EGFR, calc. for ages 25 and older using the  MDRD formula (not corrected for weight), is valid for stable  renal function.  GFR  12/29/2020 >60.0  >60 Final    Comment: >60 mL/min/1.73m2 EGFR, calc. for ages 25 and older using the  MDRD formula (not corrected for weight), is valid for stable  renal function.       Calcium 12/29/2020 9.5  8.5 - 9.9 mg/dL Final    Total Protein 12/29/2020 6.3  6.3 - 8.0 g/dL Final    Alb 12/29/2020 2.9* 3.5 - 4.6 g/dL Final    Total Bilirubin 12/29/2020 0.6  0.2 - 0.7 mg/dL Final    Alkaline Phosphatase 12/29/2020 77  35 - 104 U/L Final    ALT 12/29/2020 13  0 - 41 U/L Final    AST 12/29/2020 12  0 - 40 U/L Final    Globulin 12/29/2020 3.4  2.3 - 3.5 g/dL Final   Orders Only on 12/29/2020   Component Date Value Ref Range Status    Procalcitonin 12/29/2020 0.04  0.00 - 0.15 ng/mL Final    Comment: Suspected Sepsis:  Low likelihood of sepsis  <.50 ng/mL    Increased likelihood of sepsis 0.50-2.00 ng/mL  Antibiotics encouraged    High risk of sepsis/shock >2.00 ng/mL  Antibiotics strongly encouraged    Suspected Lower Respiratory Tract Infections:  Low likelihood of bacterial infection  <0.24 ng/mL    Increased likelihood of bacterial infection >0.24 ng/mL  Antibiotics encouraged    With successful antibiotic therapy, PCT levels should decrease  rapidly. (Half-life of 24 to 36 hours.)    Procalcitonin values from samples collected within the first  6 hours of systemic infection may still be low. Retesting may be indicated. Values from day 1 and day 4 can be entered into the Change in  Procalcitonin Calculator to determine the patient's  Mortality Risk Prognosis  ([email or web address])    In healthy neonates, plasma Procalcitonin (PCT) concentrations  increase gradually after birth, reaching peak values at about  24 hours of age then decrease to normal values below 0.5                            ng/mL  by 48-72 hours of age.      Orders Only on 12/29/2020   Component Date Value Ref Range Status    CRP 12/29/2020 26.7* 0.0 - 5.0 mg/L Final   Orders Only on 12/29/2020   Component Date Value Ref Range Status    D-Dimer, Quant 12/29/2020 1.80* 0.00 - 0.50 mg/L FEU Final    VTE (DVT or PE) cut-off = 0.50 mg/L FEU   Orders Only on 12/29/2020   Component Date Value Ref Range Status    WBC 12/29/2020 5.6  4.8 - 10.8 K/uL Final    RBC 12/29/2020 4.14* 4.70 - 6.10 M/uL Final    Hemoglobin 12/29/2020 12.5* 14.0 - 18.0 g/dL Final    Hematocrit 12/29/2020 37.8* 42.0 - 52.0 % Final    MCV 12/29/2020 91.2  80.0 - 100.0 fL Final    MCH 12/29/2020 30.2  27.0 - 31.3 pg Final    MCHC 12/29/2020 33.1  33.0 - 37.0 % Final    RDW 12/29/2020 14.5  11.5 - 14.5 % Final    Platelets 12/33/1228 255  130 - 400 K/uL Final    Neutrophils % 12/29/2020 73.1  % Final    Lymphocytes % 12/29/2020 16.7  % Final    Monocytes % 12/29/2020 7.4  % Final    Eosinophils % 12/29/2020 2.5  % Final    Basophils % 12/29/2020 0.3  % Final    Neutrophils Absolute 12/29/2020 4.1  1.4 - 6.5 K/uL Final    Lymphocytes Absolute 12/29/2020 0.9* 1.0 - 4.8 K/uL Final    Monocytes Absolute 12/29/2020 0.4  0.2 - 0.8 K/uL Final    Eosinophils Absolute 12/29/2020 0.1  0.0 - 0.7 K/uL Final    Basophils Absolute 12/29/2020 0.0  0.0 - 0.2 K/uL Final   Orders Only on 12/22/2020   Component Date Value Ref Range Status    D-Dimer, Quant 12/22/2020 0.65* 0.00 - 0.50 mg/L FEU Final    VTE (DVT or PE) cut-off = 0.50 mg/L FEU   Orders Only on 12/22/2020   Component Date Value Ref Range Status    Protime 12/22/2020 15.2* 12.3 - 14.9 sec Final    INR 12/22/2020 1.2   Final   Orders Only on 12/22/2020   Component Date Value Ref Range Status    CRP 12/22/2020 11.9* 0.0 - 5.0 mg/L Final   Orders Only on 12/22/2020   Component Date Value Ref Range Status    Procalcitonin 12/22/2020 0.03  0.00 - 0.15 ng/mL Final    Comment: Suspected Sepsis:  Low likelihood of sepsis  <.50 ng/mL    Increased likelihood of sepsis 0.50-2.00 ng/mL  Antibiotics encouraged    High risk of sepsis/shock   >2.00 ng/mL  Antibiotics strongly encouraged    Suspected Lower Respiratory Tract Infections:  Low likelihood of bacterial infection  <0.24 ng/mL    Increased likelihood of bacterial infection >0.24 ng/mL  Antibiotics encouraged    With successful antibiotic therapy, PCT levels should decrease  rapidly. (Half-life of 24 to 36 hours.)    Procalcitonin values from samples collected within the first  6 hours of systemic infection may still be low. Retesting may be indicated. Values from day 1 and day 4 can be entered into the Change in  Procalcitonin Calculator to determine the patient's  Mortality Risk Prognosis  ([email or web address]-[email or web address] com)    In healthy neonates, plasma Procalcitonin (PCT) concentrations  increase gradually after birth, reaching peak values at about  24 hours of age then decrease to normal values below 0.5                            ng/mL  by 48-72 hours of age.      Orders Only on 12/22/2020   Component Date Value Ref Range Status    Sodium 12/22/2020 131* 135 - 144 mEq/L Final    Potassium 12/22/2020 3.6  3.4 - 4.9 mEq/L Final    Chloride 12/22/2020 98  95 - 107 mEq/L Final    CO2 12/22/2020 20  20 - 31 mEq/L Final    Anion Gap 12/22/2020 13  9 - 15 mEq/L Final    Glucose 12/22/2020 80  70 - 99 mg/dL Final    BUN 12/22/2020 16  8 - 23 mg/dL Final    CREATININE 12/22/2020 0.52* 0.70 - 1.20 mg/dL Final    GFR Non- 12/22/2020 >60.0  >60 Final    Comment: >60 mL/min/1.73m2 EGFR, calc. for ages 25 and older using the  MDRD formula (not corrected for weight), is valid for stable  renal function.  GFR  12/22/2020 >60.0  >60 Final    Comment: >60 mL/min/1.73m2 EGFR, calc. for ages 25 and older using the  MDRD formula (not corrected for weight), is valid for stable  renal function.  Calcium 12/22/2020 9.1  8.5 - 9.9 mg/dL Final    Total Protein 12/22/2020 5.8* 6.3 - 8.0 g/dL Final    Alb 12/22/2020 2.8* 3.5 - 4.6 g/dL Final    Total Bilirubin 12/22/2020 0.5  0.2 - 0.7 mg/dL Final    Alkaline Phosphatase 12/22/2020 65  35 - 104 U/L Final    ALT 12/22/2020 16  0 - 41 U/L Final    AST 12/22/2020 12  0 - 40 U/L Final    Globulin 12/22/2020 3.0  2.3 - 3.5 g/dL Final   There may be more visits with results that are not included. HPI:    More detail above in the chiefcomplaint(s), interim history and below in the review of systems. Pneumonia  This is a new problem. The current episode started 1 to 4 weeks ago. The problem has been gradually improving. Pertinent negatives include no appetite change, dyspnea on exertion, fever or trouble swallowing. He reports significant improvement on treatment. There is no history of asthma, bronchiectasis or COPD. Altered Mental Status  The current episode started 1 to 4 weeks ago. The problem has been gradually improving. Associated symptoms include weakness. Pertinent negatives include no congestion, fever or joint swelling.  The symptoms are aggravated by exertion. The treatment provided moderate relief. Urinary Tract Infection   This is a recurrent problem. The current episode started 1 to 4 weeks ago. The problem has been resolved. There has been no fever. Pertinent negatives include no hematuria. He has tried antibiotics and increased fluids for the symptoms. His past medical history is significant for catheterization.          Past Medical History:   Diagnosis Date    Adrenal adenoma, right 11/2020    Bipolar 1 disorder ([address]     Hearing loss     History of CVA (cerebrovascular accident)     History of skin cancer     left ear, excised    History of tracheostomy     Hyperlipidemia     Hypertension     Left renal mass 11/2020    MCI (mild cognitive impairment) 2019    SHENG on CPAP 12/2020    Paroxysmal A-fib ([address]     Uncontrolled diabetes mellitus ([address]     Urinary retention 12/2020       Past Surgical History:   Procedure Laterality Date    AV FISTULA REPAIR      COLONOSCOPY  10/21/15    w/polypectomy     HERNIA REPAIR      TONSILLECTOMY      TRACHEOTOMY         Social History     Socioeconomic History    Marital status:      Spouse name: Not on file    Number of children: Not on file    Years of education: Not on file    Highest education level: Not on file   Occupational History    Not on file   Social Needs    Financial resource strain: Not on file    Food insecurity     Worry: Not on file     Inability: Not on file    Transportation needs     Medical: Not on file     Non-medical: Not on file   Tobacco Use    Smoking status: Former Smoker    Smokeless tobacco: Never Used   Substance and Sexual Activity    Alcohol use: No     Alcohol/week: 0.0 standard drinks    Drug use: No    Sexual activity: Not on file   Lifestyle    Physical activity     Days per week: Not on file     Minutes per session: Not on file    Stress: Not on file   Relationships    Social connections     Talks on phone: Not on file     Gets together: Not on file     Attends Christian service: Not on file     Active member of club or organization: Not on file     Attends meetings of clubs or organizations: Not on file     Relationship status: Not on file    Intimate partner violence     Fear of current or ex partner: Not on file     Emotionally abused: Not on file     Physically abused: Not on file     Forced sexual activity: Not on file   Other Topics Concern    Not on file   Social History Narrative    [place], had 2 siblings, they both passed        One daughter in [facility] 32 for the [employer] x 45 years       Family History   Problem Relation Age of Onset    Diabetes Mother     Heart Disease Mother     Stroke Father     Heart Disease Father     Cancer Brother     Stroke Sister        Family and socialhistory were reviewed and pertinent changes documented.     ALLERGIES    Pioglitazone    Current Outpatient Medications on File Prior to Visit   Medication Sig Dispense Refill    Cholecalciferol (VITAMIN D) 50 MCG (2000 UT) CAPS capsule Take 2,000 Units by mouth daily      melatonin 5 MG TABS tablet Take 5 mg by mouth 2 times daily      famotidine (PEPCID) 20 MG tablet Take 20 mg by mouth 2 times daily      Ascorbic Acid (VITAMIN C PO) Take 500 mg by mouth 2 times daily      zinc gluconate 50 MG tablet Take 50 mg by mouth daily      insulin glargine (LANTUS) 100 UNIT/ML injection vial Inject 16 Units into the skin 2 times daily 1 vial 3    insulin lispro (HUMALOG) 100 UNIT/ML injection vial Inject 11 Units into the skin 3 times daily (with meals) 1 vial 3    magnesium oxide (MAG-OX) 400 (240 Mg) MG tablet Take 1 tablet by mouth 2 times daily 30 tablet     metFORMIN (GLUCOPHAGE) 1000 MG tablet Take 1 tablet by mouth 2 times daily (with meals) 60 tablet 0    rivaroxaban (XARELTO) 20 MG TABS tablet Take 1 tablet by mouth daily 30 tablet 0    tamsulosin (FLOMAX) 0.4 MG capsule Take 1 capsule by mouth daily 30 capsule 3    gabapentin (NEURONTIN) 300 MG capsule Take 300 mg by mouth 2 times daily.  memantine (NAMENDA) 5 MG tablet Take 5 mg by mouth 2 times daily      alogliptin (NESINA) 25 MG TABS tablet Take 25 mg by mouth daily      SIMVASTATIN PO Take 10 mg by mouth daily       aspirin 81 MG tablet Take 81 mg by mouth daily       No current facility-administered medications on file prior to visit. Review of Systems   Unable to perform ROS: Dementia   Constitutional: Negative for appetite change and fever. HENT: Negative for congestion and trouble swallowing. Cardiovascular: Negative for dyspnea on exertion. Genitourinary: Negative for hematuria. Musculoskeletal: Negative for joint swelling. Neurological: Positive for weakness. Vitals:    12/30/20 1621   BP: 118/74   Pulse: 76   Resp: 16   Temp: 97.2 °F (36.2 °C)   Weight: 220 lb (99.8 kg)   Height: 6' (1.829 m)       Physical Exam  Constitutional:       General: He is not in acute distress. Appearance: Normal appearance. He is not ill-appearing. Comments: Age-appropriate, pleasant, smiling, laying supine in his bed in no distress  Due to concerns for coronavirus infection spread, during today's face to face encounter, the physical exam was limited. HENT:      Head: Normocephalic. Nose: No rhinorrhea. Mouth/Throat:      Mouth: Mucous membranes are dry. Eyes:      General: No scleral icterus. Neck:      Musculoskeletal: No neck rigidity. Cardiovascular:      Rate and Rhythm: Normal rate and regular rhythm. Pulses:           Radial pulses are 2+ on the right side and 2+ on the left side. Heart sounds: Heart sounds are distant. No gallop. Pulmonary:      Effort: Pulmonary effort is normal. No respiratory distress. Abdominal:      General: There is no distension. Palpations: Abdomen is soft. Musculoskeletal:         General: No swelling.       Right lower leg: No edema. Left lower leg: No edema. Skin:     General: Skin is warm. Coloration: Skin is not jaundiced or pale. Neurological:      Mental Status: He is alert. Mental status is at baseline. He is disoriented. Coordination: Coordination normal.   Psychiatric:         Mood and Affect: Mood is not anxious or depressed. Speech: Speech is delayed. Behavior: Behavior is slowed. Behavior is not withdrawn or hyperactive. Thought Content: Thought content is not delusional.         Cognition and Memory: Cognition is impaired. Memory is impaired. Assessment:    Johan Garrison was seen today for pneumonia, altered mental status, positive for covid-19 and urinary tract infection. Diagnoses and all orders for this visit:    History of 2019 novel coronavirus disease (COVID-19)             Recovering well from acute episode with hypoxic respiratory failure. Repeat chest x-ray not necessary at this time for patient will follow up with the pulmonologist and have a chest CAT scan for the newly diagnosed lung nodule    Metabolic encephalopathy              Resolving, continue memantine for underlying neurocognitive impairment, continue care in the nursing facility      History of pneumonia             Resolving, stable respiratory status. Urinary retention               Chronic Polo catheter, follows with Dr. Babs Patricio of urology for it, and for the recently diagnosed left renal mass. History of UTI               Pseudomonas UTI treated in the hospital, resolved        Plan:    See all orders and comments in the assessment section. Reviewed with the patient/nurse today's diagnosis and associated problems, treatment plans, prognosis, questions answered. The current medical regimen is effective;  continue present plan and medications. Orders for repeat laboratories placed in the nursing home chart.          Close follow up needed in one week with CNP or with MD.       I

## 2021-01-04 VITALS
SYSTOLIC BLOOD PRESSURE: 132 MMHG | HEART RATE: 83 BPM | RESPIRATION RATE: 18 BRPM | OXYGEN SATURATION: 96 % | TEMPERATURE: 97.9 F | DIASTOLIC BLOOD PRESSURE: 60 MMHG

## 2021-01-04 PROBLEM — R26.2 DIFFICULTY IN WALKING: Status: ACTIVE | Noted: 2021-01-04

## 2021-01-04 PROBLEM — R29.898 WEAKNESS OF BOTH LOWER EXTREMITIES: Status: ACTIVE | Noted: 2021-01-04
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
## 2021-01-04 NOTE — PROGRESS NOTES
Name: Nnamdi Syed [place]: [facility]  [address]  [place] V[place]z  Date: 12/1/2020     Subjective:     Chief Complaint   Patient presents with    Follow-up     Evaluation of acute rehab progress and chronic conditions       HPI  Lorenzo Gilman is a 65 y.o. male being seen today for relation of acute rehab progress and management of chronic conditions. It was minute to [facility] for skilled rehab due to debility and deconditioning. He presented to the hospital with syncope and about bilateral lower extremity weakness. He has a history of diabetes, spinal stenosis, bipolar disorder, new onset atrial fib, hypertension, pneumonia, cognitive impairment, and urinary retention with Polo catheter in place. Resident has been participating in physical therapy and Occupational Therapy, is slowly progressing. Therapy staff are unsure if patient is safe to go home with his wife, she has confusion as well in the home environment is in disarray per his wife. His gait is unstable, loss of balance, poor safety awareness, and has expressive language deficits. Resident is in his room, in bed, well-developed, no acute distress. He is alert and oriented x1-2 although he has cognitive impairment uncertain whether resident understands his plan of care and therapy plan. He does have a Polo in it is draining clear yellow urine he is on antibiotic therapy for pneumonia. Blood sugars have been running high from 167-400s. Adjustments have been made to his insulin, he is on Lantus twice a day and Metformin twice a day. Vital signs are stable, no fever. Resident denies complaints of chest pain chest palpitations irregular heartbeat lightheadedness dizziness nausea vomiting diarrhea or constipation. Appetite is good, urine output is good.     Past Medical History:   Diagnosis Date    Adrenal adenoma, right 11/2020    [facility] disorder [facility]     Hearing loss     History of CVA (cerebrovascular accident)     History of skin cancer     left ear, excised    History of tracheostomy     Hyperlipidemia     Hypertension     Left renal mass 11/2020    MCI (mild cognitive impairment) 2019    SHENG on CPAP 12/2020    Paroxysmal A-fib (H[facility])     Uncontrolled diabetes mellitus ([address]     Urinary retention 12/2020       Allergies:  Reviewed in nursing facility records  Medications:  Reviewed and reconciled in nursing facility records    Review of Systems Pertinent positives as noted in HPI. Objective:   /60   Pulse 83   Temp 97.9 °F (36.6 °C)   Resp 18   SpO2 96%     Physical Exam resident is lying in bed, well-developed, no acute distress. PERRL, conjunctiva/lids normal, sclera Clear, EOMI. Oropharynx normal, no nasal discharge. Respirations are nonlabored chest excursion symmetrical lung sounds diminished in the lower lobes, crackles in the bases. No shortness of breath at rest exertional dyspnea. S1-S2 irregular, 2+ DP x2, no edema. Abdomen soft round nondistended positive bowel sounds x4 quadrants. Polo catheter in place no hematuria. Moving all extremities limited ROM abnormal gait. Speech is clear though cognition is poor, general sensation intact with light touch. Alert and oriented x2 cooperative with care no anxiety. Skin warm and dry color normal.     Diagnosis Orders   1. Weakness of both lower extremities     2. Difficulty in walking     3. Pneumonia due to infectious organism, unspecified laterality, unspecified part of lung     4. Mild cognitive disorder     5. PAF (paroxysmal atrial fibrillation) ([address]     6. Fall, sequela     7. Type 2 diabetes mellitus without complication, with long-term current use of insulin ([address]     8. Dementia without behavioral disturbance, unspecified dementia type ([address]           Assessment and Plan:      1.  Weakness of both lower extremities / Difficulty in walking / Fall, sequela  Bilateral lower extremity weakness, power

## 2021-01-05 ENCOUNTER — OFFICE VISIT (OUTPATIENT)
Dept: UROLOGY | Age: 71
End: 2021-01-05
Payer: MEDICARE

## 2021-01-05 VITALS
WEIGHT: 220 LBS | SYSTOLIC BLOOD PRESSURE: 90 MMHG | HEIGHT: 72 IN | DIASTOLIC BLOOD PRESSURE: 60 MMHG | HEART RATE: 84 BPM | BODY MASS INDEX: 29.8 KG/M2

## 2021-01-05 DIAGNOSIS — N28.89 RENAL MASS, LEFT: Primary | ICD-10-CM

## 2021-01-05 LAB
ALBUMIN SERPL-MCNC: 3 G/DL (ref 3.5–4.6)
ALP BLD-CCNC: 80 U/L (ref 35–104)
ALT SERPL-CCNC: 12 U/L (ref 0–41)
ANION GAP SERPL CALCULATED.3IONS-SCNC: 11 MEQ/L (ref 9–15)
AST SERPL-CCNC: 12 U/L (ref 0–40)
BASOPHILS ABSOLUTE: 0 K/UL (ref 0–0.2)
BASOPHILS RELATIVE PERCENT: 0.6 %
BILIRUB SERPL-MCNC: 0.3 MG/DL (ref 0.2–0.7)
BUN BLDV-MCNC: 13 MG/DL (ref 8–23)
C-REACTIVE PROTEIN: 19.7 MG/L (ref 0–5)
CALCIUM SERPL-MCNC: 9.3 MG/DL (ref 8.5–9.9)
CHLORIDE BLD-SCNC: 108 MEQ/L (ref 95–107)
CO2: 24 MEQ/L (ref 20–31)
CREAT SERPL-MCNC: 0.56 MG/DL (ref 0.7–1.2)
D DIMER: 1.85 MG/L FEU (ref 0–0.5)
EOSINOPHILS ABSOLUTE: 0.3 K/UL (ref 0–0.7)
EOSINOPHILS RELATIVE PERCENT: 6.3 %
GFR AFRICAN AMERICAN: >60
GFR NON-AFRICAN AMERICAN: >60
GLOBULIN: 3.3 G/DL (ref 2.3–3.5)
GLUCOSE BLD-MCNC: 79 MG/DL (ref 70–99)
HCT VFR BLD CALC: 36.5 % (ref 42–52)
HEMOGLOBIN: 12.2 G/DL (ref 14–18)
LYMPHOCYTES ABSOLUTE: 0.9 K/UL (ref 1–4.8)
LYMPHOCYTES RELATIVE PERCENT: 18.1 %
MCH RBC QN AUTO: 30.5 PG (ref 27–31.3)
MCHC RBC AUTO-ENTMCNC: 33.4 % (ref 33–37)
MCV RBC AUTO: 91.3 FL (ref 80–100)
MONOCYTES ABSOLUTE: 0.4 K/UL (ref 0.2–0.8)
MONOCYTES RELATIVE PERCENT: 8.8 %
NEUTROPHILS ABSOLUTE: 3.2 K/UL (ref 1.4–6.5)
NEUTROPHILS RELATIVE PERCENT: 66.2 %
PDW BLD-RTO: 15.6 % (ref 11.5–14.5)
PLATELET # BLD: 278 K/UL (ref 130–400)
POTASSIUM SERPL-SCNC: 4.1 MEQ/L (ref 3.4–4.9)
RBC # BLD: 3.99 M/UL (ref 4.7–6.1)
SODIUM BLD-SCNC: 143 MEQ/L (ref 135–144)
TOTAL PROTEIN: 6.3 G/DL (ref 6.3–8)
WBC # BLD: 4.8 K/UL (ref 4.8–10.8)

## 2021-01-05 PROCEDURE — 99214 OFFICE O/P EST MOD 30 MIN: CPT | Performed by: UROLOGY

## 2021-01-05 RX ORDER — ENOXAPARIN SODIUM 300 MG/3ML
30 INJECTION INTRAVENOUS; SUBCUTANEOUS 2 TIMES DAILY
COMMUNITY

## 2021-01-05 ASSESSMENT — ENCOUNTER SYMPTOMS: ABDOMINAL PAIN: 0

## 2021-01-05 NOTE — PROGRESS NOTES
Subjective:      Patient ID: Pop Arceo is a 65 y.o. male. HPI  This is a 66 yo male with recent pneumonia, Dementia, Bipolar Dz, CVA, PAF on Xarelto, DM, SHENG, COPD, Spinal stenosis in NH at [facility] and back after seen in consult at [facility] in 11/20 for a 6 cm suspicious Lt renal mass noted on recent CT Urogram. He is back to discuss future management options. He is still in urinary retention and has Polo catheter. He has no abd or flank pain. He has decreased mobility due to stenosis and is in wheelchair. He denies hematuria or F/C. He was not aware of his wife's full name today. I called his daughter Marlon Mohr) over the phone to discuss the recent CT findings of Lt renal mass and she lives in [place] and is trying to get him there for his care. She reports that he has pulled out the Polo catheter a few times and was replaced.      Past Medical History:   Diagnosis Date    Adrenal adenoma, right 11/2020    Bipolar 1 disorder ([address]     Hearing loss     History of CVA (cerebrovascular accident)     History of skin cancer     left ear, excised    History of tracheostomy     Hyperlipidemia     Hypertension     Left renal mass 11/2020    MCI (mild cognitive impairment) 2019    SHENG on CPAP 12/2020    Paroxysmal A-fib (HCC)     Uncontrolled diabetes mellitus ([address]     Urinary retention 12/2020     Past Surgical History:   Procedure Laterality Date    AV FISTULA REPAIR      COLONOSCOPY  10/21/15    w/polypectomy     HERNIA REPAIR      TONSILLECTOMY      TRACHEOTOMY       Social History     Socioeconomic History    Marital status:      Spouse name: None    Number of children: None    Years of education: None    Highest education level: None   Occupational History    None   Social Needs    Financial resource strain: None    Food insecurity     Worry: None     Inability: None    Transportation needs     Medical: None     Non-medical: None   Tobacco Use    Smoking status: Former Smoker    Smokeless tobacco: Never Used   Substance and Sexual Activity    Alcohol use: No     Alcohol/week: 0.0 standard drinks    Drug use: No    Sexual activity: None   Lifestyle    Physical activity     Days per week: None     Minutes per session: None    Stress: None   Relationships    Social connections     Talks on phone: None     Gets together: None     Attends Sabianism service: None     Active member of club or organization: None     Attends meetings of clubs or organizations: None     Relationship status: None    Intimate partner violence     Fear of current or ex partner: None     Emotionally abused: None     Physically abused: None     Forced sexual activity: None   Other Topics Concern    None   Social History Narrative    Born in [place], had 2 siblings, they both passed        One daughter in [facility] 32 for the [employer] x 45 years     Family History   Problem Relation Age of Onset    Diabetes Mother     Heart Disease Mother     Stroke Father     Heart Disease Father     Cancer Brother     Stroke Sister      Current Outpatient Medications   Medication Sig Dispense Refill    albuterol-ipratropium (COMBIVENT RESPIMAT)  MCG/ACT AERS inhaler Inhale 1 puff into the lungs 4 times daily      enoxaparin (LOVENOX) 300 MG/3ML injection Inject 30 mg into the skin 2 times daily      Cholecalciferol (VITAMIN D) 50 MCG (2000 UT) CAPS capsule Take 2,000 Units by mouth daily      Ascorbic Acid (VITAMIN C PO) Take 500 mg by mouth 2 times daily      zinc gluconate 50 MG tablet Take 50 mg by mouth daily      insulin glargine (LANTUS) 100 UNIT/ML injection vial Inject 16 Units into the skin 2 times daily 1 vial 3    insulin lispro (HUMALOG) 100 UNIT/ML injection vial Inject 11 Units into the skin 3 times daily (with meals) 1 vial 3    magnesium oxide (MAG-OX) 400 (240 Mg) MG tablet Take 1 tablet by mouth 2 times daily 30 tablet     metFORMIN EGFR, calc. for ages 25 and older using the   MDRD formula (not corrected for weight), is valid for stable   renal function. GFR  >60.0  >60 Final 01/05/2021  6:20 AM  - [facility]   >60 mL/min/1.73m2 EGFR, calc. for ages 25 and older using the   MDRD formula (not corrected for weight), is valid for stable   renal function.     Calcium 9.3  8.5 - 9.9 mg/dL Final 01/05/2021  6:20 AM [facility]   Total Protein 6.3  6.3 - 8.0 g/dL Final 01/05/2021  6:20 AM [facility]   Alb 3.0Low   3.5 - 4.6 g/dL Final 01/05/2021  6:20 AM [facility]   Total Bilirubin 0.3  0.2 - 0.7 mg/dL Final 01/05/2021  6:20 AM [facility]   Alkaline Phosphatase 80  35 - 104 U/L Final 01/05/2021  6:20 AM [facility]   ALT 12  0 - 41 U/L Final 01/05/2021  6:20 AM [facility]   AST 12  0 - 40 U/L Final 01/05/2021  6:20 AM [facility]   Globulin 3.3  2.3 - 3.5 g/dL Final 01/05/2021  6:20 AM [facility]   Testing Performed By    1/5/2021  7:22 AM - Joy [facility] Incoming Lab Results From Soft    Component Value Ref Range & Units Status Collected Lab   WBC 4.8  4.8 - 10.8 K/uL Final 01/05/2021  6:20 AM [facility]   RBC 3.99Low   4.70 - 6.10 M/uL Final 01/05/2021  6:20 AM [facility]   Hemoglobin 12.2Low   14.0 - 18.0 g/dL Final 01/05/2021  6:20 AM [facility]   Hematocrit 36.5Low   42.0 - 52.0 % Final 01/05/2021  6:20 AM [facility]   MCV 91.3  80.0 - 100.0 fL Final 01/05/2021  6:20 AM [facility]   MCH 30.5  27.0 - 31.3 pg Final 01/05/2021  6:20 AM [facility]   MCHC 33.4  33.0 - 37.0 % Final 01/05/2021  6:20 AM [facility]   RDW 15.6High   11.5 - 14.5 % Final 01/05/2021  6:20 AM [facility]   Platelets 346  549 - 400 K/uL Final 01/05/2021  6:20 AM [address] [facility]   Neutrophils % 66.2  % Final 01/05/2021  6:20 AM [address] [facility]   Lymphocytes % 18.1  % Final 01/05/2021  6:20 AM [address] [facility]   Monocytes % 8.8  % Final 01/05/2021  6:20 AM  - [facility]   Eosinophils % 6.3  % Final 01/05/2021  6:20 AM  - [facility]   Basophils % 0.6  % Final 01/05/2021  6:20 AM  - [facility]   Neutrophils Absolute 3.2  1.4 - 6.5 K/uL Final 01/05/2021  6:20 AM  - [facility]   Lymphocytes Absolute 0.9Low   1.0 - 4.8 K/uL Final 01/05/2021  6:20 AM  - [facility]   Monocytes Absolute 0.4  0.2 - 0.8 K/uL Final 01/05/2021  6:20 AM  - [facility]   Eosinophils Absolute 0.3  0.0 - 0.7 K/uL Final 01/05/2021  6:20 AM  - [facility]   Basophils Absolute 0.0  0.0 - 0.2 K        CT Urogram  Status: Final result   Order Providers    Authorizing Billing   MD Lebron Posey DO          Signed by    Signed Date/Time Phone Pager   Kun Eckert  [phone]    Reading Providers    Read Date Phone Pager   Kun Eckert  [phone]    Routing History    Priority Sent On From To Message Type    11/30/2020  2:20 PM Benjie, Chpo Incoming Lab Results From [address] [place] [address], DO Results    11/28/2020  6:58 AM Benjie, Chpo Incoming Lab Results From Roaln Hooper MD CC'd Results    11/28/2020  6:58 AM Benjie, Chpo Incoming Lab Results From Rolan Conde MD CC'd Results    11/28/2020  6:58 AM Benjie, Chpo Incoming Lab Results From Indra Lieberman MD CC'd Results    11/28/2020  6:58 AM Benjie, Chpo Incoming Lab Results From Rolan Trujillo DO CC'd Results    11/24/2020 11:44 AM Benjie, Chpo Incoming Lab Results From Rolan Trujillo DO CC'd Results    11/23/2020 11:22 PM Benjie, Chpo Incoming Lab Results From Rolan Conde MD CC'd Results    11/23/2020  1:38 PM Benjie, Joy Incoming Lab Results From Indra Lieberman MD CC'd Results    11/22/2020 12:57 PM Benjie, Joy Incoming Lab Results From Rolan Hooper MD CC'd Results 11/22/2020  8:38 AM Benjie, Chpo Incoming Lab Results From [facility] Griffin Meade MD CC'd Results   Radiation Dose Estimates    No radiation information found for this patient   Narrative   EXAM:  CT UROGRAM       History: Renal mass       Technique: Multiple contiguous axial images were obtained of the abdomen and pelvis from an level of the lung bases through the ischial tuberosities without and with contrast. Multiplanar reformats were obtained. Delayed images were obtained.       Comparison: MRI of the lumbar spine performed earlier on the same date and MRI of the lumbar spine from August 12, 2012       Findings:        Small left pleural effusion. Minimal bibasilar atelectasis. Heart size is at the upper limits of normal. Small to moderate pericardial effusion.       The gallbladder is partially contracted but otherwise unremarkable. The liver, spleen, stomach, pancreas, and left adrenal gland are within normal limits. A 2 cm right adrenal lesion is not significant changed from MRI of the lumbar spine from August 12, 2012 and is most likely an adenoma.       Precontrast images demonstrate no urinary tract calculi. Postcontrast images demonstrate a heterogenous enhancing left renal lesion measuring approximately 6 cm in AP dimension by 6 cm in transverse dimension by 5 cm in craniocaudal dimension. This    lesion contains multiple small calcifications. No left renal vein thrombosis identified. 2.7 cm left renal cyst and 1.5 cm right renal cyst. The urinary bladder is decompressed around a Polo catheter and demonstrates circumferential wall thickening. Air    is present within urinary bladder, likely secondary to Polo catheter.       Abdominal aorta is nonaneurysmal  and demonstrates atherosclerotic calcification . No retroperitoneal or abdominal/pelvic lymphadenopathy.       No small bowel obstruction. No overt colonic mass or pericolonic inflammation. Appendix is within normal limits. No free fluid or free air.     No acute osseous abnormality. Degenerative changes of the spine. No osteolytic or osteoblastic lesion identified.           Impression       6 x 6 x 5 cm left renal mass is considered renal cell carcinoma until proven otherwise. No left renal vein thrombosis.       Circumferential wall thickening of the urinary bladder.  Correlation with urinalysis recommended to exclude cystitis.       Small to moderate pericardial effusion.       Small left pleural effusion.           All CT scans at this facility use dose modulation, iterative reconstruction, and/or weight based dosing when appropriate to reduce radiation dose to as low as reasonably achievable.         XR CHEST PORTABLE  Status: Final result   Order Providers    Velia Knock, MD Enolia Heimlich, MD          Signed by    Signed Date/Time Phone Pager   Alia Brito [phone]    Reading Providers    Read Date Phone Pager   Alia Brito    Routing History    Priority Sent On From To Message Type    12/18/2020  9:01 AM Benjie, Chpo Incoming Lab Results From [address] [place] [address], APRN - CNP CC'd Results    12/18/2020  9:01 AM Benjie, Chpo Incoming Lab Results From Rolan Yo MD Results    12/16/2020  1:18 AM Benjie, Chpo Incoming Lab Results From Marcio Hopkins MD CC'd Results    12/16/2020  1:18 AM Benjie, Chpo Incoming Lab Results From Yudith Cruz MD CC'd Results    12/16/2020  1:18 AM Benjie, Chpo Incoming Lab Results From Rolan Trujillo DO CC'd Results    12/11/2020  1:10 PM Benjie, Chpo Incoming Lab Results From Rolan Trujillo DO CC'd Results    12/10/2020  4:29 PM Benjie, Chpo Incoming Lab Results From Marcio Hopkins MD CC'd Results    12/10/2020  4:29 PM Benjie, Chpo Incoming Lab Results From Yudith Cruz MD CC'd Results   Radiation Dose Estimates    No radiation information found for this patient   Narrative   EXAMINATION: XR CHEST PORTABLE       CLINICAL HISTORY: HYPOXIA     COMPARISONS: CT CHEST, DECEMBER 10, 2020       FINDINGS: Osseous structures are intact. Cardiopericardial silhouette is normal. Pulmonary vasculature is normal. Lungs are clear.           Impression   NO ACUTE CARDIOPULMONARY DISEASE. Assessment: This is a 64 yo male with recent pneumonia, Dementia, Bipolar Dz, CVA, PAF on Xarelto, DM, SHENG, COPD, Spinal stenosis in NH at [facility] and with incidentally detected and asymptomatic Lt 6 cm enhancing renal mass suspicious for cancer. I reviewed these findings with the patient and his daughter over the phone and had an extensive conversation with her. She is trying to bring him to [place] for care. I discussed in detail that he may not be a candidate for partial or radical nephrectomy given his current medical condition but would need to see a Robotic surgeon for second opinion and she agrees. I also recommend that the mass be followed in future if no surgery is immediately contemplated. I also discussed that his current lack of mobility related to spinal stenosis will make it unlikely that he voids spontaneously and will continue with Polo catheter and monthly catheter change for now. All questions were answered for the daughter. Plan:      1. F/U 3 mo for CT Urogram  2.  Refer to Dr Dilia Crenshaw at [facility] for possible robotic nephrectomy (?partial)        Guy Alves MD

## 2021-01-06 LAB
ANION GAP SERPL CALCULATED.3IONS-SCNC: 11 MEQ/L (ref 9–15)
BASOPHILS ABSOLUTE: 0 K/UL (ref 0–0.2)
BASOPHILS RELATIVE PERCENT: 0.7 %
BUN BLDV-MCNC: 14 MG/DL (ref 8–23)
CALCIUM SERPL-MCNC: 9.3 MG/DL (ref 8.5–9.9)
CHLORIDE BLD-SCNC: 105 MEQ/L (ref 95–107)
CO2: 24 MEQ/L (ref 20–31)
CREAT SERPL-MCNC: 0.64 MG/DL (ref 0.7–1.2)
EOSINOPHILS ABSOLUTE: 0.3 K/UL (ref 0–0.7)
EOSINOPHILS RELATIVE PERCENT: 6 %
GFR AFRICAN AMERICAN: >60
GFR NON-AFRICAN AMERICAN: >60
GLUCOSE BLD-MCNC: 87 MG/DL (ref 70–99)
HCT VFR BLD CALC: 38.4 % (ref 42–52)
HEMOGLOBIN: 13 G/DL (ref 14–18)
LYMPHOCYTES ABSOLUTE: 1.1 K/UL (ref 1–4.8)
LYMPHOCYTES RELATIVE PERCENT: 21.8 %
MCH RBC QN AUTO: 31.1 PG (ref 27–31.3)
MCHC RBC AUTO-ENTMCNC: 33.8 % (ref 33–37)
MCV RBC AUTO: 91.9 FL (ref 80–100)
MONOCYTES ABSOLUTE: 0.5 K/UL (ref 0.2–0.8)
MONOCYTES RELATIVE PERCENT: 9.1 %
NEUTROPHILS ABSOLUTE: 3.3 K/UL (ref 1.4–6.5)
NEUTROPHILS RELATIVE PERCENT: 62.4 %
PDW BLD-RTO: 15.4 % (ref 11.5–14.5)
PLATELET # BLD: 317 K/UL (ref 130–400)
POTASSIUM SERPL-SCNC: 4.2 MEQ/L (ref 3.4–4.9)
RBC # BLD: 4.18 M/UL (ref 4.7–6.1)
SODIUM BLD-SCNC: 140 MEQ/L (ref 135–144)
WBC # BLD: 5.2 K/UL (ref 4.8–10.8)

## 2021-01-18 ENCOUNTER — TELEPHONE (OUTPATIENT)
Dept: CARDIOLOGY CLINIC | Age: 71
End: 2021-01-18

## 2021-01-18 NOTE — LETTER
[facility] [place]  [facility]  Phone: [phone]  Fax: [phone], DO        January 18, 2021    [address] [address]      Dear Main Tay:        Our office has attempted without success to contact you by phone. Upon receiving this letter would you please return our call to the office to schedule a virtual appointment. Please update any phone numbers or mailing information with our front office staff if needed. If you have any questions or concerns, please don't hesitate to call.     Sincerely,    The office of    Rosmery Pringle, DO

## 2021-01-18 NOTE — TELEPHONE ENCOUNTER
Unable to reach patient by phone. Home number nit working and cell is wrong number.  Letter sent to schedule a virtual appointment

## 2021-01-18 NOTE — TELEPHONE ENCOUNTER
----- Message from Dhaval Calle DO sent at 12/14/2020  2:22 PM EST -----  Regarding: follow up  Needs to be seen in 2 weeks, virtual if possible, in hospital now with covid, likely going home. Need eventual pacemaker.      Electronically signed by Dhaval Calle DO on 12/14/2020 at 2:22 PM
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
## 2023-05-08 NOTE — CARE COORDINATION
The LSW talked at length with the patient's wife by telephone. The patient's wife is agreeable to review a SNF list and discuss with the patient's daughter, Dk Hawthorne. The LSW placed the SNF list in the patient's room on the bed side table.  Electronically signed by BELEN Navarro on 11/25/20 at 4:15 PM EST 73